# Patient Record
Sex: FEMALE | Race: WHITE
[De-identification: names, ages, dates, MRNs, and addresses within clinical notes are randomized per-mention and may not be internally consistent; named-entity substitution may affect disease eponyms.]

---

## 2018-06-13 ENCOUNTER — HOSPITAL ENCOUNTER (EMERGENCY)
Dept: HOSPITAL 8 - ED | Age: 23
Discharge: LEFT BEFORE DISCHARGE | End: 2018-06-13
Payer: MEDICAID

## 2018-06-13 VITALS — WEIGHT: 238.1 LBS | HEIGHT: 63 IN | BODY MASS INDEX: 42.19 KG/M2

## 2018-06-13 VITALS — DIASTOLIC BLOOD PRESSURE: 79 MMHG | SYSTOLIC BLOOD PRESSURE: 120 MMHG

## 2018-06-13 DIAGNOSIS — Z3A.01: ICD-10-CM

## 2018-06-13 DIAGNOSIS — R10.30: ICD-10-CM

## 2018-06-13 DIAGNOSIS — R42: ICD-10-CM

## 2018-06-13 DIAGNOSIS — O26.891: Primary | ICD-10-CM

## 2018-06-13 LAB
ALBUMIN SERPL-MCNC: 3 G/DL (ref 3.4–5)
ALP SERPL-CCNC: 70 U/L (ref 45–117)
ALT SERPL-CCNC: 31 U/L (ref 12–78)
ANION GAP SERPL CALC-SCNC: 8 MMOL/L (ref 5–15)
BASOPHILS # BLD AUTO: 0.06 X10^3/UL (ref 0–0.1)
BASOPHILS NFR BLD AUTO: 1 % (ref 0–1)
BILIRUB SERPL-MCNC: 0.2 MG/DL (ref 0.2–1)
CALCIUM SERPL-MCNC: 8.7 MG/DL (ref 8.5–10.1)
CHLORIDE SERPL-SCNC: 105 MMOL/L (ref 98–107)
CREAT SERPL-MCNC: 0.79 MG/DL (ref 0.55–1.02)
EOSINOPHIL # BLD AUTO: 0.13 X10^3/UL (ref 0–0.4)
EOSINOPHIL NFR BLD AUTO: 1 % (ref 1–7)
ERYTHROCYTE [DISTWIDTH] IN BLOOD BY AUTOMATED COUNT: 14.5 % (ref 9.6–15.2)
LYMPHOCYTES # BLD AUTO: 2.05 X10^3/UL (ref 1–3.4)
LYMPHOCYTES NFR BLD AUTO: 22 % (ref 22–44)
MCH RBC QN AUTO: 28.3 PG (ref 27–34.8)
MCHC RBC AUTO-ENTMCNC: 33.8 G/DL (ref 32.4–35.8)
MCV RBC AUTO: 83.9 FL (ref 80–100)
MD: NO
MONOCYTES # BLD AUTO: 0.59 X10^3/UL (ref 0.2–0.8)
MONOCYTES NFR BLD AUTO: 6 % (ref 2–9)
NEUTROPHILS # BLD AUTO: 6.47 X10^3/UL (ref 1.8–6.8)
NEUTROPHILS NFR BLD AUTO: 70 % (ref 42–75)
PLATELET # BLD AUTO: 310 X10^3/UL (ref 130–400)
PMV BLD AUTO: 8.1 FL (ref 7.4–10.4)
PROT SERPL-MCNC: 7.6 G/DL (ref 6.4–8.2)
RBC # BLD AUTO: 4.61 X10^6/UL (ref 3.82–5.3)

## 2018-06-13 PROCEDURE — 99285 EMERGENCY DEPT VISIT HI MDM: CPT

## 2018-06-13 PROCEDURE — 80053 COMPREHEN METABOLIC PANEL: CPT

## 2018-06-13 PROCEDURE — 76801 OB US < 14 WKS SINGLE FETUS: CPT

## 2018-06-13 PROCEDURE — 84702 CHORIONIC GONADOTROPIN TEST: CPT

## 2018-06-13 PROCEDURE — 36415 COLL VENOUS BLD VENIPUNCTURE: CPT

## 2018-06-13 PROCEDURE — 85025 COMPLETE CBC W/AUTO DIFF WBC: CPT

## 2018-09-20 ENCOUNTER — GYNECOLOGY VISIT (OUTPATIENT)
Dept: OBGYN | Facility: MEDICAL CENTER | Age: 23
End: 2018-09-20
Payer: COMMERCIAL

## 2018-09-20 ENCOUNTER — HOSPITAL ENCOUNTER (OUTPATIENT)
Facility: MEDICAL CENTER | Age: 23
End: 2018-09-20
Attending: OBSTETRICS & GYNECOLOGY
Payer: COMMERCIAL

## 2018-09-20 VITALS
DIASTOLIC BLOOD PRESSURE: 80 MMHG | SYSTOLIC BLOOD PRESSURE: 121 MMHG | HEIGHT: 63 IN | BODY MASS INDEX: 44.83 KG/M2 | WEIGHT: 253 LBS

## 2018-09-20 DIAGNOSIS — Z11.51 SCREENING FOR HPV (HUMAN PAPILLOMAVIRUS): ICD-10-CM

## 2018-09-20 DIAGNOSIS — Z01.419 WELL WOMAN EXAM WITH ROUTINE GYNECOLOGICAL EXAM: ICD-10-CM

## 2018-09-20 DIAGNOSIS — Z12.4 SCREENING FOR CERVICAL CANCER: ICD-10-CM

## 2018-09-20 DIAGNOSIS — Z34.90 PREGNANCY, UNSPECIFIED GESTATIONAL AGE: ICD-10-CM

## 2018-09-20 DIAGNOSIS — Z11.3 SCREEN FOR SEXUALLY TRANSMITTED DISEASES: ICD-10-CM

## 2018-09-20 PROCEDURE — 59401 PR NEW OB VISIT: CPT | Performed by: OBSTETRICS & GYNECOLOGY

## 2018-09-20 PROCEDURE — 88175 CYTOPATH C/V AUTO FLUID REDO: CPT

## 2018-09-20 PROCEDURE — 87591 N.GONORRHOEAE DNA AMP PROB: CPT

## 2018-09-20 PROCEDURE — 87491 CHLMYD TRACH DNA AMP PROBE: CPT

## 2018-09-20 RX ORDER — FLUTICASONE PROPIONATE 50 MCG
1 SPRAY, SUSPENSION (ML) NASAL DAILY
COMMUNITY

## 2018-09-21 ENCOUNTER — HOSPITAL ENCOUNTER (OUTPATIENT)
Facility: MEDICAL CENTER | Age: 23
End: 2018-09-21
Attending: OBSTETRICS & GYNECOLOGY
Payer: COMMERCIAL

## 2018-09-21 DIAGNOSIS — Z01.419 WELL WOMAN EXAM WITH ROUTINE GYNECOLOGICAL EXAM: ICD-10-CM

## 2018-09-21 DIAGNOSIS — Z11.51 SCREENING FOR HPV (HUMAN PAPILLOMAVIRUS): ICD-10-CM

## 2018-09-21 DIAGNOSIS — Z12.4 SCREENING FOR CERVICAL CANCER: ICD-10-CM

## 2018-09-21 DIAGNOSIS — Z11.3 SCREEN FOR SEXUALLY TRANSMITTED DISEASES: ICD-10-CM

## 2018-09-21 NOTE — PROGRESS NOTES
"Cc: New OB visit    HPI:  The patient is a 23 y.o.  EDC 12/15/18 based upon unsure LMP (first week April)    She presents for her new obstetric visit.    She reports fetal movement, denies vaginal bleeding, denies leakage of fluid, denies contractions.     She denies nausea/vomiting, headaches, or urinary symptoms.      OB History    Para Term  AB Living   4       2     SAB TAB Ectopic Molar Multiple Live Births   1                # Outcome Date GA Lbr Dwayne/2nd Weight Sex Delivery Anes PTL Lv   4             3             2 AB            1 SAB                 Past Medical History:   Diagnosis Date   • Asthma      History reviewed. No pertinent surgical history.  Social History     Social History   • Marital status: Single     Spouse name: N/A   • Number of children: N/A   • Years of education: N/A     Occupational History   • Not on file.     Social History Main Topics   • Smoking status: Never Smoker   • Smokeless tobacco: Never Used   • Alcohol use No   • Drug use: No   • Sexual activity: Yes     Partners: Male     Birth control/ protection: Pill     Other Topics Concern   • Not on file     Social History Narrative   • No narrative on file     Family History   Problem Relation Age of Onset   • Obesity Mother    • Cancer Maternal Aunt    • Hypertension Maternal Aunt    • Anemia Maternal Aunt    • Cancer Maternal Grandmother    • Obesity Maternal Grandmother      Allergies:   Allergies as of 2018   • (No Known Allergies)       PE:    Blood pressure 121/80, height 1.6 m (5' 3\"), weight 114.8 kg (253 lb).    General: no apparent distress, is well developed and well nourished  Head: normocephalic, atraumatic  Neck: neck is supple, non-tender, no thyromegaly, full range of motion  CVS: regular rate and rhythm, no peripheral edema  Lungs: Normal respiratory effort. Clear to auscultation bilaterally  Abdomen: Bowel sounds positive, nondistended, soft, nontender x4, no rebound or " julia.  Female GYN: normal female external genitalia without lesionsnormal external genitalia, no erythema, no discharge  Skin: No rashes, or ulcers or lesions seen  Psychiatric: Patient shows appropriate affect, is alert and oriented x3, intact judgment and insight.        A/P:  23 y.o.  Unknown based upon  No LMP recorded (within months)..  She is here for her new obstetric visit.    1. Well woman exam with routine gynecological exam  THINPREP RFLX HPV ASCUS W/CTNG   2. Screening for cervical cancer  THINPREP RFLX HPV ASCUS W/CTNG   3. Screening for HPV (human papillomavirus)  THINPREP RFLX HPV ASCUS W/CTNG   4. Screen for sexually transmitted diseases  THINPREP RFLX HPV ASCUS W/CTNG   5. Pregnancy, unspecified gestational age  GLUCOSE 1HR GESTATIONAL    PRENATAL PANEL 3+HIV+UACXI    US-OB 2ND 3RD TRI COMPLETE       1. Ultrasound for dates and anatomy ordered.  2. Too late for trimester screening for Down Syndrome and neural tube defects  3.  ordered prenatal labs.  4.  NOB packet given with ACOG prenatal book.  5.  Increase water intake and encouraged healthy nutrition.  6.  Referral to MFM not needed at this time.  7.  Continue prenatal vitamins.  8.  Follow-up in 4 weeks.   9.  SAB precautions educated.

## 2018-09-25 LAB
C TRACH DNA GENITAL QL NAA+PROBE: NEGATIVE
CYTOLOGY REG CYTOL: NORMAL
N GONORRHOEA DNA GENITAL QL NAA+PROBE: NEGATIVE
SPECIMEN SOURCE: NORMAL

## 2018-10-08 ENCOUNTER — HOSPITAL ENCOUNTER (OUTPATIENT)
Dept: RADIOLOGY | Facility: MEDICAL CENTER | Age: 23
End: 2018-10-08
Attending: OBSTETRICS & GYNECOLOGY
Payer: COMMERCIAL

## 2018-10-08 DIAGNOSIS — Z34.90 PREGNANCY, UNSPECIFIED GESTATIONAL AGE: ICD-10-CM

## 2018-10-08 PROCEDURE — 76805 OB US >/= 14 WKS SNGL FETUS: CPT

## 2018-10-20 ENCOUNTER — HOSPITAL ENCOUNTER (OUTPATIENT)
Dept: LAB | Facility: MEDICAL CENTER | Age: 23
End: 2018-10-20
Attending: OBSTETRICS & GYNECOLOGY
Payer: COMMERCIAL

## 2018-10-20 DIAGNOSIS — Z34.90 PREGNANCY, UNSPECIFIED GESTATIONAL AGE: ICD-10-CM

## 2018-10-20 LAB
ABO GROUP BLD: NORMAL
APPEARANCE UR: ABNORMAL
BACTERIA #/AREA URNS HPF: ABNORMAL /HPF
BASOPHILS # BLD AUTO: 0.4 % (ref 0–1.8)
BASOPHILS # BLD: 0.03 K/UL (ref 0–0.12)
BILIRUB UR QL STRIP.AUTO: NEGATIVE
BLD GP AB SCN SERPL QL: NORMAL
COLOR UR: ABNORMAL
EOSINOPHIL # BLD AUTO: 0.08 K/UL (ref 0–0.51)
EOSINOPHIL NFR BLD: 1 % (ref 0–6.9)
EPI CELLS #/AREA URNS HPF: ABNORMAL /HPF
ERYTHROCYTE [DISTWIDTH] IN BLOOD BY AUTOMATED COUNT: 47 FL (ref 35.9–50)
GLUCOSE 1H P 50 G GLC PO SERPL-MCNC: 118 MG/DL (ref 70–139)
GLUCOSE UR STRIP.AUTO-MCNC: NEGATIVE MG/DL
HBV SURFACE AG SER QL: NEGATIVE
HCT VFR BLD AUTO: 37.5 % (ref 37–47)
HGB BLD-MCNC: 12.3 G/DL (ref 12–16)
HIV 1+2 AB+HIV1 P24 AG SERPL QL IA: NON REACTIVE
IMM GRANULOCYTES # BLD AUTO: 0.05 K/UL (ref 0–0.11)
IMM GRANULOCYTES NFR BLD AUTO: 0.6 % (ref 0–0.9)
KETONES UR STRIP.AUTO-MCNC: ABNORMAL MG/DL
LEUKOCYTE ESTERASE UR QL STRIP.AUTO: ABNORMAL
LYMPHOCYTES # BLD AUTO: 1.59 K/UL (ref 1–4.8)
LYMPHOCYTES NFR BLD: 20.4 % (ref 22–41)
MCH RBC QN AUTO: 28.7 PG (ref 27–33)
MCHC RBC AUTO-ENTMCNC: 32.8 G/DL (ref 33.6–35)
MCV RBC AUTO: 87.6 FL (ref 81.4–97.8)
MICRO URNS: ABNORMAL
MONOCYTES # BLD AUTO: 0.44 K/UL (ref 0–0.85)
MONOCYTES NFR BLD AUTO: 5.7 % (ref 0–13.4)
NEUTROPHILS # BLD AUTO: 5.59 K/UL (ref 2–7.15)
NEUTROPHILS NFR BLD: 71.9 % (ref 44–72)
NITRITE UR QL STRIP.AUTO: NEGATIVE
NRBC # BLD AUTO: 0 K/UL
NRBC BLD-RTO: 0 /100 WBC
PH UR STRIP.AUTO: 6 [PH]
PLATELET # BLD AUTO: 259 K/UL (ref 164–446)
PMV BLD AUTO: 10.7 FL (ref 9–12.9)
PROT UR QL STRIP: 100 MG/DL
RBC # BLD AUTO: 4.28 M/UL (ref 4.2–5.4)
RBC # URNS HPF: ABNORMAL /HPF
RBC UR QL AUTO: NEGATIVE
RH BLD: NORMAL
RUBV AB SER QL: 44 IU/ML
SP GR UR STRIP.AUTO: 1.03
TREPONEMA PALLIDUM IGG+IGM AB [PRESENCE] IN SERUM OR PLASMA BY IMMUNOASSAY: NON REACTIVE
UROBILINOGEN UR STRIP.AUTO-MCNC: 1 MG/DL
WBC # BLD AUTO: 7.8 K/UL (ref 4.8–10.8)
WBC #/AREA URNS HPF: ABNORMAL /HPF

## 2018-10-20 PROCEDURE — 87389 HIV-1 AG W/HIV-1&-2 AB AG IA: CPT

## 2018-10-20 PROCEDURE — 86850 RBC ANTIBODY SCREEN: CPT

## 2018-10-20 PROCEDURE — 86762 RUBELLA ANTIBODY: CPT

## 2018-10-20 PROCEDURE — 82950 GLUCOSE TEST: CPT

## 2018-10-20 PROCEDURE — 87340 HEPATITIS B SURFACE AG IA: CPT

## 2018-10-20 PROCEDURE — 81001 URINALYSIS AUTO W/SCOPE: CPT

## 2018-10-20 PROCEDURE — 87086 URINE CULTURE/COLONY COUNT: CPT

## 2018-10-20 PROCEDURE — 86780 TREPONEMA PALLIDUM: CPT

## 2018-10-20 PROCEDURE — 86900 BLOOD TYPING SEROLOGIC ABO: CPT

## 2018-10-20 PROCEDURE — 85025 COMPLETE CBC W/AUTO DIFF WBC: CPT

## 2018-10-20 PROCEDURE — 36415 COLL VENOUS BLD VENIPUNCTURE: CPT

## 2018-10-20 PROCEDURE — 86901 BLOOD TYPING SEROLOGIC RH(D): CPT

## 2018-10-22 LAB
BACTERIA UR CULT: NORMAL
SIGNIFICANT IND 70042: NORMAL
SITE SITE: NORMAL
SOURCE SOURCE: NORMAL

## 2018-10-23 ENCOUNTER — INITIAL PRENATAL (OUTPATIENT)
Dept: OBGYN | Facility: MEDICAL CENTER | Age: 23
End: 2018-10-23
Payer: COMMERCIAL

## 2018-10-23 VITALS — SYSTOLIC BLOOD PRESSURE: 124 MMHG | WEIGHT: 271 LBS | DIASTOLIC BLOOD PRESSURE: 80 MMHG | BODY MASS INDEX: 48.01 KG/M2

## 2018-10-23 DIAGNOSIS — O09.30 LATE PRENATAL CARE: ICD-10-CM

## 2018-10-23 DIAGNOSIS — Z3A.32 32 WEEKS GESTATION OF PREGNANCY: ICD-10-CM

## 2018-10-23 PROCEDURE — 90040 PR PRENATAL FOLLOW UP: CPT | Performed by: OBSTETRICS & GYNECOLOGY

## 2018-10-23 NOTE — PROGRESS NOTES
C/c: new OB appointment    HPI:  The patient is a 23 y.o.  32w5d based upon LMP and third TM US.   She presents for her new obstetric visit.    She reports fetal movement, denies vaginal bleeding, denies leakage of fluid, denies contractions.     She denies nausea/vomiting, headaches, or urinary symptoms.      OB History    Para Term  AB Living   5       2     SAB TAB Ectopic Molar Multiple Live Births   1                # Outcome Date GA Lbr Dwayne/2nd Weight Sex Delivery Anes PTL Lv   5 Current            4             3             2 AB            1 SAB                 Past Medical History:   Diagnosis Date   • Asthma      No past surgical history on file.  Social History     Social History   • Marital status: Single     Spouse name: N/A   • Number of children: N/A   • Years of education: N/A     Occupational History   • Not on file.     Social History Main Topics   • Smoking status: Never Smoker   • Smokeless tobacco: Never Used   • Alcohol use No   • Drug use: No   • Sexual activity: Yes     Partners: Male     Birth control/ protection: Pill     Other Topics Concern   • Not on file     Social History Narrative   • No narrative on file     Family History   Problem Relation Age of Onset   • Obesity Mother    • Cancer Maternal Aunt    • Hypertension Maternal Aunt    • Anemia Maternal Aunt    • Cancer Maternal Grandmother    • Obesity Maternal Grandmother      Allergies:   Allergies as of 10/23/2018   • (No Known Allergies)       PE:    Blood pressure 124/80, weight 122.9 kg (271 lb).  General: no apparent distress, is well developed and well nourished  Head: normocephalic, atraumatic  Neck: neck is supple, non-tender, no thyromegaly, full range of motion  CVS: regular rate and rhythm, no peripheral edema  Lungs: Normal respiratory effort. Clear to auscultation bilaterally  Abdomen: Bowel sounds positive, nondistended, soft, nontender x4, no rebound or guarding.  Skin: No rashes, or  ulcers or lesions seen  Psychiatric: Patient shows appropriate affect, is alert and oriented x3, intact judgment and insight.      US  10/8/2018 3:29 PM    HISTORY/REASON FOR EXAM:  Routine screening for abnormality  TECHNIQUE/EXAM DESCRIPTION: OB complete ultrasound.  COMPARISON:  None  FINDINGS:  Fetal Lie:  Vertex  LMP:  3/10/2018  Clinical NINOSKA by LMP:  12/15/2018    Placenta (Location):  Anterior/maternal right  Placenta Previa: No  Placental Grade: II    Amniotic Fluid Volume:  JEFF = 12.3 cm    Fetal Heart Rate:  139 bpm    Cervical Length:  3.88 cm  transabdominal    No maternal adnexal mass is identified.    Umbilical Artery S/D Ratio(s):  Not applicable    Fetal Anatomy  (Seen or Not Seen)  Lateral Ventricles     Seen  Cisterna Magna        Limited  Cerebellum              Limited  CSP             Seen  Orbits             Seen  Face/Lips                Limited  Cord Insertion         Seen  Placental CI         Seen  4 Chamber Heart     NOT Seen  LVOT               Seen  RVOT              Seen  Stomach       Seen  Kidneys                   Seen  Urinary Bladder      Seen  Spine                       Limited  3 Vessel Cord          NOT Seen  Both Upper Extremities    Seen  Both Lower Extremities    Seen  Diaphragm             Seen  Movement       Seen  Gender:  Likely female    Fetal Biometry  BPD    7.68 cm, 30w 6d  HC    28.18 cm, 30w 6d  AC    25.46 cm, 29w 5d  Femur Length    6.00 cm, 31w 2d  Humerus Length    5.21 cm, 30w 3d  Cerebellum Diameter   2.86 cm    EGA by this US:  30w 4d  NINOSKA by this US: 2018  NINOSKA by 1st US:  Not applicable    Estimated Fetal Weight:  1560 grams   Impression       Single intrauterine pregnancy of an estimated gestational age of 30w 4d with an estimated date of delivery of 2018.    Limited visualization of the spine and heart due to fetal position. Otherwise, fetal survey is within normal limits.     A/P:  23 y.o.  32w5d based upon LMP and third TM US. She is  here for her new obstetric visit.    - will get tdap and flu shot next visit  - s/p anatomy scan which was normal and confirmed delivery date of 12/15/2018  - patient oriented to practice and call schedule routine  - L&D triage location discussed  - Cont PNV  - PTL precautions and FKC discussed

## 2018-10-23 NOTE — PROGRESS NOTES
Pt here today for OB follow up @ 32w5d  Pt denies leaking fluids, vaginal blood or contractions  Reports +FM  Pharmacy Confirmed.

## 2018-10-31 ENCOUNTER — ROUTINE PRENATAL (OUTPATIENT)
Dept: OBGYN | Facility: MEDICAL CENTER | Age: 23
End: 2018-10-31
Payer: COMMERCIAL

## 2018-10-31 VITALS — DIASTOLIC BLOOD PRESSURE: 74 MMHG | SYSTOLIC BLOOD PRESSURE: 124 MMHG | WEIGHT: 273 LBS | BODY MASS INDEX: 48.36 KG/M2

## 2018-10-31 DIAGNOSIS — Z3A.33 PREGNANCY WITH 33 COMPLETED WEEKS GESTATION: ICD-10-CM

## 2018-10-31 PROCEDURE — 90471 IMMUNIZATION ADMIN: CPT | Performed by: OBSTETRICS & GYNECOLOGY

## 2018-10-31 PROCEDURE — 90472 IMMUNIZATION ADMIN EACH ADD: CPT | Performed by: OBSTETRICS & GYNECOLOGY

## 2018-10-31 PROCEDURE — 90715 TDAP VACCINE 7 YRS/> IM: CPT | Performed by: OBSTETRICS & GYNECOLOGY

## 2018-10-31 PROCEDURE — 90040 PR PRENATAL FOLLOW UP: CPT | Performed by: OBSTETRICS & GYNECOLOGY

## 2018-10-31 PROCEDURE — 90686 IIV4 VACC NO PRSV 0.5 ML IM: CPT | Performed by: OBSTETRICS & GYNECOLOGY

## 2018-10-31 NOTE — PROGRESS NOTES
Pt here today for OB follow up @ 33w6d  Pt Denies Any leaking fluids, vaginal blood or contractions  Reports +FM  Good # [phone]  Pharmacy Confirmed.  T- dap and Flu vaccine given today

## 2018-10-31 NOTE — PROGRESS NOTES
S: Pt presents for routine OB follow up.  No contractions, vaginal bleeding, or leaking fluids. Good fetal movement.    Questions answered.    O: /74   Wt 123.8 kg (273 lb)   BMI 48.36 kg/m²   Patients' weight gain, fluid intake and exercise level discussed.  Vitals, fundal height , fetal position, and FHR reviewed on flowsheet    Lab:  Recent Results (from the past 336 hour(s))   GLUCOSE 1HR GESTATIONAL    Collection Time: 10/20/18  9:19 AM   Result Value Ref Range    Glucose, Post Dose 118 70 - 139 mg/dL   PRENATAL PANEL 3+HIV+UACXI    Collection Time: 10/20/18  9:19 AM   Result Value Ref Range    WBC 7.8 4.8 - 10.8 K/uL    RBC 4.28 4.20 - 5.40 M/uL    Hemoglobin 12.3 12.0 - 16.0 g/dL    Hematocrit 37.5 37.0 - 47.0 %    MCV 87.6 81.4 - 97.8 fL    MCH 28.7 27.0 - 33.0 pg    MCHC 32.8 (L) 33.6 - 35.0 g/dL    RDW 47.0 35.9 - 50.0 fL    Platelet Count 259 164 - 446 K/uL    MPV 10.7 9.0 - 12.9 fL    Neutrophils-Polys 71.90 44.00 - 72.00 %    Lymphocytes 20.40 (L) 22.00 - 41.00 %    Monocytes 5.70 0.00 - 13.40 %    Eosinophils 1.00 0.00 - 6.90 %    Basophils 0.40 0.00 - 1.80 %    Immature Granulocytes 0.60 0.00 - 0.90 %    Nucleated RBC 0.00 /100 WBC    Neutrophils (Absolute) 5.59 2.00 - 7.15 K/uL    Lymphs (Absolute) 1.59 1.00 - 4.80 K/uL    Monos (Absolute) 0.44 0.00 - 0.85 K/uL    Eos (Absolute) 0.08 0.00 - 0.51 K/uL    Baso (Absolute) 0.03 0.00 - 0.12 K/uL    Immature Granulocytes (abs) 0.05 0.00 - 0.11 K/uL    NRBC (Absolute) 0.00 K/uL    Color DK Yellow     Character Cloudy (A)     Specific Gravity 1.028 <1.035    Ph 6.0 5.0 - 8.0    Glucose Negative Negative mg/dL    Ketones Trace (A) Negative mg/dL    Protein 100 (A) Negative mg/dL    Bilirubin Negative Negative    Urobilinogen, Urine 1.0 Negative    Nitrite Negative Negative    Leukocyte Esterase Small (A) Negative    Occult Blood Negative Negative    Micro Urine Req Microscopic     Rubella IgG Antibody 44.00 IU/mL    Hepatitis B Surface Antigen  Negative Negative    Syphilis, Treponemal Qual Non Reactive Non Reactive   HIV AG/AB COMBO ASSAY SCREENING    Collection Time: 10/20/18  9:19 AM   Result Value Ref Range    HIV Ag/Ab Combo Assay Non Reactive Non Reactive   OP PRENATAL PANEL-BLOOD BANK    Collection Time: 10/20/18  9:19 AM   Result Value Ref Range    ABO Grouping Only A     Rh Grouping Only POS     Antibody Screen Scrn NEG    URINE MICROSCOPIC (W/UA)    Collection Time: 10/20/18  9:19 AM   Result Value Ref Range    WBC  (A) /hpf    RBC 0-2 /hpf    Bacteria Many (A) None /hpf    Epithelial Cells Few /hpf   URINE CULTURE(NEW)    Collection Time: 10/20/18  9:19 AM   Result Value Ref Range    Significant Indicator NEG     Source UR     Site      Urine Culture Mixed skin silvia ,000 cfu/mL        Prenatal labs:  T&S: A+  HIV: neg  GBS: next time  1 hour: 118   3 hour: na    Level II: NINOSKA of 2018 with EFW 1560grams (S=D)    A/P:  23 y.o.  at 33w6d based on late US. Patient presented in third trimester for care. Pt unsure of LMP (mid April) presents for routine obstetric follow-up.  Size equals dates and/or scan    1.  Continue prenatal vitamins.  2.  Begin kick counts at 28 weeks.  3.  Exercise at least 30 minutes daily.  4.  Increase water intake.  5.  Follow-up in 2 weeks for GBS  Tdap and Flu vaccine today  Rh positive

## 2018-11-07 ENCOUNTER — HOSPITAL ENCOUNTER (INPATIENT)
Facility: MEDICAL CENTER | Age: 23
LOS: 4 days | End: 2018-11-11
Attending: OBSTETRICS & GYNECOLOGY | Admitting: OBSTETRICS & GYNECOLOGY
Payer: COMMERCIAL

## 2018-11-07 ENCOUNTER — DOCUMENTATION (OUTPATIENT)
Dept: OBGYN | Facility: MEDICAL CENTER | Age: 23
End: 2018-11-07

## 2018-11-07 ENCOUNTER — HOSPITAL ENCOUNTER (EMERGENCY)
Facility: MEDICAL CENTER | Age: 23
End: 2018-11-07
Attending: EMERGENCY MEDICINE
Payer: COMMERCIAL

## 2018-11-07 VITALS
BODY MASS INDEX: 49.65 KG/M2 | WEIGHT: 280.2 LBS | HEIGHT: 63 IN | RESPIRATION RATE: 29 BRPM | OXYGEN SATURATION: 100 % | SYSTOLIC BLOOD PRESSURE: 209 MMHG | HEART RATE: 81 BPM | TEMPERATURE: 98.1 F | DIASTOLIC BLOOD PRESSURE: 144 MMHG

## 2018-11-07 DIAGNOSIS — O14.13 PREECLAMPSIA, SEVERE, THIRD TRIMESTER: ICD-10-CM

## 2018-11-07 DIAGNOSIS — O14.93 PREECLAMPSIA, THIRD TRIMESTER: ICD-10-CM

## 2018-11-07 DIAGNOSIS — H53.9 VISION CHANGES: ICD-10-CM

## 2018-11-07 LAB
ALBUMIN SERPL BCP-MCNC: 2.5 G/DL (ref 3.2–4.9)
ALBUMIN/GLOB SERPL: 0.8 G/DL
ALP SERPL-CCNC: 114 U/L (ref 30–99)
ALT SERPL-CCNC: 10 U/L (ref 2–50)
AMPHET UR QL SCN: NEGATIVE
ANION GAP SERPL CALC-SCNC: 7 MMOL/L (ref 0–11.9)
APPEARANCE UR: ABNORMAL
APTT PPP: 30.5 SEC (ref 24.7–36)
AST SERPL-CCNC: 21 U/L (ref 12–45)
BACTERIA #/AREA URNS HPF: ABNORMAL /HPF
BARBITURATES UR QL SCN: NEGATIVE
BASOPHILS # BLD AUTO: 0.3 % (ref 0–1.8)
BASOPHILS # BLD: 0.03 K/UL (ref 0–0.12)
BENZODIAZ UR QL SCN: NEGATIVE
BILIRUB SERPL-MCNC: 0.3 MG/DL (ref 0.1–1.5)
BILIRUB UR QL STRIP.AUTO: NEGATIVE
BUN SERPL-MCNC: 14 MG/DL (ref 8–22)
BZE UR QL SCN: NEGATIVE
CALCIUM SERPL-MCNC: 9.1 MG/DL (ref 8.5–10.5)
CANNABINOIDS UR QL SCN: NEGATIVE
CHLORIDE SERPL-SCNC: 105 MMOL/L (ref 96–112)
CO2 SERPL-SCNC: 22 MMOL/L (ref 20–33)
COLOR UR: YELLOW
CREAT SERPL-MCNC: 0.91 MG/DL (ref 0.5–1.4)
CREAT UR-MCNC: 228.7 MG/DL
EOSINOPHIL # BLD AUTO: 0.09 K/UL (ref 0–0.51)
EOSINOPHIL NFR BLD: 0.9 % (ref 0–6.9)
EPI CELLS #/AREA URNS HPF: ABNORMAL /HPF
ERYTHROCYTE [DISTWIDTH] IN BLOOD BY AUTOMATED COUNT: 45.1 FL (ref 35.9–50)
GLOBULIN SER CALC-MCNC: 3.2 G/DL (ref 1.9–3.5)
GLUCOSE SERPL-MCNC: 84 MG/DL (ref 65–99)
GLUCOSE UR STRIP.AUTO-MCNC: NEGATIVE MG/DL
GRAM STN SPEC: NORMAL
HCT VFR BLD AUTO: 43.3 % (ref 37–47)
HGB BLD-MCNC: 14.5 G/DL (ref 12–16)
HYALINE CASTS #/AREA URNS LPF: ABNORMAL /LPF
IMM GRANULOCYTES # BLD AUTO: 0.03 K/UL (ref 0–0.11)
IMM GRANULOCYTES NFR BLD AUTO: 0.3 % (ref 0–0.9)
INR PPP: 0.96 (ref 0.87–1.13)
KETONES UR STRIP.AUTO-MCNC: NEGATIVE MG/DL
LEUKOCYTE ESTERASE UR QL STRIP.AUTO: ABNORMAL
LYMPHOCYTES # BLD AUTO: 2.71 K/UL (ref 1–4.8)
LYMPHOCYTES NFR BLD: 27.7 % (ref 22–41)
MCH RBC QN AUTO: 28 PG (ref 27–33)
MCHC RBC AUTO-ENTMCNC: 33.5 G/DL (ref 33.6–35)
MCV RBC AUTO: 83.8 FL (ref 81.4–97.8)
METHADONE UR QL SCN: NEGATIVE
MICRO URNS: ABNORMAL
MONOCYTES # BLD AUTO: 0.47 K/UL (ref 0–0.85)
MONOCYTES NFR BLD AUTO: 4.8 % (ref 0–13.4)
NEUTROPHILS # BLD AUTO: 6.46 K/UL (ref 2–7.15)
NEUTROPHILS NFR BLD: 66 % (ref 44–72)
NITRITE UR QL STRIP.AUTO: NEGATIVE
NRBC # BLD AUTO: 0 K/UL
NRBC BLD-RTO: 0 /100 WBC
OPIATES UR QL SCN: NEGATIVE
OXYCODONE UR QL SCN: NEGATIVE
PCP UR QL SCN: NEGATIVE
PH UR STRIP.AUTO: 6 [PH]
PLATELET # BLD AUTO: 245 K/UL (ref 164–446)
PMV BLD AUTO: 10.4 FL (ref 9–12.9)
POTASSIUM SERPL-SCNC: 3.9 MMOL/L (ref 3.6–5.5)
PROPOXYPH UR QL SCN: NEGATIVE
PROT SERPL-MCNC: 5.7 G/DL (ref 6–8.2)
PROT UR QL STRIP: >=1000 MG/DL
PROT UR-MCNC: 2952.3 MG/DL (ref 0–15)
PROT/CREAT UR: ABNORMAL MG/G (ref 10–107)
PROTHROMBIN TIME: 12.9 SEC (ref 12–14.6)
RBC # BLD AUTO: 5.17 M/UL (ref 4.2–5.4)
RBC # URNS HPF: ABNORMAL /HPF
RBC UR QL AUTO: ABNORMAL
SIGNIFICANT IND 70042: NORMAL
SITE SITE: NORMAL
SODIUM SERPL-SCNC: 134 MMOL/L (ref 135–145)
SOURCE SOURCE: NORMAL
SP GR UR STRIP.AUTO: 1.03
URATE SERPL-MCNC: 7.1 MG/DL (ref 1.9–8.2)
UROBILINOGEN UR STRIP.AUTO-MCNC: 0.2 MG/DL
WBC # BLD AUTO: 9.8 K/UL (ref 4.8–10.8)
WBC #/AREA URNS HPF: ABNORMAL /HPF

## 2018-11-07 PROCEDURE — 87075 CULTR BACTERIA EXCEPT BLOOD: CPT

## 2018-11-07 PROCEDURE — 88307 TISSUE EXAM BY PATHOLOGIST: CPT

## 2018-11-07 PROCEDURE — 700102 HCHG RX REV CODE 250 W/ 637 OVERRIDE(OP): Performed by: ANESTHESIOLOGY

## 2018-11-07 PROCEDURE — 85025 COMPLETE CBC W/AUTO DIFF WBC: CPT

## 2018-11-07 PROCEDURE — 85610 PROTHROMBIN TIME: CPT

## 2018-11-07 PROCEDURE — 700101 HCHG RX REV CODE 250: Performed by: OBSTETRICS & GYNECOLOGY

## 2018-11-07 PROCEDURE — 305385 HCHG SURGICAL SERVICES 1/4 HOUR: Performed by: OBSTETRICS & GYNECOLOGY

## 2018-11-07 PROCEDURE — 84550 ASSAY OF BLOOD/URIC ACID: CPT

## 2018-11-07 PROCEDURE — 82570 ASSAY OF URINE CREATININE: CPT

## 2018-11-07 PROCEDURE — 84156 ASSAY OF PROTEIN URINE: CPT

## 2018-11-07 PROCEDURE — 700101 HCHG RX REV CODE 250

## 2018-11-07 PROCEDURE — 770002 HCHG ROOM/CARE - OB PRIVATE (112)

## 2018-11-07 PROCEDURE — 700111 HCHG RX REV CODE 636 W/ 250 OVERRIDE (IP)

## 2018-11-07 PROCEDURE — 87205 SMEAR GRAM STAIN: CPT

## 2018-11-07 PROCEDURE — 81001 URINALYSIS AUTO W/SCOPE: CPT

## 2018-11-07 PROCEDURE — 85730 THROMBOPLASTIN TIME PARTIAL: CPT

## 2018-11-07 PROCEDURE — 96375 TX/PRO/DX INJ NEW DRUG ADDON: CPT

## 2018-11-07 PROCEDURE — 700101 HCHG RX REV CODE 250: Performed by: EMERGENCY MEDICINE

## 2018-11-07 PROCEDURE — A9270 NON-COVERED ITEM OR SERVICE: HCPCS | Performed by: OBSTETRICS & GYNECOLOGY

## 2018-11-07 PROCEDURE — 700111 HCHG RX REV CODE 636 W/ 250 OVERRIDE (IP): Performed by: OBSTETRICS & GYNECOLOGY

## 2018-11-07 PROCEDURE — 700111 HCHG RX REV CODE 636 W/ 250 OVERRIDE (IP): Performed by: ANESTHESIOLOGY

## 2018-11-07 PROCEDURE — 700111 HCHG RX REV CODE 636 W/ 250 OVERRIDE (IP): Performed by: EMERGENCY MEDICINE

## 2018-11-07 PROCEDURE — 87070 CULTURE OTHR SPECIMN AEROBIC: CPT

## 2018-11-07 PROCEDURE — 80307 DRUG TEST PRSMV CHEM ANLYZR: CPT

## 2018-11-07 PROCEDURE — 87086 URINE CULTURE/COLONY COUNT: CPT

## 2018-11-07 PROCEDURE — 700105 HCHG RX REV CODE 258

## 2018-11-07 PROCEDURE — 96365 THER/PROPH/DIAG IV INF INIT: CPT

## 2018-11-07 PROCEDURE — 304966 HCHG RECOVERY SVSC TIME ADDL 1/2 HR: Performed by: OBSTETRICS & GYNECOLOGY

## 2018-11-07 PROCEDURE — 304964 HCHG RECOVERY ROOM TIME 1HR: Performed by: OBSTETRICS & GYNECOLOGY

## 2018-11-07 PROCEDURE — 80053 COMPREHEN METABOLIC PANEL: CPT

## 2018-11-07 PROCEDURE — 306828 HCHG ANES-TIME GENERAL: Performed by: OBSTETRICS & GYNECOLOGY

## 2018-11-07 PROCEDURE — 700102 HCHG RX REV CODE 250 W/ 637 OVERRIDE(OP): Performed by: OBSTETRICS & GYNECOLOGY

## 2018-11-07 PROCEDURE — 99285 EMERGENCY DEPT VISIT HI MDM: CPT

## 2018-11-07 RX ORDER — HYDRALAZINE HYDROCHLORIDE 20 MG/ML
10 INJECTION INTRAMUSCULAR; INTRAVENOUS ONCE
Status: COMPLETED | OUTPATIENT
Start: 2018-11-07 | End: 2018-11-07

## 2018-11-07 RX ORDER — MAGNESIUM SULFATE HEPTAHYDRATE 40 MG/ML
1 INJECTION, SOLUTION INTRAVENOUS CONTINUOUS
Status: DISCONTINUED | OUTPATIENT
Start: 2018-11-07 | End: 2018-11-08

## 2018-11-07 RX ORDER — DIPHENHYDRAMINE HCL 25 MG
25 TABLET ORAL EVERY 6 HOURS PRN
Status: DISCONTINUED | OUTPATIENT
Start: 2018-11-07 | End: 2018-11-11 | Stop reason: HOSPADM

## 2018-11-07 RX ORDER — LABETALOL HYDROCHLORIDE 5 MG/ML
20 INJECTION, SOLUTION INTRAVENOUS ONCE
Status: COMPLETED | OUTPATIENT
Start: 2018-11-07 | End: 2018-11-07

## 2018-11-07 RX ORDER — VITAMIN A ACETATE, BETA CAROTENE, ASCORBIC ACID, CHOLECALCIFEROL, .ALPHA.-TOCOPHEROL ACETATE, DL-, THIAMINE MONONITRATE, RIBOFLAVIN, NIACINAMIDE, PYRIDOXINE HYDROCHLORIDE, FOLIC ACID, CYANOCOBALAMIN, CALCIUM CARBONATE, FERROUS FUMARATE, ZINC OXIDE, CUPRIC OXIDE 3080; 12; 120; 400; 1; 1.84; 3; 20; 22; 920; 25; 200; 27; 10; 2 [IU]/1; UG/1; MG/1; [IU]/1; MG/1; MG/1; MG/1; MG/1; MG/1; [IU]/1; MG/1; MG/1; MG/1; MG/1; MG/1
1 TABLET, FILM COATED ORAL EVERY MORNING
Status: DISCONTINUED | OUTPATIENT
Start: 2018-11-08 | End: 2018-11-11 | Stop reason: HOSPADM

## 2018-11-07 RX ORDER — OXYCODONE HYDROCHLORIDE AND ACETAMINOPHEN 5; 325 MG/1; MG/1
1 TABLET ORAL EVERY 4 HOURS PRN
Status: DISCONTINUED | OUTPATIENT
Start: 2018-11-07 | End: 2018-11-11 | Stop reason: HOSPADM

## 2018-11-07 RX ORDER — HYDROMORPHONE HYDROCHLORIDE 1 MG/ML
0.1 INJECTION, SOLUTION INTRAMUSCULAR; INTRAVENOUS; SUBCUTANEOUS
Status: DISCONTINUED | OUTPATIENT
Start: 2018-11-07 | End: 2018-11-07 | Stop reason: HOSPADM

## 2018-11-07 RX ORDER — LABETALOL HYDROCHLORIDE 5 MG/ML
10 INJECTION, SOLUTION INTRAVENOUS ONCE
Status: COMPLETED | OUTPATIENT
Start: 2018-11-07 | End: 2018-11-07

## 2018-11-07 RX ORDER — ONDANSETRON 2 MG/ML
4 INJECTION INTRAMUSCULAR; INTRAVENOUS
Status: DISCONTINUED | OUTPATIENT
Start: 2018-11-07 | End: 2018-11-07 | Stop reason: HOSPADM

## 2018-11-07 RX ORDER — MAGNESIUM SULFATE HEPTAHYDRATE 40 MG/ML
4 INJECTION, SOLUTION INTRAVENOUS CONTINUOUS
Status: DISCONTINUED | OUTPATIENT
Start: 2018-11-07 | End: 2018-11-07 | Stop reason: HOSPADM

## 2018-11-07 RX ORDER — MAGNESIUM SULFATE HEPTAHYDRATE 40 MG/ML
4 INJECTION, SOLUTION INTRAVENOUS ONCE
Status: DISCONTINUED | OUTPATIENT
Start: 2018-11-07 | End: 2018-11-07 | Stop reason: HOSPADM

## 2018-11-07 RX ORDER — HYDRALAZINE HYDROCHLORIDE 20 MG/ML
5 INJECTION INTRAMUSCULAR; INTRAVENOUS
Status: DISCONTINUED | OUTPATIENT
Start: 2018-11-07 | End: 2018-11-07 | Stop reason: HOSPADM

## 2018-11-07 RX ORDER — MAGNESIUM SULFATE HEPTAHYDRATE 40 MG/ML
2 INJECTION, SOLUTION INTRAVENOUS CONTINUOUS
Status: DISCONTINUED | OUTPATIENT
Start: 2018-11-07 | End: 2018-11-08

## 2018-11-07 RX ORDER — KETOROLAC TROMETHAMINE 30 MG/ML
30 INJECTION, SOLUTION INTRAMUSCULAR; INTRAVENOUS EVERY 6 HOURS
Status: DISCONTINUED | OUTPATIENT
Start: 2018-11-07 | End: 2018-11-07

## 2018-11-07 RX ORDER — ONDANSETRON 4 MG/1
4 TABLET, ORALLY DISINTEGRATING ORAL EVERY 6 HOURS PRN
Status: DISCONTINUED | OUTPATIENT
Start: 2018-11-07 | End: 2018-11-11 | Stop reason: HOSPADM

## 2018-11-07 RX ORDER — HYDROMORPHONE HYDROCHLORIDE 1 MG/ML
0.4 INJECTION, SOLUTION INTRAMUSCULAR; INTRAVENOUS; SUBCUTANEOUS
Status: DISCONTINUED | OUTPATIENT
Start: 2018-11-07 | End: 2018-11-07 | Stop reason: HOSPADM

## 2018-11-07 RX ORDER — DIPHENHYDRAMINE HYDROCHLORIDE 50 MG/ML
12.5 INJECTION INTRAMUSCULAR; INTRAVENOUS
Status: DISCONTINUED | OUTPATIENT
Start: 2018-11-07 | End: 2018-11-07 | Stop reason: HOSPADM

## 2018-11-07 RX ORDER — OXYCODONE AND ACETAMINOPHEN 10; 325 MG/1; MG/1
1 TABLET ORAL EVERY 4 HOURS PRN
Status: DISCONTINUED | OUTPATIENT
Start: 2018-11-07 | End: 2018-11-11 | Stop reason: HOSPADM

## 2018-11-07 RX ORDER — ONDANSETRON 2 MG/ML
4 INJECTION INTRAMUSCULAR; INTRAVENOUS EVERY 6 HOURS PRN
Status: DISCONTINUED | OUTPATIENT
Start: 2018-11-07 | End: 2018-11-11 | Stop reason: HOSPADM

## 2018-11-07 RX ORDER — SODIUM CHLORIDE, SODIUM LACTATE, POTASSIUM CHLORIDE, CALCIUM CHLORIDE 600; 310; 30; 20 MG/100ML; MG/100ML; MG/100ML; MG/100ML
INJECTION, SOLUTION INTRAVENOUS CONTINUOUS
Status: DISCONTINUED | OUTPATIENT
Start: 2018-11-07 | End: 2018-11-11 | Stop reason: HOSPADM

## 2018-11-07 RX ORDER — SODIUM CHLORIDE, SODIUM LACTATE, POTASSIUM CHLORIDE, CALCIUM CHLORIDE 600; 310; 30; 20 MG/100ML; MG/100ML; MG/100ML; MG/100ML
1000 INJECTION, SOLUTION INTRAVENOUS CONTINUOUS
Status: DISCONTINUED | OUTPATIENT
Start: 2018-11-07 | End: 2018-11-11 | Stop reason: HOSPADM

## 2018-11-07 RX ORDER — MAGNESIUM SULFATE HEPTAHYDRATE 40 MG/ML
INJECTION, SOLUTION INTRAVENOUS
Status: COMPLETED
Start: 2018-11-07 | End: 2018-11-07

## 2018-11-07 RX ORDER — DOCUSATE SODIUM 100 MG/1
100 CAPSULE, LIQUID FILLED ORAL 2 TIMES DAILY PRN
Status: DISCONTINUED | OUTPATIENT
Start: 2018-11-07 | End: 2018-11-11 | Stop reason: HOSPADM

## 2018-11-07 RX ORDER — HYDROMORPHONE HYDROCHLORIDE 1 MG/ML
0.2 INJECTION, SOLUTION INTRAMUSCULAR; INTRAVENOUS; SUBCUTANEOUS
Status: DISCONTINUED | OUTPATIENT
Start: 2018-11-07 | End: 2018-11-07 | Stop reason: HOSPADM

## 2018-11-07 RX ORDER — SODIUM CHLORIDE, SODIUM LACTATE, POTASSIUM CHLORIDE, CALCIUM CHLORIDE 600; 310; 30; 20 MG/100ML; MG/100ML; MG/100ML; MG/100ML
INJECTION, SOLUTION INTRAVENOUS PRN
Status: DISCONTINUED | OUTPATIENT
Start: 2018-11-07 | End: 2018-11-11 | Stop reason: HOSPADM

## 2018-11-07 RX ORDER — LABETALOL HYDROCHLORIDE 5 MG/ML
5 INJECTION, SOLUTION INTRAVENOUS
Status: DISCONTINUED | OUTPATIENT
Start: 2018-11-07 | End: 2018-11-07 | Stop reason: HOSPADM

## 2018-11-07 RX ORDER — ACETAMINOPHEN 325 MG/1
325 TABLET ORAL EVERY 4 HOURS PRN
Status: DISCONTINUED | OUTPATIENT
Start: 2018-11-07 | End: 2018-11-11 | Stop reason: HOSPADM

## 2018-11-07 RX ORDER — HALOPERIDOL 5 MG/ML
1 INJECTION INTRAMUSCULAR
Status: DISCONTINUED | OUTPATIENT
Start: 2018-11-07 | End: 2018-11-07 | Stop reason: HOSPADM

## 2018-11-07 RX ORDER — LABETALOL 300 MG/1
300 TABLET, FILM COATED ORAL TWICE DAILY
Status: DISCONTINUED | OUTPATIENT
Start: 2018-11-08 | End: 2018-11-08

## 2018-11-07 RX ORDER — SIMETHICONE 80 MG
80 TABLET,CHEWABLE ORAL 4 TIMES DAILY PRN
Status: DISCONTINUED | OUTPATIENT
Start: 2018-11-07 | End: 2018-11-11 | Stop reason: HOSPADM

## 2018-11-07 RX ORDER — MISOPROSTOL 200 UG/1
800 TABLET ORAL
Status: DISCONTINUED | OUTPATIENT
Start: 2018-11-07 | End: 2018-11-11 | Stop reason: HOSPADM

## 2018-11-07 RX ORDER — IBUPROFEN 800 MG/1
800 TABLET ORAL EVERY 8 HOURS PRN
Status: DISCONTINUED | OUTPATIENT
Start: 2018-11-07 | End: 2018-11-11 | Stop reason: HOSPADM

## 2018-11-07 RX ORDER — MORPHINE SULFATE 4 MG/ML
2 INJECTION, SOLUTION INTRAMUSCULAR; INTRAVENOUS
Status: DISCONTINUED | OUTPATIENT
Start: 2018-11-07 | End: 2018-11-11 | Stop reason: HOSPADM

## 2018-11-07 RX ORDER — LABETALOL HYDROCHLORIDE 5 MG/ML
INJECTION, SOLUTION INTRAVENOUS
Status: COMPLETED
Start: 2018-11-07 | End: 2018-11-07

## 2018-11-07 RX ORDER — HYDRALAZINE HYDROCHLORIDE 20 MG/ML
INJECTION INTRAMUSCULAR; INTRAVENOUS
Status: COMPLETED
Start: 2018-11-07 | End: 2018-11-07

## 2018-11-07 RX ORDER — CALCIUM GLUCONATE 94 MG/ML
1 INJECTION, SOLUTION INTRAVENOUS
Status: DISCONTINUED | OUTPATIENT
Start: 2018-11-07 | End: 2018-11-11 | Stop reason: HOSPADM

## 2018-11-07 RX ORDER — CITRIC ACID/SODIUM CITRATE 334-500MG
30 SOLUTION, ORAL ORAL ONCE
Status: COMPLETED | OUTPATIENT
Start: 2018-11-07 | End: 2018-11-07

## 2018-11-07 RX ORDER — DIPHENHYDRAMINE HYDROCHLORIDE 50 MG/ML
25 INJECTION INTRAMUSCULAR; INTRAVENOUS EVERY 6 HOURS PRN
Status: DISCONTINUED | OUTPATIENT
Start: 2018-11-07 | End: 2018-11-11 | Stop reason: HOSPADM

## 2018-11-07 RX ORDER — HYDROMORPHONE HYDROCHLORIDE 1 MG/ML
1 INJECTION, SOLUTION INTRAMUSCULAR; INTRAVENOUS; SUBCUTANEOUS ONCE
Status: COMPLETED | OUTPATIENT
Start: 2018-11-07 | End: 2018-11-07

## 2018-11-07 RX ORDER — SODIUM CHLORIDE, SODIUM GLUCONATE, SODIUM ACETATE, POTASSIUM CHLORIDE AND MAGNESIUM CHLORIDE 526; 502; 368; 37; 30 MG/100ML; MG/100ML; MG/100ML; MG/100ML; MG/100ML
1500 INJECTION, SOLUTION INTRAVENOUS ONCE
Status: COMPLETED | OUTPATIENT
Start: 2018-11-07 | End: 2018-11-07

## 2018-11-07 RX ORDER — METOCLOPRAMIDE HYDROCHLORIDE 5 MG/ML
10 INJECTION INTRAMUSCULAR; INTRAVENOUS ONCE
Status: COMPLETED | OUTPATIENT
Start: 2018-11-07 | End: 2018-11-07

## 2018-11-07 RX ORDER — SODIUM CHLORIDE, SODIUM GLUCONATE, SODIUM ACETATE, POTASSIUM CHLORIDE AND MAGNESIUM CHLORIDE 526; 502; 368; 37; 30 MG/100ML; MG/100ML; MG/100ML; MG/100ML; MG/100ML
INJECTION, SOLUTION INTRAVENOUS
Status: COMPLETED
Start: 2018-11-07 | End: 2018-11-07

## 2018-11-07 RX ADMIN — MAGNESIUM SULFATE HEPTAHYDRATE 2 G: 40 INJECTION, SOLUTION INTRAVENOUS at 15:27

## 2018-11-07 RX ADMIN — OXYCODONE HYDROCHLORIDE AND ACETAMINOPHEN 1 TABLET: 10; 325 TABLET ORAL at 21:21

## 2018-11-07 RX ADMIN — MAGNESIUM SULFATE IN WATER 2 G/HR: 40 INJECTION, SOLUTION INTRAVENOUS at 15:38

## 2018-11-07 RX ADMIN — MAGNESIUM SULFATE IN WATER 4 G: 40 INJECTION, SOLUTION INTRAVENOUS at 14:59

## 2018-11-07 RX ADMIN — FAMOTIDINE 20 MG: 10 INJECTION INTRAVENOUS at 15:50

## 2018-11-07 RX ADMIN — SODIUM CHLORIDE, SODIUM GLUCONATE, SODIUM ACETATE, POTASSIUM CHLORIDE AND MAGNESIUM CHLORIDE: 526; 502; 368; 37; 30 INJECTION, SOLUTION INTRAVENOUS at 15:45

## 2018-11-07 RX ADMIN — LABETALOL HYDROCHLORIDE 20 MG: 5 INJECTION, SOLUTION INTRAVENOUS at 15:26

## 2018-11-07 RX ADMIN — LABETALOL HYDROCHLORIDE 20 MG: 5 INJECTION, SOLUTION INTRAVENOUS at 15:43

## 2018-11-07 RX ADMIN — HYDROMORPHONE HYDROCHLORIDE 1 MG: 1 INJECTION, SOLUTION INTRAMUSCULAR; INTRAVENOUS; SUBCUTANEOUS at 14:52

## 2018-11-07 RX ADMIN — HYDRALAZINE HYDROCHLORIDE 10 MG: 20 INJECTION INTRAMUSCULAR; INTRAVENOUS at 18:45

## 2018-11-07 RX ADMIN — SODIUM CITRATE AND CITRIC ACID MONOHYDRATE 30 ML: 500; 334 SOLUTION ORAL at 15:50

## 2018-11-07 RX ADMIN — METOCLOPRAMIDE 10 MG: 5 INJECTION, SOLUTION INTRAMUSCULAR; INTRAVENOUS at 15:50

## 2018-11-07 RX ADMIN — LABETALOL HYDROCHLORIDE 10 MG: 5 INJECTION, SOLUTION INTRAVENOUS at 14:52

## 2018-11-07 RX ADMIN — HYDRALAZINE HYDROCHLORIDE: 20 INJECTION INTRAMUSCULAR; INTRAVENOUS at 19:30

## 2018-11-07 RX ADMIN — MAGNESIUM SULFATE IN WATER 2 G: 40 INJECTION, SOLUTION INTRAVENOUS at 15:27

## 2018-11-07 ASSESSMENT — PAIN SCALES - GENERAL
PAINLEVEL_OUTOF10: 0
PAINLEVEL_OUTOF10: 0
PAINLEVEL: 0 - NO PAIN
PAINLEVEL_OUTOF10: 7
PAINLEVEL_OUTOF10: 2
PAINLEVEL_OUTOF10: 0
PAINLEVEL_OUTOF10: 2
PAINLEVEL_OUTOF10: 0
PAINLEVEL_OUTOF10: 6
PAINLEVEL_OUTOF10: 8
PAINLEVEL_OUTOF10: 0

## 2018-11-07 ASSESSMENT — COPD QUESTIONNAIRES
DURING THE PAST 4 WEEKS HOW MUCH DID YOU FEEL SHORT OF BREATH: NONE/LITTLE OF THE TIME
IN THE PAST 12 MONTHS DO YOU DO LESS THAN YOU USED TO BECAUSE OF YOUR BREATHING PROBLEMS: DISAGREE/UNSURE
COPD SCREENING SCORE: 0
HAVE YOU SMOKED AT LEAST 100 CIGARETTES IN YOUR ENTIRE LIFE: NO/DON'T KNOW
DO YOU EVER COUGH UP ANY MUCUS OR PHLEGM?: NO/ONLY WITH OCCASIONAL COLDS OR INFECTIONS

## 2018-11-07 ASSESSMENT — LIFESTYLE VARIABLES: ALCOHOL_USE: NO

## 2018-11-07 ASSESSMENT — PATIENT HEALTH QUESTIONNAIRE - PHQ9
2. FEELING DOWN, DEPRESSED, IRRITABLE, OR HOPELESS: NOT AT ALL
1. LITTLE INTEREST OR PLEASURE IN DOING THINGS: NOT AT ALL
1. LITTLE INTEREST OR PLEASURE IN DOING THINGS: NOT AT ALL
2. FEELING DOWN, DEPRESSED, IRRITABLE, OR HOPELESS: NOT AT ALL
SUM OF ALL RESPONSES TO PHQ9 QUESTIONS 1 AND 2: 0
SUM OF ALL RESPONSES TO PHQ9 QUESTIONS 1 AND 2: 0

## 2018-11-07 NOTE — H&P
History and Physical    Flori Car is a 23 y.o. female  at 34w6d who presents for evaluation of pain.  Seen in ER, elevated BP noted 220/140, +confusion and RUQ pain    Subjective:   CTXs: negative   Pain: positive  LOF: negative  Vaginal bleeding: negative   Fetal movement: positive    ROS: Pertinent positives documented in HPI and all other systems reviewed & are negative    OB History    Para Term  AB Living   5       2     SAB TAB Ectopic Molar Multiple Live Births   1                # Outcome Date GA Lbr Dwayne/2nd Weight Sex Delivery Anes PTL Lv   5 Current            4             3             2 AB            1 SAB                   PGYNHx: none    Past Medical History:   Diagnosis Date   • Asthma        History reviewed. No pertinent surgical history.      Current Facility-Administered Medications:   •  electrolyte-A (PLASMALYTE-A) infusion 1,500 mL, 1,500 mL, Intravenous, Once, Suzanne Dorsey M.D.  •  PLASMA-LYTE A IV SOLN, , , ,   •  magnesium sulfate 20 g/500mL infusion, 2 g/hr, Intravenous, Continuous, Carlos Fong M.D., Last Rate: 50 mL/hr at 18 1538, 2 g/hr at 18 1538    Allergies: Patient has no known allergies.    Social History     Social History   • Marital status: Single     Spouse name: N/A   • Number of children: N/A   • Years of education: N/A     Occupational History   • Not on file.     Social History Main Topics   • Smoking status: Never Smoker   • Smokeless tobacco: Never Used   • Alcohol use No   • Drug use: No   • Sexual activity: Yes     Partners: Male     Birth control/ protection: Pill     Other Topics Concern   • Not on file     Social History Narrative   • No narrative on file         FamHx: not relevant    Prenatal care with [facility] with following problems:  Patient Active Problem List    Diagnosis Date Noted   • Pregnancy with 33 completed weeks gestation 10/31/2018   • 32 weeks gestation of pregnancy 10/23/2018   •  Late prenatal care 10/23/2018         Objective:      Blood pressure (!) 189/127.    General:   Slightly confused but alert   Skin:   normal   HEENT:  EOMI   Lungs:   CTA bilateral   Heart:   S1, S2 normal, chest is clear without rales or wheezing, no pedal edema, S1, S2 normal, no murmur, regular rate and rhythm   Abdomen:   soft, gravid, NT   EFW:  2200g   Pelvis:  adequate with gynecoid pelvis   FHT:  130 BPM  Accels y  Decels n  Variability mod  Category 1   Uterine Size: S=D   Presentations: Cephalic   Cervix:     Dilation:     Effacement:     Station:      Consistency:     Position:      Lab Review  Lab:   Blood type: A     Recent Results (from the past 5880 hour(s))   THINPREP RFLX HPV ASCUS W/CTNG    Collection Time: 09/20/18  5:01 PM   Result Value Ref Range    Cytology Reg See Path Report     Source Cervical     C. trachomatis by PCR Negative Negative    N. gonorrhoeae by PCR Negative Negative   GLUCOSE 1HR GESTATIONAL    Collection Time: 10/20/18  9:19 AM   Result Value Ref Range    Glucose, Post Dose 118 70 - 139 mg/dL   PRENATAL PANEL 3+HIV+UACXI    Collection Time: 10/20/18  9:19 AM   Result Value Ref Range    WBC 7.8 4.8 - 10.8 K/uL    RBC 4.28 4.20 - 5.40 M/uL    Hemoglobin 12.3 12.0 - 16.0 g/dL    Hematocrit 37.5 37.0 - 47.0 %    MCV 87.6 81.4 - 97.8 fL    MCH 28.7 27.0 - 33.0 pg    MCHC 32.8 (L) 33.6 - 35.0 g/dL    RDW 47.0 35.9 - 50.0 fL    Platelet Count 259 164 - 446 K/uL    MPV 10.7 9.0 - 12.9 fL    Neutrophils-Polys 71.90 44.00 - 72.00 %    Lymphocytes 20.40 (L) 22.00 - 41.00 %    Monocytes 5.70 0.00 - 13.40 %    Eosinophils 1.00 0.00 - 6.90 %    Basophils 0.40 0.00 - 1.80 %    Immature Granulocytes 0.60 0.00 - 0.90 %    Nucleated RBC 0.00 /100 WBC    Neutrophils (Absolute) 5.59 2.00 - 7.15 K/uL    Lymphs (Absolute) 1.59 1.00 - 4.80 K/uL    Monos (Absolute) 0.44 0.00 - 0.85 K/uL    Eos (Absolute) 0.08 0.00 - 0.51 K/uL    Baso (Absolute) 0.03 0.00 - 0.12 K/uL    Immature Granulocytes (abs) 0.05  0.00 - 0.11 K/uL    NRBC (Absolute) 0.00 K/uL    Color DK Yellow     Character Cloudy (A)     Specific Gravity 1.028 <1.035    Ph 6.0 5.0 - 8.0    Glucose Negative Negative mg/dL    Ketones Trace (A) Negative mg/dL    Protein 100 (A) Negative mg/dL    Bilirubin Negative Negative    Urobilinogen, Urine 1.0 Negative    Nitrite Negative Negative    Leukocyte Esterase Small (A) Negative    Occult Blood Negative Negative    Micro Urine Req Microscopic     Rubella IgG Antibody 44.00 IU/mL    Hepatitis B Surface Antigen Negative Negative    Syphilis, Treponemal Qual Non Reactive Non Reactive   HIV AG/AB COMBO ASSAY SCREENING    Collection Time: 10/20/18  9:19 AM   Result Value Ref Range    HIV Ag/Ab Combo Assay Non Reactive Non Reactive   OP PRENATAL PANEL-BLOOD BANK    Collection Time: 10/20/18  9:19 AM   Result Value Ref Range    ABO Grouping Only A     Rh Grouping Only POS     Antibody Screen Scrn NEG    URINE MICROSCOPIC (W/UA)    Collection Time: 10/20/18  9:19 AM   Result Value Ref Range    WBC  (A) /hpf    RBC 0-2 /hpf    Bacteria Many (A) None /hpf    Epithelial Cells Few /hpf   URINE CULTURE(NEW)    Collection Time: 10/20/18  9:19 AM   Result Value Ref Range    Significant Indicator NEG     Source UR     Site      Urine Culture Mixed skin silvia ,000 cfu/mL    CBC WITH DIFFERENTIAL    Collection Time: 11/07/18  2:45 PM   Result Value Ref Range    WBC 9.8 4.8 - 10.8 K/uL    RBC 5.17 4.20 - 5.40 M/uL    Hemoglobin 14.5 12.0 - 16.0 g/dL    Hematocrit 43.3 37.0 - 47.0 %    MCV 83.8 81.4 - 97.8 fL    MCH 28.0 27.0 - 33.0 pg    MCHC 33.5 (L) 33.6 - 35.0 g/dL    RDW 45.1 35.9 - 50.0 fL    Platelet Count 245 164 - 446 K/uL    MPV 10.4 9.0 - 12.9 fL    Neutrophils-Polys 66.00 44.00 - 72.00 %    Lymphocytes 27.70 22.00 - 41.00 %    Monocytes 4.80 0.00 - 13.40 %    Eosinophils 0.90 0.00 - 6.90 %    Basophils 0.30 0.00 - 1.80 %    Immature Granulocytes 0.30 0.00 - 0.90 %    Nucleated RBC 0.00 /100 WBC     Neutrophils (Absolute) 6.46 2.00 - 7.15 K/uL    Lymphs (Absolute) 2.71 1.00 - 4.80 K/uL    Monos (Absolute) 0.47 0.00 - 0.85 K/uL    Eos (Absolute) 0.09 0.00 - 0.51 K/uL    Baso (Absolute) 0.03 0.00 - 0.12 K/uL    Immature Granulocytes (abs) 0.03 0.00 - 0.11 K/uL    NRBC (Absolute) 0.00 K/uL   COMP METABOLIC PANEL    Collection Time: 11/07/18  2:45 PM   Result Value Ref Range    Sodium 134 (L) 135 - 145 mmol/L    Potassium 3.9 3.6 - 5.5 mmol/L    Chloride 105 96 - 112 mmol/L    Co2 22 20 - 33 mmol/L    Anion Gap 7.0 0.0 - 11.9    Glucose 84 65 - 99 mg/dL    Bun 14 8 - 22 mg/dL    Creatinine 0.91 0.50 - 1.40 mg/dL    Calcium 9.1 8.5 - 10.5 mg/dL    AST(SGOT) 21 12 - 45 U/L    ALT(SGPT) 10 2 - 50 U/L    Alkaline Phosphatase 114 (H) 30 - 99 U/L    Total Bilirubin 0.3 0.1 - 1.5 mg/dL    Albumin 2.5 (L) 3.2 - 4.9 g/dL    Total Protein 5.7 (L) 6.0 - 8.2 g/dL    Globulin 3.2 1.9 - 3.5 g/dL    A-G Ratio 0.8 g/dL   PROTHROMBIN TIME    Collection Time: 11/07/18  2:45 PM   Result Value Ref Range    PT 12.9 12.0 - 14.6 sec    INR 0.96 0.87 - 1.13   APTT    Collection Time: 11/07/18  2:45 PM   Result Value Ref Range    APTT 30.5 24.7 - 36.0 sec   URINALYSIS,CULTURE IF INDICATED    Collection Time: 11/07/18  2:45 PM   Result Value Ref Range    Color Yellow     Character Cloudy (A)     Specific Gravity 1.035 <1.035    Ph 6.0 5.0 - 8.0    Glucose Negative Negative mg/dL    Ketones Negative Negative mg/dL    Protein >=1000 (A) Negative mg/dL    Bilirubin Negative Negative    Urobilinogen, Urine 0.2 Negative    Nitrite Negative Negative    Leukocyte Esterase Small (A) Negative    Occult Blood Small (A) Negative    Micro Urine Req Microscopic    ESTIMATED GFR    Collection Time: 11/07/18  2:45 PM   Result Value Ref Range    GFR If African American >60 >60 mL/min/1.73 m 2    GFR If Non African American >60 >60 mL/min/1.73 m 2        Assessment:   Flori Car at 34w6d  Labor status: Not in labor.  Severe  pre-eclampsia  Obstetrical history significant for   Patient Active Problem List    Diagnosis Date Noted   • Pregnancy with 33 completed weeks gestation 10/31/2018   • 32 weeks gestation of pregnancy 10/23/2018   • Late prenatal care 10/23/2018   .      Plan:     Admit to L&D  Severe pre-eclampsia  Hyperreflexia  Control BP  Mag sulfate 6g load, then 2 g/hr  GBS unknown  Fetal monitoring/toco    Given elevated BP, altered mental status. Plan for PLTCS due to severity of situation    Discussed with the patient the risks of  delivery. The risks include pain, infection, bleeding, scarring, damage to other organs in the area of operation. Specifically organs that can be damaged are bowel, bladder, ureters. I also discussed with the patient the risk of wound infection and wound breakdown. We discussed that these risks are greater in people that have diabetes or obesity. I also discussed the risk of emergency blood transfusion during procedure as well as emergency hysterectomy during procedure.    Patient had the opportunity to ask questions regarding procedures. All questions answered to the patient's satisfaction.

## 2018-11-07 NOTE — ED NOTES
Med rec updated and complete.  Allergies reviewed.  Pt denies antibiotic use in last 30 days at home.

## 2018-11-07 NOTE — ED PROVIDER NOTES
"ED Provider Note    Scribed for Alonso San M.D. by Sandra Campos. 2018  2:26 PM    Primary care provider: Sena Alonso M.D.  Means of arrival: Walk-in  History obtained from: Patient   History limited by: none    CHIEF COMPLAINT  Chief Complaint   Patient presents with   • Headache   • Blurred Vision   • Hypertension       HPI  Flori Car is a 23 y.o. female who is 34 weeks pregnant who presents to the Emergency Department for a headache that began at 11:30AM. The patient ate lunch and all of a sudden she began seeing a \"rainbow [place]\" in her vision located on the right side of her vision. Her vision then got wobblely and she began getting a headache. Patient currently has a headache and has the vision changes associated at this time.  She had some numbness in her hands when the headache first onset which has since resolved. She denies any extremity weakness or dysuria associated. She denies modifying factors of her symptoms.  [facility] She does not remember her last blood pressure reading and denies history of hypertension. Patient is A0.     REVIEW OF SYSTEMS  Pertinent positives include headache, vision changes, sensory changes. Pertinent negatives include no extremity weakness or dysuria.  All other systems reviewed and negative.  C.    PAST MEDICAL HISTORY   has a past medical history of Asthma.    SURGICAL HISTORY  patient denies any surgical history    SOCIAL HISTORY  Social History   Substance Use Topics   • Smoking status: Never Smoker   • Smokeless tobacco: Never Used   • Alcohol use No      History   Drug Use No       FAMILY HISTORY  Family History   Problem Relation Age of Onset   • Obesity Mother    • Cancer Maternal Aunt    • Hypertension Maternal Aunt    • Anemia Maternal Aunt    • Cancer Maternal Grandmother    • Obesity Maternal Grandmother        CURRENT MEDICATIONS  Home Medications     Reviewed by Cynthia Yanez PhT " "(Pharmacy Tech) on 11/07/18 at 1504  Med List Status: Complete   Medication Last Dose Status   fluticasone (FLONASE) 50 MCG/ACT nasal spray 11/7/2018 Active   Prenatal Vit-Fe Fumarate-FA (PRENATAL 1+1 PO) 11/7/2018 Active                ALLERGIES  No Known Allergies    PHYSICAL EXAM  VITAL SIGNS: BP (!) 209/144   Pulse 85   Temp 36.7 °C (98.1 °F)   Resp 20   Ht 1.6 m (5' 3\")   Wt (!) 127.1 kg (280 lb 3.3 oz)   SpO2 98%   BMI 49.64 kg/m²     Constitutional: Well developed, Well nourished, mild to moderate distress, Non-toxic appearance.   HENT: Normocephalic, Atraumatic, Bilateral external ears normal, Oropharynx moist, No oral exudates.   Eyes: PERRLA, EOMI, Conjunctiva normal, No discharge.   Neck: No tenderness, Supple, No stridor.   Lymphatic: No lymphadenopathy noted.   Cardiovascular: Normal heart rate, Normal rhythm.   Thorax & Lungs: Clear to auscultation bilaterally, No respiratory distress, No wheezing, No crackles.   Abdomen: Soft, No tenderness, No masses, No pulsatile masses.   Skin: Warm, Dry, No erythema, No rash.   Extremities:1-2+ pitting edema,   No cyanosis.   Musculoskeletal: No tenderness to palpation or major deformities noted.  Intact distal pulses  Neurologic: Awake, alert.  right lateral quadrant visual field deficit, hyperreflexia. Cranial nerves 2-11 intact, muscle strength 5/5 in bilateral upper and lower extremities  Psychiatric: Affect normal, Judgment normal, Mood normal.       LABS  Labs Reviewed   CBC WITH DIFFERENTIAL - Abnormal; Notable for the following:        Result Value    MCHC 33.5 (*)     All other components within normal limits    Narrative:     Indicate which anticoagulants the patient is on:->NONE   COMP METABOLIC PANEL - Abnormal; Notable for the following:     Sodium 134 (*)     Alkaline Phosphatase 114 (*)     Albumin 2.5 (*)     Total Protein 5.7 (*)     All other components within normal limits    Narrative:     Indicate which anticoagulants the patient is " on:->NONE   URINALYSIS,CULTURE IF INDICATED - Abnormal; Notable for the following:     Character Cloudy (*)     Protein >=1000 (*)     Leukocyte Esterase Small (*)     Occult Blood Small (*)     All other components within normal limits   URINE MICROSCOPIC (W/UA) - Abnormal; Notable for the following:     WBC  (*)     RBC 2-5 (*)     Bacteria Many (*)     Hyaline Cast 6-10 (*)     All other components within normal limits   PROTEIN/CREAT RATIO URINE - Abnormal; Notable for the following:     Total Protein, Urine 2952.3 (*)     Protein Creatinine Ratio 32266 (*)     All other components within normal limits   PROTHROMBIN TIME    Narrative:     Indicate which anticoagulants the patient is on:->NONE   APTT    Narrative:     Indicate which anticoagulants the patient is on:->NONE   URINE DRUG SCREEN   ESTIMATED GFR    Narrative:     Indicate which anticoagulants the patient is on:->NONE   URINE CULTURE(NEW)   URIC ACID    Narrative:     Indicate which anticoagulants the patient is on:->NONE     All labs reviewed by me.      COURSE & MEDICAL DECISION MAKING  Pertinent Labs & Imaging studies reviewed. (See chart for details)    I reviewed the patient's medical records which showed previous IUP    2:26 PM - Patient seen and examined at bedside. I discussed that the patient's blood pressure is very high in the ED and I am very concerned for preeclampsia. Patient will be treated with 20g/500ml, 4g magnesium sulfate. Ordered Urinalysis, CBC with differential, CMP, Prothrombin Time, APTT to evaluate her symptoms. The differential diagnoses include but are not limited to: preeclampsia, vs severe eclampsia vs acute stroke.     2:46 PM - I discussed the patient's case and the above findings with Dr. Gonzalez (OB/GYN) advises I send patient to L&D immediately for delivery.    2:49 PM - I discussed the patient's case and the above findings with pharmacy who advises I treat with 1mg dilaudid, 10mg labetalol, 20g/500ml magnesium  sulfate infusion, and 4g magnesium sulfate IVPB.      2:49 PM Paged L&D.     CRITICAL CARE  The very real possibility of a deterioration of this patient's condition required the highest level of my preparedness for sudden, emergent intervention.  I provided critical care services, which included medication orders, frequent reevaluations of the patient's condition and response to treatment, ordering and reviewing test results, and discussing the case with various consultants.  The critical care time associated with the care of the patient was 35 minutes. Review chart for interventions. This time is exclusive of any other billable procedures.       Decision Making:  Patient is critically ill with acute onset of preeclampsia, headache, vision changes, elevated blood pressure, increased DTRs and pretibial edema.  We gave the patient a bolus dose of magnesium, labetalol, pain medicines, discussed the case with OB/GYN, the patient will be transferred up to labor and delivery and likely eminent delivery.    DISPOSITION:  Patient will be discharged and transported to L&D in critical condition.     FOLLOW UP:  [facility], Emergency Dept  [address] [place] [id number] [phone]    If symptoms worsen      OUTPATIENT MEDICATIONS:  Discharge Medication List as of 11/7/2018  3:16 PM            FINAL IMPRESSION  1. Preeclampsia, third trimester    2. Vision changes          ISandra (Scribe), am scribing for, and in the presence of, Alonso San M.D..    Electronically signed by: Sandra Campos (Scribe), 11/7/2018    IAlonso M.D. personally performed the services described in this documentation, as scribed by Sandra Campos in my presence, and it is both accurate and complete.    The note accurately reflects work and decisions made by me.  Alonso San  11/7/2018  6:04 PM

## 2018-11-07 NOTE — PROGRESS NOTES
Pt came to  in the office reporting severe migraine, advised to go to ED for evaluation of HA.    Shelley Mitchell MD  [facility], Women's Health

## 2018-11-07 NOTE — ED TRIAGE NOTES
Chief Complaint   Patient presents with   • Headache   • Blurred Vision   • Hypertension     Patient ambulatory to triage. Patient states that she was at work and starting expeirencing right sided arm numbness and blurred vision on the right side around 11:30. Patient negative for facial droop, equal bilateral , no slurred speech. Patient is 34 weeks pregant and denies any abdominal pain.  Charge updated on patient. Will be roomed shortly.     Placed out in lobby and educated on triage.

## 2018-11-08 LAB
ALBUMIN SERPL BCP-MCNC: 2.6 G/DL (ref 3.2–4.9)
ALBUMIN/GLOB SERPL: 0.8 G/DL
ALP SERPL-CCNC: 115 U/L (ref 30–99)
ALT SERPL-CCNC: 11 U/L (ref 2–50)
ANION GAP SERPL CALC-SCNC: 10 MMOL/L (ref 0–11.9)
AST SERPL-CCNC: 22 U/L (ref 12–45)
BILIRUB SERPL-MCNC: 0.3 MG/DL (ref 0.1–1.5)
BUN SERPL-MCNC: 14 MG/DL (ref 8–22)
CALCIUM SERPL-MCNC: 8.9 MG/DL (ref 8.5–10.5)
CHLORIDE SERPL-SCNC: 101 MMOL/L (ref 96–112)
CO2 SERPL-SCNC: 20 MMOL/L (ref 20–33)
CREAT SERPL-MCNC: 0.9 MG/DL (ref 0.5–1.4)
ERYTHROCYTE [DISTWIDTH] IN BLOOD BY AUTOMATED COUNT: 47.7 FL (ref 35.9–50)
GLOBULIN SER CALC-MCNC: 3.3 G/DL (ref 1.9–3.5)
GLUCOSE SERPL-MCNC: 133 MG/DL (ref 65–99)
HCT VFR BLD AUTO: 40 % (ref 37–47)
HGB BLD-MCNC: 13.1 G/DL (ref 12–16)
MAGNESIUM SERPL-MCNC: 5.7 MG/DL (ref 1.5–2.5)
MAGNESIUM SERPL-MCNC: 5.9 MG/DL (ref 1.5–2.5)
MAGNESIUM SERPL-MCNC: 6.2 MG/DL (ref 1.5–2.5)
MCH RBC QN AUTO: 28.1 PG (ref 27–33)
MCHC RBC AUTO-ENTMCNC: 32.8 G/DL (ref 33.6–35)
MCV RBC AUTO: 85.7 FL (ref 81.4–97.8)
PATHOLOGY CONSULT NOTE: NORMAL
PLATELET # BLD AUTO: 249 K/UL (ref 164–446)
PMV BLD AUTO: 10.6 FL (ref 9–12.9)
POTASSIUM SERPL-SCNC: 4.2 MMOL/L (ref 3.6–5.5)
PROT SERPL-MCNC: 5.9 G/DL (ref 6–8.2)
RBC # BLD AUTO: 4.67 M/UL (ref 4.2–5.4)
SODIUM SERPL-SCNC: 131 MMOL/L (ref 135–145)
WBC # BLD AUTO: 13.7 K/UL (ref 4.8–10.8)

## 2018-11-08 PROCEDURE — A9270 NON-COVERED ITEM OR SERVICE: HCPCS | Performed by: OBSTETRICS & GYNECOLOGY

## 2018-11-08 PROCEDURE — 80053 COMPREHEN METABOLIC PANEL: CPT

## 2018-11-08 PROCEDURE — 700111 HCHG RX REV CODE 636 W/ 250 OVERRIDE (IP): Performed by: OBSTETRICS & GYNECOLOGY

## 2018-11-08 PROCEDURE — 700102 HCHG RX REV CODE 250 W/ 637 OVERRIDE(OP): Performed by: OBSTETRICS & GYNECOLOGY

## 2018-11-08 PROCEDURE — 36415 COLL VENOUS BLD VENIPUNCTURE: CPT

## 2018-11-08 PROCEDURE — 85027 COMPLETE CBC AUTOMATED: CPT

## 2018-11-08 PROCEDURE — 700101 HCHG RX REV CODE 250

## 2018-11-08 PROCEDURE — 700105 HCHG RX REV CODE 258: Performed by: OBSTETRICS & GYNECOLOGY

## 2018-11-08 PROCEDURE — 83735 ASSAY OF MAGNESIUM: CPT | Mod: 91

## 2018-11-08 PROCEDURE — 770002 HCHG ROOM/CARE - OB PRIVATE (112)

## 2018-11-08 RX ORDER — HYDRALAZINE HYDROCHLORIDE 20 MG/ML
5-10 INJECTION INTRAMUSCULAR; INTRAVENOUS PRN
Status: DISCONTINUED | OUTPATIENT
Start: 2018-11-08 | End: 2018-11-11 | Stop reason: HOSPADM

## 2018-11-08 RX ORDER — NIFEDIPINE 10 MG/1
20 CAPSULE ORAL ONCE
Status: COMPLETED | OUTPATIENT
Start: 2018-11-08 | End: 2018-11-08

## 2018-11-08 RX ORDER — LABETALOL 100 MG/1
400 TABLET, FILM COATED ORAL EVERY 8 HOURS
Status: DISCONTINUED | OUTPATIENT
Start: 2018-11-09 | End: 2018-11-11 | Stop reason: HOSPADM

## 2018-11-08 RX ORDER — LABETALOL HYDROCHLORIDE 5 MG/ML
20-80 INJECTION, SOLUTION INTRAVENOUS PRN
Status: DISCONTINUED | OUTPATIENT
Start: 2018-11-08 | End: 2018-11-08

## 2018-11-08 RX ORDER — LABETALOL HYDROCHLORIDE 5 MG/ML
INJECTION, SOLUTION INTRAVENOUS
Status: COMPLETED
Start: 2018-11-08 | End: 2018-11-08

## 2018-11-08 RX ADMIN — SODIUM CHLORIDE, POTASSIUM CHLORIDE, SODIUM LACTATE AND CALCIUM CHLORIDE: 600; 310; 30; 20 INJECTION, SOLUTION INTRAVENOUS at 09:36

## 2018-11-08 RX ADMIN — NIFEDIPINE 20 MG: 10 CAPSULE, LIQUID FILLED ORAL at 19:09

## 2018-11-08 RX ADMIN — OXYCODONE HYDROCHLORIDE AND ACETAMINOPHEN 1 TABLET: 10; 325 TABLET ORAL at 13:05

## 2018-11-08 RX ADMIN — Medication 1 TABLET: at 17:58

## 2018-11-08 RX ADMIN — SODIUM CHLORIDE, POTASSIUM CHLORIDE, SODIUM LACTATE AND CALCIUM CHLORIDE: 600; 310; 30; 20 INJECTION, SOLUTION INTRAVENOUS at 00:17

## 2018-11-08 RX ADMIN — OXYCODONE HYDROCHLORIDE AND ACETAMINOPHEN 1 TABLET: 10; 325 TABLET ORAL at 07:07

## 2018-11-08 RX ADMIN — LABETALOL HYDROCHLORIDE 20 MG: 5 INJECTION, SOLUTION INTRAVENOUS at 18:30

## 2018-11-08 RX ADMIN — LABETALOL HYDROCHLORIDE 20 MG: 5 INJECTION, SOLUTION INTRAVENOUS at 04:22

## 2018-11-08 RX ADMIN — OXYCODONE HYDROCHLORIDE AND ACETAMINOPHEN 1 TABLET: 10; 325 TABLET ORAL at 17:57

## 2018-11-08 RX ADMIN — LABETALOL HYDROCHLORIDE 20 MG: 5 INJECTION, SOLUTION INTRAVENOUS at 18:48

## 2018-11-08 RX ADMIN — IBUPROFEN 800 MG: 800 TABLET, FILM COATED ORAL at 09:44

## 2018-11-08 RX ADMIN — LABETALOL HCL 300 MG: 300 TABLET, FILM COATED ORAL at 05:52

## 2018-11-08 RX ADMIN — MAGNESIUM SULFATE IN WATER 1 G/HR: 40 INJECTION, SOLUTION INTRAVENOUS at 03:55

## 2018-11-08 RX ADMIN — LABETALOL HCL 300 MG: 300 TABLET, FILM COATED ORAL at 17:57

## 2018-11-08 RX ADMIN — IBUPROFEN 800 MG: 800 TABLET, FILM COATED ORAL at 17:57

## 2018-11-08 RX ADMIN — OXYCODONE HYDROCHLORIDE AND ACETAMINOPHEN 1 TABLET: 10; 325 TABLET ORAL at 01:21

## 2018-11-08 ASSESSMENT — PATIENT HEALTH QUESTIONNAIRE - PHQ9
SUM OF ALL RESPONSES TO PHQ9 QUESTIONS 1 AND 2: 0
SUM OF ALL RESPONSES TO PHQ9 QUESTIONS 1 AND 2: 0
2. FEELING DOWN, DEPRESSED, IRRITABLE, OR HOPELESS: NOT AT ALL
2. FEELING DOWN, DEPRESSED, IRRITABLE, OR HOPELESS: NOT AT ALL
1. LITTLE INTEREST OR PLEASURE IN DOING THINGS: NOT AT ALL
1. LITTLE INTEREST OR PLEASURE IN DOING THINGS: NOT AT ALL

## 2018-11-08 ASSESSMENT — PAIN SCALES - GENERAL
PAINLEVEL_OUTOF10: 2
PAINLEVEL_OUTOF10: 7
PAINLEVEL_OUTOF10: 2
PAINLEVEL_OUTOF10: 3
PAINLEVEL_OUTOF10: 0

## 2018-11-08 NOTE — PROGRESS NOTES
1515: ER RN brings patient to S215 at this time via [email or web address]. Patient oriented to room and placed on monitors. Patient states blurry vision and headache at this time. Patient states she knows she is at [facility] but does not know what month it is or the date today. Patient states she has felt confused for the last hour or two.  1520: MD at bedside. MD performs US at this time. Infant is vertex at this time. MD educates patient on C/S due to high BP's. Patient prepared for C/S at this time. All questions answered.  1550: Dr. Dorsey at bedside to place epidural at this time.   1604: Patient in OR2 at this time.   1719: Patient transferred to PACU bed 3 at this time.  1800: Lab on phone at this time. Lab updated that placenta was sent to them via [employer]. Lab states understanding at this time.   1840: MD updated on patient BP at this time. New orders placed per MD.  1855: MD at bedside to talk with patient. MD states he would like to keep patient until AM to monitor patient closely. RN states understanding.  1910: Patient transferred to S234 at this time.   1920: FOB and family at bedside.   2115: Dr. Dorsey at bedside to talk with patient. No new orders placed.   2150: Report given to malik RN. Plan of care discussed. Patient resting in bed with family at bedside.

## 2018-11-08 NOTE — PROGRESS NOTES
2200: Report received from Miladys Mcdaniel RN. POC discussed care assumed. Pt states she is comfortable at this time.   0030: pt pumped at this time ( no milk/colostrum collected)   0050: lab called, critical mag of 6.2  0112: Dr. Armendariz called regarding Mag level. Orders to D/C mag x's 1 hour and restart mag at 1 g/hr.   0114: stopped mag  0215: Mag restarted at 1 g/hr ( see MAR)  0328: Pt BP elevated ( see flowsheet)  0330: pt pumped at this time ( no milk/ colostrum collected)  0355: Dr. Armendariz called, status update regarding BP's and urine output. Orders to start hypertensive protocol if needed.  0422: /102, hypertensive protocol started. IV labetalol given (see MAR)  0435: BP came down to 142/86 ( see flowsheet)  0633: Otilia Herbert midwife at nursing station asking for update,update given to Otilia Herbert regarding pt status.   0640: pt pumping at this time. (No milk/colostrum collected)  0700: report given to Leni SUAREZ

## 2018-11-08 NOTE — PROGRESS NOTES
Post Partum Progress Note    Name:   Flori Car   Date/Time:  2018 - 6:37 AM  Chief Admitting Dx:  Pregnancy  Labor and delivery, indication for care, severe preeclampsia  Delivery Type:   for severe preeclampsia  Post-Op/Post Partum Days #:  1    Subjective:  Abdominal pain: no  Ambulating:   no  Tolerating liquids:  yes  Tolerating food:  yes clear then as tolerated  Flatus:   no  BM:    no  Bleeding:   without any bleeding  Voiding:   Mcg[place] to gravity  Dizziness:   no  Feeding:   bottle - pumping     Vitals:    18 0531 18 0536 18 0538 18 0541   BP:   153/94    Pulse: 83 79 85 82   Resp:       Temp:       TempSrc:       SpO2: 96% 96%  98%   Weight:       Height:           Exam:  Breast: Tenderness no  Abdomen: Abdomen soft, non-tender. BS normal. No masses,  No organomegaly  Fundal Tenderness:  yes  Fundus Firm: yes  Incision: small amount of dried blood along lower edge of dressing, no evidence of active bleeding  Below umbilicus: no  Perineum: perineum intact  Lochia: mild  Extremities: Normal extremities, peripheral pulses and reflexes normal, 2+ pedal edema, sequentials on    Meds:  Current Facility-Administered Medications   Medication Dose   • labetalol (NORMODYNE,TRANDATE) injection 20-80 mg  20-80 mg    Or   • hydrALAZINE (APRESOLINE) injection 5-10 mg  5-10 mg   • magnesium sulfate 20 g/500mL infusion  1 g/hr   • lactated ringers (LR) infusion     • LR infusion     • oxytocin (PITOCIN) infusion (for postpartum)  2,000 mL/hr    Followed by   • oxytocin (PITOCIN) infusion (for postpartum)   mL/hr   • miSOPROStol (CYTOTEC) tablet 800 mcg  800 mcg   • lactated ringers infusion  1,000 mL   • LR infusion     • PRN oxytocin (PITOCIN) (20 Units/1000 mL) PRN for excessive uterine bleeding - See Admin Instr  125-999 mL/hr   • miSOPROStol (CYTOTEC) tablet 800 mcg  800 mcg   • docusate sodium (COLACE) capsule 100 mg  100 mg   • simethicone (MYLICON) chewable tab  80 mg  80 mg   • prenatal plus vitamin (STUARTNATAL 1+1) 27-1 MG tablet 1 Tab  1 Tab   • ibuprofen (MOTRIN) tablet 800 mg  800 mg   • acetaminophen (TYLENOL) tablet 325 mg  325 mg   • oxyCODONE-acetaminophen (PERCOCET) 5-325 MG per tablet 1 Tab  1 Tab   • oxyCODONE-acetaminophen (PERCOCET-10)  MG per tablet 1 Tab  1 Tab   • morphine (pf) 4 mg/ml injection 2 mg  2 mg   • ondansetron (ZOFRAN) syringe/vial injection 4 mg  4 mg    Or   • ondansetron (ZOFRAN ODT) dispertab 4 mg  4 mg   • diphenhydrAMINE (BENADRYL) tablet/capsule 25 mg  25 mg    Or   • diphenhydrAMINE (BENADRYL) injection 25 mg  25 mg   • lactated ringers infusion     • calcium GLUConate injection 1 g  1 g   • ondansetron (ZOFRAN) syringe/vial injection 4 mg  4 mg   • magnesium hydroxide (MILK OF MAGNESIA) suspension 30 mL  30 mL   • labetalol (NORMODYNE) tablet 300 mg  300 mg       Labs:   Recent Labs      18   1445  18   0611   WBC  9.8  13.7*   RBC  5.17  4.67   HEMOGLOBIN  14.5  13.1   HEMATOCRIT  43.3  40.0   MCV  83.8  85.7   MCH  28.0  28.1   MCHC  33.5*  32.8*   RDW  45.1  47.7   PLATELETCT  245  249   MPV  10.4  10.6       Assessment:  Chief Admitting Dx:  Pregnancy  Labor and delivery, indication for care  Delivery Type:   for severe preeclampsia  Tubal Ligation:  no    Plan:  Continue routine post partum care.  Continue on Magnesium sulfate for 24 post delivery  Hypertensive protocol in place  Labetalol 300mg PO BID      GOKUL Dockery.

## 2018-11-08 NOTE — OP REPORT
DATE OF SERVICE:  18     PREOPERATIVE DIAGNOSES:  1.  Intrauterine pregnancy at 34w6d  2.  Altered mental status  3.  Severe hypertension     POSTOPERATIVE DIAGNOSES:  1.  Intrauterine pregnancy at 34w6d  2.  same    PROCEDURE PERFORMED:  Primary low transverse  section.     SURGEON:  Carlos Fong MD     ASSISTANT: Dr. Nick     ANESTHESIA:  Spinal.     ANESTHESIOLOGIST:  MD Willian     SPECIMEN:  cord gasses, placenta to pathology     ESTIMATED BLOOD LOSS:  700 mL     FINDINGS:  A viable Female infant, weight 1988g, Apgars of 8 and 9 in vertex    presentation with clear amniotic fluid.  There was a normal uterus,   tubes, and ovaries bilaterally.     COMPLICATIONS:  None.     PROCEDURE:  After appropriate consents were obtained, the patient was taken to the operating room where spinal  anesthesia was applied without complications.  The patient was then prepped and draped in the usual sterile manner.  Clamp test on the skin verified adequate anesthesia.  A Pfannenstiel incision was made with a scalpel 3cm superior to the pubic symphysis and extended down to the rectus fascia.  The rectus fascia was incised with the scalpel and tented up. The underlying rectus muscle was  from the fascia first inferiorly and then superiorly using the mcmanus scissors.  The rectus muscle was  bluntly in the midline.  The peritoneum was entered bluntly in the midline. The peritoneum incision was extended superiorly and inferiorly with the Metzenbaum scissors with great care to avoid injury to underlying bowel or bladder.  Baldomero retractor was placed. The vesicouterine peritoneum was tented up and entered with Metzenbaum scissors, and a bladder flap was created.  An incision was made into the lower uterine segment transversely and the incision was extended bluntly.  Amniotomy was performed and there was noted to be clear amniotic fluid. The Infant's head was grasped and delivered atraumatically  followed by the remainder of the body without any complications.  The mouth and nares were suctioned. The cord was doubly clamped and cut and the infant was handed off to awaiting neonatology team.  The placenta was then allowed to spontaneously deliver. The uterus was cleared of clots and debris.  The hysterotomy incision was reapproximated with 1-0 Vicryl suture in a running locked fashion. A second layer of the same suture was placed to imbricate the incision creating a 2 layer closure.  Hemostasis was noted.  The tubes and ovaries were examined and noted to be normal.  The pelvis was irrigated with normal saline. The pericolic gutters were examined and any blood clots were removed.  The Baldomero retractor was removed. The peritoneum was reapproximated with 3-0 Vicryl suture running.  The rectus muscles were examined and hemostatic.  The fascia was reapproximated with 0 Vicryl suture running.  The subcutaneous fat was irrigated and any small bleeders were bovied for hemostasis.  The subcutaneous fat was then reapproximated with 3-0 Vicryl suture running.  The skin was reapproximated with 3-0 Vicryl suture running. Steri strips and a dressing were placed.  Sponge, needle, instrument, and lap counts were correct x2.  Patient tolerated the procedure well and went to recovery room in stable condition.        ____________________________________     Carlos Fong MD

## 2018-11-08 NOTE — CARE PLAN
Problem: Safety  Goal: Will remain free from falls  Outcome: PROGRESSING AS EXPECTED  Patient resting in bed with call light in reach. Bed in low position. Progressing as expected.     Problem: Infection  Goal: Will remain free from infection  Outcome: PROGRESSING AS EXPECTED  No S/S of infection present at this time. Will continue to monitor. Progressing as expected.

## 2018-11-08 NOTE — OR SURGEON
Immediate Post OP Note    PreOp Diagnosis: severe pre-eclampsia, altered mental status, severe hypertension    PostOp Diagnosis: same    Procedure(s):  PRIMARY C SECTION - Wound Class: Clean Contaminated    Surgeon(s):  Carlos Fong M.D.  Assist: Dr. Nick    Anesthesiologist/Type of Anesthesia:  Willian RONQUILLO/Spinal    Surgical Staff:  Circulator: Miladys Mcdaniel R.N.    Specimens removed if any:  * No specimens in log *    Estimated Blood Loss: 700 mL    Findings: Female infant, weight 1988g, APGARS 8,9.    Complications: none        11/7/2018 5:45 PM Carlos Fong M.D.

## 2018-11-09 LAB
BACTERIA UR CULT: NORMAL
SIGNIFICANT IND 70042: NORMAL
SITE SITE: NORMAL
SOURCE SOURCE: NORMAL

## 2018-11-09 PROCEDURE — 700111 HCHG RX REV CODE 636 W/ 250 OVERRIDE (IP): Performed by: OBSTETRICS & GYNECOLOGY

## 2018-11-09 PROCEDURE — 700112 HCHG RX REV CODE 229: Performed by: OBSTETRICS & GYNECOLOGY

## 2018-11-09 PROCEDURE — 700102 HCHG RX REV CODE 250 W/ 637 OVERRIDE(OP): Performed by: OBSTETRICS & GYNECOLOGY

## 2018-11-09 PROCEDURE — A9270 NON-COVERED ITEM OR SERVICE: HCPCS | Performed by: OBSTETRICS & GYNECOLOGY

## 2018-11-09 PROCEDURE — 700105 HCHG RX REV CODE 258: Performed by: OBSTETRICS & GYNECOLOGY

## 2018-11-09 PROCEDURE — 770002 HCHG ROOM/CARE - OB PRIVATE (112)

## 2018-11-09 PROCEDURE — 59515 CESAREAN DELIVERY: CPT | Performed by: OBSTETRICS & GYNECOLOGY

## 2018-11-09 RX ORDER — NIFEDIPINE 60 MG/1
60 TABLET, EXTENDED RELEASE ORAL
Status: DISCONTINUED | OUTPATIENT
Start: 2018-11-10 | End: 2018-11-11 | Stop reason: HOSPADM

## 2018-11-09 RX ORDER — HYDRALAZINE HYDROCHLORIDE 20 MG/ML
20 INJECTION INTRAMUSCULAR; INTRAVENOUS ONCE
Status: COMPLETED | OUTPATIENT
Start: 2018-11-09 | End: 2018-11-09

## 2018-11-09 RX ORDER — NIFEDIPINE 30 MG/1
30 TABLET, EXTENDED RELEASE ORAL
Status: DISCONTINUED | OUTPATIENT
Start: 2018-11-09 | End: 2018-11-09

## 2018-11-09 RX ADMIN — OXYCODONE HYDROCHLORIDE AND ACETAMINOPHEN 1 TABLET: 10; 325 TABLET ORAL at 12:25

## 2018-11-09 RX ADMIN — IBUPROFEN 800 MG: 800 TABLET, FILM COATED ORAL at 12:25

## 2018-11-09 RX ADMIN — NIFEDIPINE 30 MG: 30 TABLET, FILM COATED, EXTENDED RELEASE ORAL at 09:40

## 2018-11-09 RX ADMIN — LABETALOL HYDROCHLORIDE 400 MG: 100 TABLET, FILM COATED ORAL at 03:23

## 2018-11-09 RX ADMIN — DOCUSATE SODIUM 100 MG: 100 CAPSULE, LIQUID FILLED ORAL at 12:25

## 2018-11-09 RX ADMIN — LABETALOL HYDROCHLORIDE 400 MG: 100 TABLET, FILM COATED ORAL at 19:33

## 2018-11-09 RX ADMIN — OXYCODONE HYDROCHLORIDE AND ACETAMINOPHEN 1 TABLET: 10; 325 TABLET ORAL at 00:23

## 2018-11-09 RX ADMIN — HYDRALAZINE HYDROCHLORIDE 10 MG: 20 INJECTION INTRAMUSCULAR; INTRAVENOUS at 05:48

## 2018-11-09 RX ADMIN — LABETALOL HYDROCHLORIDE 400 MG: 100 TABLET, FILM COATED ORAL at 11:19

## 2018-11-09 RX ADMIN — DOCUSATE SODIUM 100 MG: 100 CAPSULE, LIQUID FILLED ORAL at 23:58

## 2018-11-09 RX ADMIN — Medication 1 TABLET: at 08:27

## 2018-11-09 RX ADMIN — IBUPROFEN 800 MG: 800 TABLET, FILM COATED ORAL at 23:58

## 2018-11-09 RX ADMIN — HYDRALAZINE HYDROCHLORIDE 5 MG: 20 INJECTION INTRAMUSCULAR; INTRAVENOUS at 05:23

## 2018-11-09 RX ADMIN — SODIUM CHLORIDE, POTASSIUM CHLORIDE, SODIUM LACTATE AND CALCIUM CHLORIDE: 600; 310; 30; 20 INJECTION, SOLUTION INTRAVENOUS at 00:23

## 2018-11-09 RX ADMIN — HYDRALAZINE HYDROCHLORIDE 20 MG: 20 INJECTION INTRAMUSCULAR; INTRAVENOUS at 12:19

## 2018-11-09 RX ADMIN — OXYCODONE HYDROCHLORIDE AND ACETAMINOPHEN 1 TABLET: 10; 325 TABLET ORAL at 17:48

## 2018-11-09 RX ADMIN — OXYCODONE HYDROCHLORIDE AND ACETAMINOPHEN 1 TABLET: 10; 325 TABLET ORAL at 06:40

## 2018-11-09 ASSESSMENT — PAIN SCALES - GENERAL
PAINLEVEL_OUTOF10: 7
PAINLEVEL_OUTOF10: 7
PAINLEVEL_OUTOF10: 2
PAINLEVEL_OUTOF10: 3
PAINLEVEL_OUTOF10: 2
PAINLEVEL_OUTOF10: 5
PAINLEVEL_OUTOF10: 7
PAINLEVEL_OUTOF10: 7

## 2018-11-09 ASSESSMENT — PATIENT HEALTH QUESTIONNAIRE - PHQ9
SUM OF ALL RESPONSES TO PHQ9 QUESTIONS 1 AND 2: 0
1. LITTLE INTEREST OR PLEASURE IN DOING THINGS: NOT AT ALL
2. FEELING DOWN, DEPRESSED, IRRITABLE, OR HOPELESS: NOT AT ALL

## 2018-11-09 NOTE — PROGRESS NOTES
EDC -18  EGA - 34.6 C/S D1    0700 - Report received from SHAVONNE Castillo RN. POC discussed, care assumed.   0800-  Full Assessment complete. VSS. Pt reports good pain relief from pain medication this am. Denies HA, visual changes and epigastric pain. POC discussed with pt and family members, all questions answered.   0945 - assisted pt with pumping. Unable to produce/express colostrom at this time. Pt medicated with Ibuprofen for pain 10 at surgical site.  1305 - pt medicated with Percocet PO for pain at surgical site 6/10. Pt pumping with lactation consultant. No needs at this time.  1415 - pt dangled and ambulated in bedroom for 5 min with assistance of 2. Tolerated well. Denies dizziness. Linens changed.   1500 - Dr. Fong updated on pt. Orders received to d/c mag and jose at 24hrs after delivery.  1630 - magnesium turned off. POC discuss with pt and family. Pt pumping. Will dc jose when pt done  175 - BP elevated, retaken. Dr. Fong notified. Orders received to retake in 20-30 min.  182 - BP still elevated. Orders received to give 20 mg IV labetalol-given (see MAR). Dr. Maurer at bedside. Update given. Dressing removed by MD. POC discussed.  184 - BP still elevated. MD notified. Order received for 20 mg IV-given (see MAR).   - report given to SHAVONNE Gonzalez RN. POC discussed.

## 2018-11-09 NOTE — PROGRESS NOTES
1900: report received from Leni SUAREZ. POC dicussed care assumed.   1901: /91, Leni Petersen RN updated MD, orders given to Leni Petersen to give Nifedpine at this time (see MAR).  1909: Nifedipine given ( see MAR)  1915: pt pumping  1955: Dr. Armendariz called regarding pt elevated 's/90's after giving Nifedipine. Orders to give hydralazine based off hypertensive protocol.   2010: /90, hydralazine never given, will continue to monitor at this time   2230: Dr. Aremndariz on floor, report given regarding pt status. Orders to keep pt on labor and delivery until morning, if pressures stable  pt okay to transfer to postpartum in AM. Orders to give hydralazine based off hypertensive protocol if BP's become elevated throughout night. Okay to take garcia out at this time. Pt pumping at this time, will remove garcia after pt done pumping.   2300: garcia removed   0130: pt pumping  0415: pt pumping  0505: /105  0523:/101, hydralazine pushed ( see MAR)  0548: /104, second dose of hydralazine pushed ( see MAR)  0554: Dr. Armendariz called and updated. No further orders at this time. Continue to monitor.   0611: BP down to 144/79  0700: Report given to Leni SUAREZ.

## 2018-11-09 NOTE — CARE PLAN
Problem: Infection  Goal: Will remain free from infection    Intervention: Implement standard precautions and perform hand washing before and after patient contact  Hand hygiene performed appropriately, education provided       Problem: Pain Management  Goal: Pain level will decrease to patient's comfort goal  Pt has pain medication available for pain, pt calls out appropriately when pain medication needed

## 2018-11-09 NOTE — PROGRESS NOTES
S/P C/S DD 2    0700 - Report received from SHAVONNE Castillo RN. POC discussed, care assumed.   0828 -  Full Assessment complete. VSS. Incision site clean dry, no bleeding. Fundus firm one fingerbreadth below umbilicus. Scant lochia at this time. Pt reports pain at 2/10. Denies pain medication at this time. POC discussed with pt and family members, all questions answered.   0940 - Assisted pt with applying breast pump.   1000 - pt achieved 3 ml colostrum-taken to NICU.   1119 - BP elevated. Pt medicated with scheduled oral labetalol. MD aware. Does not wish to treat at this time.   1211 - BP continues to be elevated. MD notified. Orders received for IV hydralazine-given (see MAR). Pt medicated for pain at surgical site 7/10-see MAR.  1244 - BP resolved at this timme  1328 - pt pumping. VS WNL  1400 - pt achieved 2 ml of colostrom-taking to NICU. Pt up to shower. Linens changed  1443 - BP WNL. Pt taking to NICU via wheelchair in stable condition by CNA and FOB  1600 - pt back to bed  1626 - Dr. Fong updated on BP's. No new orders at this time.  1745 - pt medicated for pain-see MAR. Pt taken to NICU by CNA via wheelchair  1900 - pt back to room in stable condition. Report given to JESSICA Juárez RN. POC discussed

## 2018-11-10 LAB
BACTERIA WND AEROBE CULT: NORMAL
GRAM STN SPEC: NORMAL
SIGNIFICANT IND 70042: NORMAL
SITE SITE: NORMAL
SOURCE SOURCE: NORMAL

## 2018-11-10 PROCEDURE — 700102 HCHG RX REV CODE 250 W/ 637 OVERRIDE(OP): Performed by: OBSTETRICS & GYNECOLOGY

## 2018-11-10 PROCEDURE — 770002 HCHG ROOM/CARE - OB PRIVATE (112)

## 2018-11-10 PROCEDURE — A9270 NON-COVERED ITEM OR SERVICE: HCPCS | Performed by: OBSTETRICS & GYNECOLOGY

## 2018-11-10 RX ADMIN — IBUPROFEN 800 MG: 800 TABLET, FILM COATED ORAL at 19:59

## 2018-11-10 RX ADMIN — IBUPROFEN 800 MG: 800 TABLET, FILM COATED ORAL at 08:59

## 2018-11-10 RX ADMIN — LABETALOL HYDROCHLORIDE 400 MG: 100 TABLET, FILM COATED ORAL at 19:59

## 2018-11-10 RX ADMIN — NIFEDIPINE 60 MG: 60 TABLET, FILM COATED, EXTENDED RELEASE ORAL at 06:31

## 2018-11-10 RX ADMIN — Medication 1 TABLET: at 06:31

## 2018-11-10 RX ADMIN — OXYCODONE AND ACETAMINOPHEN 1 TABLET: 5; 325 TABLET ORAL at 09:00

## 2018-11-10 RX ADMIN — OXYCODONE AND ACETAMINOPHEN 1 TABLET: 5; 325 TABLET ORAL at 20:00

## 2018-11-10 RX ADMIN — LABETALOL HYDROCHLORIDE 400 MG: 100 TABLET, FILM COATED ORAL at 03:18

## 2018-11-10 RX ADMIN — LABETALOL HYDROCHLORIDE 400 MG: 100 TABLET, FILM COATED ORAL at 13:40

## 2018-11-10 ASSESSMENT — PAIN SCALES - GENERAL
PAINLEVEL_OUTOF10: 4
PAINLEVEL_OUTOF10: 3
PAINLEVEL_OUTOF10: 1
PAINLEVEL_OUTOF10: 4
PAINLEVEL_OUTOF10: 3
PAINLEVEL_OUTOF10: 0

## 2018-11-10 NOTE — PROGRESS NOTES
Assessed incision. Clean, dry and intact. Educated on showering, heavy lifting and when to call md.

## 2018-11-10 NOTE — PROGRESS NOTES
Patient received from L&D via  to room S322. Report received from DANIELA Dumont and assumed care of pt. Patient oriented to room and surroundings, call system, skylight education, and visiting policy. 0-10 pain scale and pain management discussed. Patient assessment completed. Discussed pain management plan and patient will request pain medication as needed. Patient denies dizziness and headaches; states she is voiding w/o difficulty and passing flatus. Reviewed plan of care, all questions answered, and rounding in place.

## 2018-11-10 NOTE — PROGRESS NOTES
[facility] POSTPARTUM PROGRESS NOTE    PATIENT ID:  NAME:  Flori Car  MRN:               [id number]  YOB: 1995     23 y.o. female admitted  now at 34w6d POD#3 s/p  secondary to preeclampsia with severe features.       Subjective:   Abdominal pain: mild  Ambulating: yes  tolerating liquids: yes  tolerating regular diet: yes  Flatus: yes  BM: no  Bleeding: mild  Voiding: yes  Dizziness: no    Objective:    Vitals:    18 2218 18 2335 11/10/18 0314 11/10/18 0400   BP: 156/84 152/88 (!) 161/92 142/88   Pulse: 90 94 97 95   Resp:     Temp:  37.1 °C (98.8 °F) 36.7 °C (98.1 °F) 36.8 °C (98.3 °F)   TempSrc:       SpO2:  95% 96% 95%   Weight:       Height:         General: No acute distress, resting comfortably in bed.  HEENT: EOMI  Cardiovascular: Heart RRR with no murmurs, rubs or gallops. Distal Pulses 2+  Respiratory: symmetric chest expansion, lungs CTA bilaterally with no wheezes rales or rhonci  Abdomen: soft, mildly tender around incision which is clean, dry and intact, fundus firm, +BS  Genitourinary: lochia light, denies excessive vaginal bleeding  Neuro: no focal deficits.       Recent Labs      18   1445  18   0611   WBC  9.8  13.7*   RBC  5.17  4.67   HEMOGLOBIN  14.5  13.1   HEMATOCRIT  43.3  40.0   MCV  83.8  85.7   MCH  28.0  28.1   RDW  45.1  47.7   PLATELETCT  245  249   MPV  10.4  10.6   NEUTSPOLYS  66.00   --    LYMPHOCYTES  27.70   --    MONOCYTES  4.80   --    EOSINOPHILS  0.90   --    BASOPHILS  0.30   --      Recent Labs      18   1445  18   0001   SODIUM  134*  131*   POTASSIUM  3.9  4.2   CHLORIDE  105  101   CO2  22  20   GLUCOSE  84  133*   BUN  14  14       Current Meds:   Current Facility-Administered Medications   Medication Dose Frequency Provider Last Rate Last Dose   • NIFEdipine SR (PROCARDIA-XL) tablet 60 mg  60 mg Q DAY Carlos Fong M.D.   60 mg at 11/10/18 0631   • hydrALAZINE (APRESOLINE) injection 5-10 mg   5-10 mg PRN Ruiz Armendariz M.D.   10 mg at 11/09/18 0548   • labetalol (NORMODYNE) tablet 400 mg  400 mg Q8HR Carlos Fong M.D.   400 mg at 11/10/18 0318   • lactated ringers (LR) infusion   Continuous Carlos Fong M.D.       • LR infusion   Continuous Carlos Fong M.D. 100 mL/hr at 11/08/18 0215     • oxytocin (PITOCIN) infusion (for postpartum)   mL/hr Continuous Carlos Fong M.D.       • miSOPROStol (CYTOTEC) tablet 800 mcg  800 mcg Once PRN Carlos Fong M.D.       • lactated ringers infusion  1,000 mL Continuous Suzanne Dorsey M.D.       • LR infusion   PRN Carlos Fong M.D.       • PRN oxytocin (PITOCIN) (20 Units/1000 mL) PRN for excessive uterine bleeding - See Admin Instr  125-999 mL/hr Once PRN Carlos Fong M.D.       • miSOPROStol (CYTOTEC) tablet 800 mcg  800 mcg Once PRN Carlos Fong M.D.       • docusate sodium (COLACE) capsule 100 mg  100 mg BID PRN Carlos Fong M.D.   100 mg at 11/09/18 2358   • simethicone (MYLICON) chewable tab 80 mg  80 mg 4X/DAY PRN Carlos Fong M.D.       • prenatal plus vitamin (STUARTNATAL 1+1) 27-1 MG tablet 1 Tab  1 Tab QAM Carlos Fong M.D.   1 Tab at 11/10/18 0631   • ibuprofen (MOTRIN) tablet 800 mg  800 mg Q8HRS PRN Carlos Fong M.D.   800 mg at 11/09/18 2358   • acetaminophen (TYLENOL) tablet 325 mg  325 mg Q4HRS PRN Carlos Fong M.D.       • oxyCODONE-acetaminophen (PERCOCET) 5-325 MG per tablet 1 Tab  1 Tab Q4HRS PRN Carlos Fong M.D.       • oxyCODONE-acetaminophen (PERCOCET-10)  MG per tablet 1 Tab  1 Tab Q4HRS PRN Carlos Fong M.D.   1 Tab at 11/09/18 1748   • morphine (pf) 4 mg/ml injection 2 mg  2 mg Q3HRS PRN Carlos Fong M.D.       • ondansetron (ZOFRAN) syringe/vial injection 4 mg  4 mg Q6HRS PRN Carlos Fong M.D.        Or   • ondansetron (ZOFRAN ODT) dispertab 4 mg  4 mg Q6HRS PRN Carlos Fong M.D.       •  diphenhydrAMINE (BENADRYL) tablet/capsule 25 mg  25 mg Q6HRS PRN Carlos Fong M.D.        Or   • diphenhydrAMINE (BENADRYL) injection 25 mg  25 mg Q6HRS PRN Carlos Fong M.D.       • lactated ringers infusion   Continuous Carlos Fong M.D. 125 mL/hr at 18 0023     • calcium GLUConate injection 1 g  1 g Once PRN Carlos Fong M.D.       • ondansetron (ZOFRAN) syringe/vial injection 4 mg  4 mg Q6HRS PRN Carlos Fong M.D.       • magnesium hydroxide (MILK OF MAGNESIA) suspension 30 mL  30 mL Q6HRS PRN Carlos Fong M.D.         Last reviewed on 2018  5:05 PM by Miladys Mcdaniel R.N.        Assessment:  23 y.o. Female POD#3 s/p  secondary to preeclampsia with severe features.      Plan:   1. Continue normal postpartum care. Patient on scheduled labetalol and nifedipine for BP control.     Anticipate D/C home on POD#4    Tyrone Solorzano M.D.

## 2018-11-10 NOTE — PROGRESS NOTES
1900: Recieved report from PHOENIX Petersen RN.    2130: BP's reported to BETTINA Chapin RN. RN to recheck pt BP at 2200.    2200: Pt away at NICU.    2230: BP reported to BETTINA Chapin RN. Pt may go to PPU.    2330: Pt transferred to PPU via wheelchair in stable condition. Report given to DANIELA Ruiz.

## 2018-11-10 NOTE — CARE PLAN
Problem: Alteration in comfort related to surgical incision and/or after birth pains  Goal: Patient verbalizes acceptable pain level    Intervention: Assess effectiveness of pain meds within 1 hour of administration  Patient educated to continue with prn pain medication as needed. Patient states pain 4/10 upon assessment. Encouraged to keep ibuprofen in system every 8 hours for cramping and percocet as needed. Discussed blood pressure and relation to pain control. Provided wheelchair and encouraged increase ambulation to NICU once pain under control. Fob in room for assistance and education teaching.

## 2018-11-10 NOTE — CONSULTS
Spoke with mom regarding pumping. Mom states she has no questions or concerns. Mom denies discomfort with pumping and her goal is 8 sessions per 24 hours for 15 minutes per session. HHP paperwork given and dad will  the pump from [facility] tomorrow prior to mom's discharge.

## 2018-11-11 VITALS
HEIGHT: 63 IN | TEMPERATURE: 97.2 F | SYSTOLIC BLOOD PRESSURE: 148 MMHG | RESPIRATION RATE: 18 BRPM | BODY MASS INDEX: 48.73 KG/M2 | OXYGEN SATURATION: 96 % | WEIGHT: 275 LBS | DIASTOLIC BLOOD PRESSURE: 112 MMHG | HEART RATE: 77 BPM

## 2018-11-11 PROBLEM — O14.13 PREECLAMPSIA, SEVERE, THIRD TRIMESTER: Status: ACTIVE | Noted: 2018-11-11

## 2018-11-11 PROCEDURE — 3E0234Z INTRODUCTION OF SERUM, TOXOID AND VACCINE INTO MUSCLE, PERCUTANEOUS APPROACH: ICD-10-PCS | Performed by: OBSTETRICS & GYNECOLOGY

## 2018-11-11 PROCEDURE — A9270 NON-COVERED ITEM OR SERVICE: HCPCS | Performed by: OBSTETRICS & GYNECOLOGY

## 2018-11-11 PROCEDURE — 700111 HCHG RX REV CODE 636 W/ 250 OVERRIDE (IP): Performed by: OBSTETRICS & GYNECOLOGY

## 2018-11-11 PROCEDURE — 700102 HCHG RX REV CODE 250 W/ 637 OVERRIDE(OP): Performed by: OBSTETRICS & GYNECOLOGY

## 2018-11-11 PROCEDURE — 90732 PPSV23 VACC 2 YRS+ SUBQ/IM: CPT | Performed by: OBSTETRICS & GYNECOLOGY

## 2018-11-11 PROCEDURE — 90471 IMMUNIZATION ADMIN: CPT

## 2018-11-11 RX ORDER — OXYCODONE HYDROCHLORIDE AND ACETAMINOPHEN 5; 325 MG/1; MG/1
1-2 TABLET ORAL EVERY 4 HOURS PRN
Qty: 20 TAB | Refills: 0 | Status: SHIPPED | OUTPATIENT
Start: 2018-11-11 | End: 2018-11-16

## 2018-11-11 RX ORDER — PSEUDOEPHEDRINE HCL 30 MG
100 TABLET ORAL 2 TIMES DAILY PRN
Qty: 60 CAP | Refills: 1 | Status: SHIPPED | OUTPATIENT
Start: 2018-11-11 | End: 2019-09-30

## 2018-11-11 RX ORDER — LABETALOL 200 MG/1
400 TABLET, FILM COATED ORAL EVERY 8 HOURS
Qty: 90 TAB | Refills: 3 | Status: ON HOLD | OUTPATIENT
Start: 2018-11-11 | End: 2019-02-21

## 2018-11-11 RX ADMIN — PNEUMOCOCCAL VACCINE POLYVALENT 25 MCG
25; 25; 25; 25; 25; 25; 25; 25; 25; 25; 25; 25; 25; 25; 25; 25; 25; 25; 25; 25; 25; 25; 25 INJECTION, SOLUTION INTRAMUSCULAR; SUBCUTANEOUS at 08:41

## 2018-11-11 RX ADMIN — LABETALOL HYDROCHLORIDE 400 MG: 100 TABLET, FILM COATED ORAL at 03:40

## 2018-11-11 RX ADMIN — OXYCODONE AND ACETAMINOPHEN 1 TABLET: 5; 325 TABLET ORAL at 00:14

## 2018-11-11 RX ADMIN — NIFEDIPINE 60 MG: 60 TABLET, FILM COATED, EXTENDED RELEASE ORAL at 05:57

## 2018-11-11 RX ADMIN — LABETALOL HYDROCHLORIDE 400 MG: 100 TABLET, FILM COATED ORAL at 11:50

## 2018-11-11 RX ADMIN — Medication 1 TABLET: at 08:40

## 2018-11-11 ASSESSMENT — EDINBURGH POSTNATAL DEPRESSION SCALE (EPDS)
THE THOUGHT OF HARMING MYSELF HAS OCCURRED TO ME: NEVER
I HAVE BLAMED MYSELF UNNECESSARILY WHEN THINGS WENT WRONG: NOT VERY OFTEN
I HAVE BEEN SO UNHAPPY THAT I HAVE BEEN CRYING: NO, NEVER
I HAVE BEEN SO UNHAPPY THAT I HAVE HAD DIFFICULTY SLEEPING: NOT AT ALL
I HAVE BEEN ANXIOUS OR WORRIED FOR NO GOOD REASON: HARDLY EVER
I HAVE FELT SCARED OR PANICKY FOR NO GOOD REASON: NO, NOT MUCH
I HAVE BEEN ABLE TO LAUGH AND SEE THE FUNNY SIDE OF THINGS: AS MUCH AS I ALWAYS COULD
THINGS HAVE BEEN GETTING ON TOP OF ME: NO, MOST OF THE TIME I HAVE COPED QUITE WELL
I HAVE LOOKED FORWARD WITH ENJOYMENT TO THINGS: AS MUCH AS I EVER DID
I HAVE FELT SAD OR MISERABLE: NOT VERY OFTEN

## 2018-11-11 ASSESSMENT — PAIN SCALES - GENERAL
PAINLEVEL_OUTOF10: 2

## 2018-11-11 NOTE — PROGRESS NOTES
Received orders from Dr. Smith patient is cleared for discharge despite increased blood pressure and educated to follow up in 2 days with doctor.

## 2018-11-11 NOTE — DISCHARGE SUMMARY
Discharge Summary:      Flori Car      Admit Date:   2018  Discharge Date:  2018     Admitting diagnosis:  Pregnancy  Labor and delivery, indication for care  Discharge Diagnosis: Status post  for pre-eclampsia with severe features.  Pregnancy Complications: preeclampsia  Tubal Ligation:  no        History:  Past Medical History:   Diagnosis Date   • Asthma    • Preeclampsia, severe, third trimester 2018     OB History    Para Term  AB Living   5 1   1 2 1   SAB TAB Ectopic Molar Multiple Live Births   1       0 1      # Outcome Date GA Lbr Dwayne/2nd Weight Sex Delivery Anes PTL Lv   5  18 34w6d  1.988 kg (4 lb 6.1 oz) F CS-LTranv EPI Y HARRY      Complications: Preeclampsia complicating hypertension   4             3             2 AB            1 SAB                    Patient has no known allergies.  Patient Active Problem List    Diagnosis Date Noted   • Preeclampsia, severe, third trimester 2018   • Pregnancy with 33 completed weeks gestation 10/31/2018   • 32 weeks gestation of pregnancy 10/23/2018   • Late prenatal care 10/23/2018        Hospital Course:   23 y.o. , was admitted with the above mentioned diagnosis, underwent  delivery, for pre-eclampsia with severe features. Patient postpartum course was complicated by BP control issues, otherwise unremarkable, with progressive advancement in diet , ambulation and toleration of oral analgesia. Patient without complaints today and desires discharge. She is fairly well controlled on nifedipine 60mg XL daily and labetalol 400 mg PO TID. She denies HA, no vision changes.     Vitals:    11/10/18 2000 18 0000 18 0345 18 0445   BP: 131/84 155/92 (!) 176/108 155/97   Pulse: 100 90 88    Resp:     Temp: 36.7 °C (98 °F) 36.7 °C (98.1 °F) 36.6 °C (97.9 °F)    TempSrc:       SpO2: 94% 95% 97%    Weight:       Height:           Current  Facility-Administered Medications   Medication Dose   • NIFEdipine SR (PROCARDIA-XL) tablet 60 mg  60 mg   • hydrALAZINE (APRESOLINE) injection 5-10 mg  5-10 mg   • labetalol (NORMODYNE) tablet 400 mg  400 mg   • lactated ringers (LR) infusion     • LR infusion     • oxytocin (PITOCIN) infusion (for postpartum)   mL/hr   • miSOPROStol (CYTOTEC) tablet 800 mcg  800 mcg   • lactated ringers infusion  1,000 mL   • LR infusion     • PRN oxytocin (PITOCIN) (20 Units/1000 mL) PRN for excessive uterine bleeding - See Admin Instr  125-999 mL/hr   • miSOPROStol (CYTOTEC) tablet 800 mcg  800 mcg   • docusate sodium (COLACE) capsule 100 mg  100 mg   • simethicone (MYLICON) chewable tab 80 mg  80 mg   • prenatal plus vitamin (STUARTNATAL 1+1) 27-1 MG tablet 1 Tab  1 Tab   • ibuprofen (MOTRIN) tablet 800 mg  800 mg   • acetaminophen (TYLENOL) tablet 325 mg  325 mg   • oxyCODONE-acetaminophen (PERCOCET) 5-325 MG per tablet 1 Tab  1 Tab   • oxyCODONE-acetaminophen (PERCOCET-10)  MG per tablet 1 Tab  1 Tab   • morphine (pf) 4 mg/ml injection 2 mg  2 mg   • ondansetron (ZOFRAN) syringe/vial injection 4 mg  4 mg    Or   • ondansetron (ZOFRAN ODT) dispertab 4 mg  4 mg   • diphenhydrAMINE (BENADRYL) tablet/capsule 25 mg  25 mg    Or   • diphenhydrAMINE (BENADRYL) injection 25 mg  25 mg   • lactated ringers infusion     • calcium GLUConate injection 1 g  1 g   • ondansetron (ZOFRAN) syringe/vial injection 4 mg  4 mg   • magnesium hydroxide (MILK OF MAGNESIA) suspension 30 mL  30 mL       Exam:  Breast Exam: negative  Abdomen: obese, soft, non-tender. BS normal. No masses,  No organomegaly  Fundus Non Tender: yes  Incision: dry and intact  Perineum: n/a  Extremity: pedal edema 1+ to shin     Labs:        Activity:   Discharge to home  No heavy lifting  Pelvic Rest x 6 weeks    Assessment:  Course complicated by blood pressure control issues otherwise meeting all milestones     Follow [facility]: [facility] for BP check in  24-48 hours  Incision check 1 week      Discharge Meds:   Current Outpatient Prescriptions   Medication Sig Dispense Refill   • oxyCODONE-acetaminophen (PERCOCET) 5-325 MG Tab Take 1-2 Tabs by mouth every four hours as needed for up to 5 days. 20 Tab 0   • labetalol (NORMODYNE) 200 MG Tab Take 2 Tabs by mouth every 8 hours. 90 Tab 3   • [START ON 11/12/2018] NIFEdipine SR (ADALAT CC) 60 MG CR tablet Take 1 Tab by mouth every day. 60 Tab 3   • docusate sodium 100 MG Cap Take 100 mg by mouth 2 times a day as needed for Constipation. 60 Cap 1       Kevin Smith M.D.

## 2018-11-11 NOTE — PROGRESS NOTES
Pt assessment complete, wnl.  Pt ambulating and voiding without difficulty, states positive flatus.  Bonding well with infant, visits in NICU.  Using breast pump efficiently.  Plan of care discussed with patient for the night.  Questions answered.  FOB at bedside and supportive.  Encouraged to call with needs, call light in reach.

## 2018-11-11 NOTE — CARE PLAN
Problem: Potential knowledge deficit related to lack of understanding of self and  care  Goal: Patient will demonstrate ability to care for self and infant    Intervention: Educate patient verbally, by demonstration, and with written and video material  Patient continues to pump and ambulate to nicu. Observed breastpump obtained for discharge Patient continues to deny prn pain medication upon assessment. Patient plans to discharge this shift. Patient educated on blood pressure increase signs and symptoms and when to call md. Discussed heavy bleeding, pain control and pumping frequency for discharge. Fob in room for teaching.

## 2018-11-11 NOTE — PROGRESS NOTES
Called Dr. Smith to inform blood pressure 148/112 and do they want to discharge? Md will call back with orders.

## 2018-11-11 NOTE — PROGRESS NOTES
Patient educated when to call md for post delivery hemmorage. Patient education to feed every 2-3 hours on demand. Patient educated to call for follow up appointment in 2 days for blood pressure check. Patient received discharge education and denies self care questions.

## 2018-11-11 NOTE — CARE PLAN
Problem: Altered physiologic condition related to postoperative  delivery  Goal: Patient physiologically stable as evidenced by normal lochia, palpable uterine involution and vital signs within normal limits  Outcome: PROGRESSING AS EXPECTED  Fundus firm, lochia light, VSS.    Problem: Alteration in comfort related to surgical incision and/or after birth pains  Goal: Patient is able to ambulate, care for self and infant with acceptable pain level  Outcome: PROGRESSING AS EXPECTED  Pt ambulating and caring for self, visiting infant in NICU, states acceptable pain level, taking po pain medication as needed.

## 2018-11-11 NOTE — DISCHARGE INSTRUCTIONS
POSTPARTUM DISCHARGE INSTRUCTIONS FOR MOM    YOB: 1995   Age: 23 y.o.               Admit Date: 2018     Discharge Date: 2018  Attending Doctor:  Carlos Fong M.D.                  Allergies:  Patient has no known allergies.    Discharged to home by car. Discharged via walking, hospital escort: Refused.  Special equipment needed: Not Applicable  Belongings with: Personal  Be sure to schedule a follow-up appointment with your primary care doctor or any specialists as instructed.     Discharge Plan:   Diet Plan: Discussed  Activity Level: Discussed  Confirmed Follow up Appointment: Patient to Call and Schedule Appointment  Confirmed Symptoms Management: Discussed  Medication Reconciliation Updated: Yes    REASONS TO CALL YOUR OBSTETRICIAN:  1.   Persistent fever or shaking chills (Temperature higher than 100.4)  2.   Heavy bleeding (soaking more than 1 pad per hour); Passing clots  3.   Foul odor from vagina  4.   Mastitis (Breast infection; breast pain, chills, fever, redness)  5.   Urinary pain, burning or frequency  6.   Episiotomy infection  7.   Abdominal incision infection  8.   Severe depression longer than 24 hours    HAND WASHING  · Prior to handling the baby.  · Before breastfeeding or bottle feeding baby.  · After using the bathroom or changing the baby's diaper.    WOUND CARE  Ask your physician for additional care instructions.  In general:    ·  Incision:      · Keep clean and dry.    · Do NOT lift anything heavier than your baby for up to 6 weeks.    · There should not be any opening or pus.      VAGINAL CARE  · Nothing inside vagina for 6 weeks: no sexual intercourse, tampons or douching.  · Bleeding may continue for 2-4 weeks.  Amount may vary.    · Call your physician for heavy bleeding which means soaking more than 1 pad per hour    BIRTH CONTROL  · It is possible to become pregnant at any time after delivery and while breastfeeding.  · Plan to discuss a method  "of birth control with your physician at your follow up visit. visit.    DIET AND ELIMINATION  · Eating more fiber (bran cereal, fruits, and vegetables) and drinking plenty of fluids will help to avoid constipation.  · Urinary frequency after childbirth is normal.    POSTPARTUM BLUES  During the first few days after birth, you may experience a sense of the \"blues\" which may include impatience, irritability or even crying.  These feeling come and go quickly.  However, as many as 1 in 10 women experience emotional symptoms known as postpartum depression.    Postpartum depression:  May start as early as the second or third day after delivery or take several weeks or months to develop.  Symptoms of \"blues\" are present, but are more intense:  Crying spells; loss of appetite; feelings of hopelessness or loss of control; fear of touching the baby; over concern or no concern at all about the baby; little or no concern about your own appearance/caring for yourself; and/or inability to sleep or excessive sleeping.  Contact your physician if you are experiencing any of these symptoms.    Crisis Hotline:  · [place] Crisis Hotline:  2-185-GLYKBED  Or 1-973.323.8796  · [place] Crisis Hotline:  1-432.961.2422  Or 001-523-7806    PREVENTING SHAKEN BABY:  If you are angry or stressed, PUT THE BABY IN THE CRIB, step away, take some deep breaths, and wait until you are calm to care for the baby.  DO NOT SHAKE THE BABY.  You are not alone, call a supporter for help.    · Crisis Call Center 24/7 crisis line 652-172-2376 or 1-500.524.7087  · You can also text them, text \"ANSWER\" to 494255    QUIT SMOKING/TOBACCO USE:  I understand the use of any tobacco products increases my chance of suffering from future heart disease and could cause other illnesses which may shorten my life. Quitting the use of tobacco products is the single most important thing I can do to improve my health. For further information on smoking / tobacco cessation call " a Toll Free Quit Line at 1-541.811.7675 (*[employer]) or 1-643.705.2775 (American Lung Association) or you can access the web based program at www.lungusa.org.    · [place] Tobacco Users Help Line:  (167) 671-9732       Toll Free: 1-118.995.5737  · Quit Tobacco Program [facility] (683)958-6204    DEPRESSION / SUICIDE RISK:  As you are discharged from this [facility] [[email or web address]], it is important to learn how to keep safe from harming yourself.    Recognize the warning signs:  · Abrupt changes in personality, positive or negative- including increase in energy   · Giving away possessions  · Change in eating patterns- significant weight changes-  positive or negative  · Change in sleeping patterns- unable to sleep or sleeping all the time   · Unwillingness or inability to communicate  · Depression  · Unusual sadness, discouragement and loneliness  · Talk of wanting to die  · Neglect of personal appearance   · Rebelliousness- reckless behavior  · Withdrawal from people/activities they love  · Confusion- inability to concentrate     If you or a loved one observes any of these behaviors or has concerns about self-harm, here's what you can do:  · Talk about it- your feelings and reasons for harming yourself  · Remove any means that you might use to hurt yourself (examples: pills, rope, extension cords, firearm)  · Get professional help from the community (Mental Health, Substance Abuse, psychological counseling)  · Do not be alone:Call your Safe Contact- someone whom you trust who will be there for you.  · Call your local CRISIS HOTLINE 231-7509 or 752-464-7556  · Call your local Children's Mobile Crisis Response Team [place] (616) 534-0570 or www.[employer]  · Call the toll free National Suicide Prevention Hotlines   · National Suicide Prevention Lifeline 726-743-EVZA (8106)  · National Hope Line Network 800-SUICIDE (083-2699)    DISCHARGE SURVEY:  Thank you for choosing  [facility].  We hope we provided you with very good care.  You may be receiving a survey in the mail.  Please fill it out.  Your opinion is valuable to us.    ADDITIONAL EDUCATIONAL MATERIALS GIVEN TO PATIENT:        My signature on this form indicates that:  1.  I have reviewed and understand the above information  2.  My questions regarding this information have been answered to my satisfaction.  3.  I have formulated a plan with my discharge nurse to obtain my prescribed medication for home.

## 2018-11-12 ENCOUNTER — ROUTINE PRENATAL (OUTPATIENT)
Dept: OBGYN | Facility: MEDICAL CENTER | Age: 23
End: 2018-11-12
Payer: COMMERCIAL

## 2018-11-12 VITALS — BODY MASS INDEX: 45.71 KG/M2 | WEIGHT: 258 LBS | DIASTOLIC BLOOD PRESSURE: 88 MMHG | SYSTOLIC BLOOD PRESSURE: 145 MMHG

## 2018-11-12 DIAGNOSIS — Z98.891 S/P EMERGENCY CESAREAN SECTION: ICD-10-CM

## 2018-11-12 DIAGNOSIS — Z09 POSTOP CHECK: ICD-10-CM

## 2018-11-12 PROBLEM — Z3A.34 34 WEEKS GESTATION OF PREGNANCY: Status: ACTIVE | Noted: 2018-11-12

## 2018-11-12 LAB
BACTERIA SPEC ANAEROBE CULT: NORMAL
SIGNIFICANT IND 70042: NORMAL
SITE SITE: NORMAL
SOURCE SOURCE: NORMAL

## 2018-11-12 PROCEDURE — 90040 PR PRENATAL FOLLOW UP: CPT | Performed by: OBSTETRICS & GYNECOLOGY

## 2018-11-12 NOTE — PROGRESS NOTES
Post Op Incision check    Pt is 24 yo  now s/p PLTCS for severe preeclampsia at 34 weeks. States BP at home in the 130/80 range. Baby still in NICU and she is doing well.   Lactation is going well with breast feeding and pumping.     Vitals:    18 1530   BP: 145/88     Past Medical History:   Diagnosis Date   • Asthma    • Preeclampsia, severe, third trimester 2018     Past Surgical History:   Procedure Laterality Date   • PRIMARY C SECTION Bilateral 2018    Procedure: PRIMARY C SECTION;  Surgeon: Carlos Fong M.D.;  Location: LABOR AND DELIVERY;  Service: Obstetrics       PE  Gen: AAO in NAD  Chest: CTA BL, no wheezes  Heart: S1/2 without murmur.   Adomen: soft, obese, nontender to palpation. Prineo in place.   Extremities: trace edema bilaterally. nontender.      S/p PLTCS at 34wks for PEC with SF. Doing well. Incision well healed.\  Cont labetalol 400mg Q8hrs and nifedipine 60XL daily.   Postpartum PEC precautions discussed. May need long term BP control for chronic hypertension if still hypertensive after 6 week period.   Discussed birth control methods. Pt would like something long term Will order IUD for postpartum visit.   Pelvic rest   RTC in 4-5 weeks for full postpartum check.   Recommend home BP checks

## 2018-11-12 NOTE — PROGRESS NOTES
Pt presents for 1 week Post-op LTCS for severe PIH.Current rx labetolol 200mg 2 pills q 8. And Nifed 60mg CR.

## 2018-11-27 ENCOUNTER — ROUTINE PRENATAL (OUTPATIENT)
Dept: OBGYN | Facility: MEDICAL CENTER | Age: 23
End: 2018-11-27
Payer: COMMERCIAL

## 2018-11-27 VITALS
WEIGHT: 240.85 LBS | BODY MASS INDEX: 42.68 KG/M2 | DIASTOLIC BLOOD PRESSURE: 74 MMHG | SYSTOLIC BLOOD PRESSURE: 128 MMHG

## 2018-11-27 DIAGNOSIS — Z98.891 S/P CESAREAN SECTION: ICD-10-CM

## 2018-11-27 DIAGNOSIS — O14.93 PRE-ECLAMPSIA IN THIRD TRIMESTER: ICD-10-CM

## 2018-11-27 ASSESSMENT — EDINBURGH POSTNATAL DEPRESSION SCALE (EPDS)
THE THOUGHT OF HARMING MYSELF HAS OCCURRED TO ME: SOMETIMES
I HAVE BEEN ANXIOUS OR WORRIED FOR NO GOOD REASON: YES, SOMETIMES
I HAVE FELT SAD OR MISERABLE: NOT VERY OFTEN
I HAVE BEEN SO UNHAPPY THAT I HAVE BEEN CRYING: ONLY OCCASIONALLY
I HAVE LOOKED FORWARD WITH ENJOYMENT TO THINGS: AS MUCH AS I EVER DID
I HAVE BEEN SO UNHAPPY THAT I HAVE HAD DIFFICULTY SLEEPING: NOT AT ALL
THINGS HAVE BEEN GETTING ON TOP OF ME: YES, SOMETIMES I HAVEN'T BEEN COPING AS WELL AS USUAL
TOTAL SCORE: 11
I HAVE FELT SCARED OR PANICKY FOR NO GOOD REASON: NO, NOT MUCH
I HAVE BEEN ABLE TO LAUGH AND SEE THE FUNNY SIDE OF THINGS: AS MUCH AS I ALWAYS COULD
I HAVE BLAMED MYSELF UNNECESSARILY WHEN THINGS WENT WRONG: YES, SOME OF THE TIME

## 2018-11-27 NOTE — Clinical Note
Not sure how to bill this?  Pt had c/s 3wks ago, here for additional f/u for BP concerns related to pre-eclampsia and also with new onset postpartum depression.  Help!

## 2018-11-27 NOTE — PROGRESS NOTES
Pt here today for postpartum exam.  Operation Date: 11/07/18  Currently: breast and bottle feeding  Good ph: [phone]  Pt  interested in discussing BC  Pharmacy verified

## 2018-11-27 NOTE — PROGRESS NOTES
Post-Partum Visit    CC: post-partum    HPI: 23 y.o.  s/p  for  pre-eclampsia with severe features w/ altered mental status remote from delivery on 18 @ 34w6d.  No abnormal labs.  Pt discharged on labetalol 400mg TID and procardia XL 60mg daily.    Seen last week for incision/BP check and doing well.      Here today for additional f/u.      Today reports she is doing well.  Bps at home 130-140s/  Does reports sometimes when standing for a few minutes or in the shower feels lightheaded and needs to sit down which makes the feeling go away.  No syncope, no CP/SOB/palpitations.    Denies HA/RUQ pain/vision changes.  Minimal pain around incision.   Baby doing well, pumping and formula feeding.  Denies VB.  Plans mirena for contraception which has been ordered      EDPDS: 11 but does report some thoughts of self harm in the last week.  Denies having a plan to kill herself but has thought once or twice about taking all of her BP medications or not taking it at all.  Denies thoughts of hurting herself at this time.      Reports she feels seh has a hx of depression/anxiety but has never seen anyone for it.   No thoughts of harming baby  Does not hear or see things other people don't   Does not feel she is at risk of imminently harming herself.        ROS:  gen: denies general concerns, fevers  Neuro: denies HA/vision changes  abd: denies abd pain, GI concerns  : denies vaginal bleeding, discharge, pain    Past Medical History:   Diagnosis Date   • Asthma    • Preeclampsia, severe, third trimester 2018       Physical Exam:  /74   Wt 109.2 kg (240 lb 13.6 oz)   BMI 42.68 kg/m²   gen: AAO, NAD  CV: RRR  Resp: ctab  abd: soft, NT, ND, no masses   incision: well healed  Ext: NT, no edema    A/P: 23 y.o.  s/p c/s at 34wks for preE with severe features  Now with postpartum depression  Lightheadedness with standing or in the shower: although reports normal/mild range Bps with  current BP medication, concern that this could be related to her labetalol.  Discussed often need to taper down medications as get more remote from delivery.  Will decrease to labetalol 300mg TID and cont procardia 60XL daily for now.  Instructed to cont taking BP and to go back up if BP regularly >150 or diastolic 110.  Advised can always call if has questions about titrating her medication prior to f/u in 1 week.  Discussed ED precautions if with syncope, CP/SOB.      PP depression with thoughts of self harm.  No active suicidal ideation at this time.  Discussed importance of treatment for postpartum depression.  Pt amenable to both behavioral health referral and medication.  Will start zoloft 50mg daily x1 week then instructed to increase to 100mg daily.  Discussed may need higher dose than than and also discussed that medical effect would not be expected to take effect for a month or so.  Discussed some increased risk of SI in primarily younger pts and strictly instructed if with thoughts of harming self or baby can always call 911 in emergent situation.  Pt expressed understanding, does not feel she is at risk of hurting herself right now.  I spoke with behavioral health who will see pt tomorrow at 1400.  Pt given appt time and location and strictly instructed to go to appt which she says she will.    We also discussed the importance of adequate sleep (reprots mom is helping her at home with baby).      F/u 1 week for repeat BP check and mood check      Shelley Mitchell MD  [facility], Women's Health

## 2018-11-28 ENCOUNTER — OFFICE VISIT (OUTPATIENT)
Dept: BEHAVIORAL HEALTH | Facility: CLINIC | Age: 23
End: 2018-11-28
Payer: COMMERCIAL

## 2018-11-28 PROCEDURE — 90834 PSYTX W PT 45 MINUTES: CPT | Performed by: SOCIAL WORKER

## 2018-11-28 ASSESSMENT — PATIENT HEALTH QUESTIONNAIRE - PHQ9
CLINICAL INTERPRETATION OF PHQ2 SCORE: 4
SUM OF ALL RESPONSES TO PHQ QUESTIONS 1-9: 16
5. POOR APPETITE OR OVEREATING: 3 - NEARLY EVERY DAY
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
## 2018-12-04 ENCOUNTER — OFFICE VISIT (OUTPATIENT)
Dept: BEHAVIORAL HEALTH | Facility: CLINIC | Age: 23
End: 2018-12-04
Payer: COMMERCIAL

## 2018-12-04 PROCEDURE — 90834 PSYTX W PT 45 MINUTES: CPT | Performed by: SOCIAL WORKER

## 2018-12-04 NOTE — BH THERAPY
[facility]  Therapy Progress Note    Patient Name: Flori Car  Patient MRN: [id number]  Today's Date: 12/4/2018     Type of session:Individual psychotherapy  Length of session: 45 minutes  Persons in attendance:Patient    Subjective/New Info: Client reported she was feeling better. She had a good week, and noticed an improvement in communication with her boyfriend. She is still worried about her connection with her daughter. She reported she feels okay about herself, but also at times feels she is not doing a good job. Pscyhoeducation on matrescence. Ct reported she does better with writing. Sent her home with an article to read and take notes on for next session.     Objective/Observations:   Participation: Limited verbal participation and Guarded   Grooming: Good and Casual   Cognition: Alert and Fully Oriented   Eye contact: Good   Mood: Euthymic   Affect: Congruent with content   Thought process: Logical and Goal-directed   Speech: Rate within normal limits and Volume within normal limits   Other:     Diagnoses:   1. Postpartum depression associated with first pregnancy         Current risk:   SUICIDE: Low   Homicide: Low   Self-harm: Not applicable   Relapse: Not applicable   Other:    Safety Plan reviewed? Not Indicated   If evidence of imminent risk is present, intervention/plan:     Therapeutic Intervention(s): Establish rapport    Treatment Goal(s)/Objective(s) addressed: Develop in next sessoion     Progress toward Treatment Goals: Mild improvement    Plan:  - Continue Individual therapy  - Next appointment scheduled:  12/12/2018  - notes on article     Terri Shepherd L.C.S.W.  12/4/2018

## 2018-12-05 ENCOUNTER — POST PARTUM (OUTPATIENT)
Dept: OBGYN | Facility: MEDICAL CENTER | Age: 23
End: 2018-12-05
Payer: COMMERCIAL

## 2018-12-05 VITALS
DIASTOLIC BLOOD PRESSURE: 78 MMHG | WEIGHT: 242.51 LBS | BODY MASS INDEX: 42.97 KG/M2 | SYSTOLIC BLOOD PRESSURE: 138 MMHG

## 2018-12-05 DIAGNOSIS — Z01.30 BLOOD PRESSURE CHECK: ICD-10-CM

## 2018-12-05 PROCEDURE — 90040 PR PRENATAL FOLLOW UP: CPT | Performed by: OBSTETRICS & GYNECOLOGY

## 2018-12-05 NOTE — PROGRESS NOTES
Flori Car is a 23 y.o. female who presents for her Postpartum Exam      HPI: Pt presents for postpartum exam s/p  for PEC with SF on 2018. Patient is without complaints. Saw  on the  and also yesterday. Has another appt with them next week. Overall mood is doing well. Denies HA, BV, dizziness, etc.. States home Bps are 125-145 / .   Labetalol dose at 300mg TID. Still on Procardia 60mg XL daily.   Baby is currently at home and pt is breast feeding with pumping.   Was sexually active once and denies dyspareunia.   Lochia resolved.  Mirena ordered for contraception but not in yet.    Review of Systems:   Pertinent positives documented in HPI and all other systems reviewed & are negative    Last pap: 2018 NILM    Physical exam:   Vital measurements:  Blood pressure 138/78, weight 110 kg (242 lb 8.1 oz), not currently breastfeeding.  Body mass index is 42.97 kg/m². (Goal BMI>18 <25)  Nursing note and vitals reviewed.  Gen: She is oriented to person, place, and time. She appears well-developed and well-nourished. No distress.   Heart: Heart regular rate and rhythm  Lungs: clear to auscultation bilaterally  Breasts: nontender, not engorged, no dominant masses, nipples intact  Abdomen: soft, nontender, nondistended, no rebound/guarding  Extremities: nontender, no clubbing, cyanosis or edema    A/P: Postpartum status post  for PEC with severe features. Currently doing well.     1. Lactation:  Has an appt to see lactation connection in the near future.    2. PPdepression:   Currently on zoloft 50mg daily. Informed pt if mood still depressed then can increase to 100 daily.   Seeing behavioral health (last visit yesterday).   Reiterated precautions to dial 911 in emergency situation or proceed to the nearest ED if having SI/HI.     3. Postop :  F/u for postpartum visit. Mirena to be inserted at that visit.   2 weeks

## 2018-12-12 ENCOUNTER — OFFICE VISIT (OUTPATIENT)
Dept: BEHAVIORAL HEALTH | Facility: CLINIC | Age: 23
End: 2018-12-12
Payer: COMMERCIAL

## 2018-12-12 PROCEDURE — 90834 PSYTX W PT 45 MINUTES: CPT | Performed by: SOCIAL WORKER
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
## 2018-12-26 ENCOUNTER — POST PARTUM (OUTPATIENT)
Dept: OBGYN | Facility: MEDICAL CENTER | Age: 23
End: 2018-12-26
Payer: COMMERCIAL

## 2018-12-26 DIAGNOSIS — Z98.891 S/P CESAREAN SECTION: ICD-10-CM

## 2018-12-26 PROCEDURE — 90040 PR PRENATAL FOLLOW UP: CPT | Performed by: OBSTETRICS & GYNECOLOGY

## 2018-12-26 NOTE — PROGRESS NOTES
Pt presents for 6 week PP exam.Pt doing well, bottlefeeding, denies bleeding or pain.Pt is 6 wk PP LTCS 11/7/2018 Baby Girl Connie. Pt interested in mirena IUD.

## 2018-12-26 NOTE — PROGRESS NOTES
Flori Car is a 23 y.o. female who presents for her Postpartum Exam      HPI: Pt presents for postpartum exam s/p  for severe preeclampsia on 18. Patient is without complaints. Has initiated sexual activity wihtout issues. Baby doing well.  Bottle feeding. Baby Connie is home now and doing well.  Mood is stable. Still on Zoloft 50mg daily.   Denies HA, BV, RUQ pain. Still taking 60mg nifedipine and labetalol TID.   Lochia resolved.  Desires Mirena for contraception.    Review of Systems:   Pertinent positives documented in HPI and all other systems reviewed & are negative    Last pap: 2018    Physical exam:   Vital measurements:  not currently breastfeeding.  There is no height or weight on file to calculate BMI. (Goal BMI>18 <25)  Nursing note and vitals reviewed.  Gen: She is oriented to person, place, and time. She appears well-developed and well-nourished. No distress.   Heart: Heart regular rate and rhythm  Lungs: clear to auscultation bilaterally  Breasts: nontender, not engorged, no dominant masses, nipples intact  Abdomen: soft, nontender, nondistended, no rebound/guarding  Extremities: nontender, no clubbing, cyanosis or edema  Pelvic: Normal external female genitalia, well-healed perineum from delivery, cervix is normal, uterus approximately 8 weeks in size non tender, adnexa bilaterally normal with no dominant masses    A/P: Postpartum status post  for severe preeclampsia at 34 weeks.   Doing well without complaints  Continue prenatal vitamins if breast feeding  May resume normal activities including exercise, tampons, and intercourse  Discussed various methods of contraception with the patient including OCs, ring, patch, DepoProvera, IUD, Nexplanon, and tubal ligation. Also discussed barrier method and rhythm methods.   Contraception Mirena - signed for device today  Follow up in 3 weeks for Mirena.   BP still elevated today 130s/80s while on labetalol and nifedipine  still. Advised patient to make appt with PCP to rule out chronic hypertension at this time.

## 2019-01-02 ENCOUNTER — OFFICE VISIT (OUTPATIENT)
Dept: BEHAVIORAL HEALTH | Facility: CLINIC | Age: 24
End: 2019-01-02
Payer: COMMERCIAL

## 2019-01-02 ENCOUNTER — GYNECOLOGY VISIT (OUTPATIENT)
Dept: OBGYN | Facility: MEDICAL CENTER | Age: 24
End: 2019-01-02
Payer: COMMERCIAL

## 2019-01-02 VITALS
SYSTOLIC BLOOD PRESSURE: 120 MMHG | BODY MASS INDEX: 43.83 KG/M2 | DIASTOLIC BLOOD PRESSURE: 84 MMHG | WEIGHT: 247.36 LBS | HEIGHT: 63 IN

## 2019-01-02 DIAGNOSIS — Z30.430 ENCOUNTER FOR IUD INSERTION: ICD-10-CM

## 2019-01-02 LAB
INT CON NEG: NEGATIVE
INT CON POS: POSITIVE
POC URINE PREGNANCY TEST: NEGATIVE

## 2019-01-02 PROCEDURE — 90834 PSYTX W PT 45 MINUTES: CPT | Performed by: SOCIAL WORKER

## 2019-01-02 PROCEDURE — 81025 URINE PREGNANCY TEST: CPT | Performed by: OBSTETRICS & GYNECOLOGY

## 2019-01-02 PROCEDURE — 58300 INSERT INTRAUTERINE DEVICE: CPT | Performed by: OBSTETRICS & GYNECOLOGY

## 2019-01-02 NOTE — PROGRESS NOTES
23 y.o.    Female presents here today for her IUD insertion:     No LMP recorded.      Today the patient is counseled on the risks of IUD insertion. I also discussed with the patient the risk of infection on insertion, and had asked the patient to remain on pelvic rest for one week following the insertion. We also discussed the risk of IUD expulsion, the risk of uterine perforation and IUD migration. If the IUD does migrate the patient may require a separate procedure such as a laparoscopy to retrieve the migrated IUD. I also discussed the 1% risk of pregnancy with IUD use. Also discussed with patient today increased risk of ectopic pregnancy with IUD use. Discussed specific side effects of ParaGard IUD which can be increased vaginal bleeding during menses or increased dysmenorrhea. Also discussed today the possibility that IUD may need to be removed secondary to bleeding profile or pain. Also discussed were the possibility that partner can feel the IUD during intercourse. I also discussed the side effects of Mirena which can be amenorrhea or dysfunctional uterine bleeding or spotting.  Patient had the opportunity to ask questions regarding insertion, risks and benefits, all questions are answered in their entirety.  Informed consent is signed.    Procedure note  Urine pregnancy test is negative, informed consent was previously signed  The bimanual exam is performed the uterus is noted to be 8 weeks in size and is mid position  A speculum was inserted into the vagina, the cervix was cleansed with Betadine swabs x3  Tenaculum was placed on the anterior lip of the cervix  The uterus was sounded to a depth of 9 centimeters  The IUD is placed under sterile conditions, no complications  The strings trimmed to approximately 3 cm  Tenaculum was removed from the cervix and hemostasis was achieved with silver nitrate  The patient tolerated the procedure well    Patient is asked to followup in 4 to 6 weeks for IUD  check. The patient is asked to remain on pelvic rest for one week. She is asked to return to office sooner as needed for heavy vaginal bleeding, uncontrolled pain, fever, or any other concerns.

## 2019-01-03 NOTE — BH THERAPY
[facility]  Therapy Progress Note    Patient Name: Flori Car  Patient MRN: [id number]  Today's Date: 1/2/2019     Type of session:Individual psychotherapy  Length of session: 45 minutes  Persons in attendance:Patient    Subjective/New Info: Client reported she is feeling more stable. Her mod has improved, she feels better in her relationship, and she reported feeling more bonded to her baby. Discussed client's personalization of things, and began psychoeducation on self-compassion. Client agreed to work on step one (identify feeling), and step two (regognize feeling as a human emotion). She honestly disclosed she would struggle to offer herself compassion, and wouldn't even know where to start. Agreed to work on not being mean to herself to start. Discussed client's limited verbal participation, and she acknowledged that she is a very quiet person and her participation in session is still more than usual.     Objective/Observations:   Participation: Active verbal participation (fror her)   Grooming: Good and Casual   Cognition: Alert and Fully Oriented   Eye contact: Good   Mood: Euthymic   Affect: Flexible   Thought process: Logical and Goal-directed   Speech: Rate within normal limits and Volume within normal limits   Other:     Diagnoses:   1. Postpartum depression associated with first pregnancy         Current risk:   SUICIDE: Low   Homicide: Low   Self-harm: Not applicable   Relapse: Not applicable   Other:    Safety Plan reviewed? Not Indicated   If evidence of imminent risk is present, intervention/plan:     Therapeutic Intervention(s): Psychoeducation RE: self-compassion and Supportive psychotherapy    Treatment Goal(s)/Objective(s) addressed:   · Develop and utilize skills to manage mood and emotional suffering more effectively  · Learn to successfully challenge & change distortions in thinking  · Increase behaviors of self-compassion and self-care  · Identify and address issues  related to postpartum depression, including self-image, parenting, and relationship issues    Progress toward Treatment Goals: Mild improvement    Plan:  - Continue Individual therapy  - Next appointment scheduled:  1/22/2019  - Patient is in agreement with the above plan:  YES    Terri Shepherd L.C.S.W.  1/2/2019

## 2019-01-22 ENCOUNTER — OFFICE VISIT (OUTPATIENT)
Dept: BEHAVIORAL HEALTH | Facility: CLINIC | Age: 24
End: 2019-01-22
Payer: COMMERCIAL

## 2019-01-22 PROCEDURE — 90834 PSYTX W PT 45 MINUTES: CPT | Performed by: SOCIAL WORKER

## 2019-01-22 NOTE — BH THERAPY
" [facility]  Therapy Progress Note    Patient Name: Flori Car  Patient MRN: [id number]  Today's Date: 1/22/2019     Type of session:Individual psychotherapy  Length of session: 45 minutes  Persons in attendance:Patient    Subjective/New Info: Client reported she has had a rough few weeks. She and her partner have decided to separate for a while, possibly permanently. She stated this was generally mutual, but also felt like if she could, she would keep the relationship together. She reported having some anxiety about her daughter being in  as well. \"What if she forgets I am her mom?\" Normalized some of her worry. Engaged client in a mindfulness exercise about letting go of thoughts. She reported she struggled a little with it. Discussed letting go of thoughts vs changing thoughts. Client can recognize she has a lot of negative thinking, and wants to work on this. Sent client home with some thought logs to track her thoughts and feelings.     Objective/Observations:   Participation: Active verbal participation and Engaged   Grooming: Good and Casual   Cognition: Alert and Fully Oriented   Eye contact: Good   Mood: Depressed   Affect: Congruent with content and Sad   Thought process: Logical and Goal-directed   Speech: Rate within normal limits and Volume within normal limits   Other:     Diagnoses:   1. Postpartum depression associated with first pregnancy         Current risk:   SUICIDE: Low   Homicide: Low   Self-harm: Not applicable   Relapse: Not applicable   Other:    Safety Plan reviewed? Not Indicated   If evidence of imminent risk is present, intervention/plan:     Therapeutic Intervention(s): Supportive psychotherapy and Mindfulness exercise     Treatment Goal(s)/Objective(s) addressed:   · Develop and utilize skills to manage mood and emotional suffering more effectively  · Learn to successfully challenge & change distortions in thinking  · Increase behaviors of self-compassion " and self-care  · Identify and address issues related to postpartum depression, including self-image, parenting, and relationship issues       Progress toward Treatment Goals: Mild decline    Plan:  - Continue Individual therapy  - Next appointment scheduled:  2/5/2019  - Patient is in agreement with the above plan:  YES    Terri Shepherd L.C.S.W.  1/22/2019

## 2019-02-05 ENCOUNTER — OFFICE VISIT (OUTPATIENT)
Dept: BEHAVIORAL HEALTH | Facility: CLINIC | Age: 24
End: 2019-02-05
Payer: COMMERCIAL

## 2019-02-05 PROCEDURE — 90834 PSYTX W PT 45 MINUTES: CPT | Performed by: SOCIAL WORKER

## 2019-02-06 NOTE — BH THERAPY
[facility]  Therapy Progress Note    Patient Name: Flori Car  Patient MRN: [id number]  Today's Date: 2/5/2019     Type of session:Individual psychotherapy  Length of session: 45 minutes  Persons in attendance:Patient    Subjective/New Info: Client came in with her thinking logs. She stated she found them helpful, and would like to continue using them. Discussed process and content of logs. Client admitted to having once suicidal thought in the past two weeks, but denied intent, plan, or means. Discussed matrescence, and helped client start thinking about her ambivalence and feelings of inadequacy differently. Provided article for reference. Client denied current SI.     Objective/Observations:   Participation: Active verbal participation and Open to feedback   Grooming: Good and Casual   Cognition: Alert and Fully Oriented   Eye contact: Good   Mood: Depressed and Anxious   Affect: Congruent with content   Thought process: Logical and Goal-directed   Speech: Rate within normal limits and Soft   Other:     Diagnoses:   1. Postpartum depression associated with first pregnancy         Current risk:   SUICIDE: Low   Homicide: Low   Self-harm: Not applicable   Relapse: Not applicable   Other:    Safety Plan reviewed? Not Indicated   If evidence of imminent risk is present, intervention/plan:     Therapeutic Intervention(s): Clarify:  Clarify feelings and Clarify thoughts, Cognitive modification, Psychoeducation RE: matrescence and Supportive psychotherapy    Treatment Goal(s)/Objective(s) addressed:   · Develop and utilize skills to manage mood and emotional suffering more effectively  · Learn to successfully challenge & change distortions in thinking  · Increase behaviors of self-compassion and self-care  · Identify and address issues related to postpartum depression, including self-image, parenting, and relationship issues    Progress toward Treatment Goals: Mild improvement    Plan:  - Continue  Individual therapy  - Next appointment scheduled:  2/19/2019  - Patient is in agreement with the above plan:  YES    ILEANA Stewart.[email or web address]  2/5/2019

## 2019-02-14 ENCOUNTER — HOSPITAL ENCOUNTER (INPATIENT)
Facility: MEDICAL CENTER | Age: 24
LOS: 6 days | DRG: 918 | End: 2019-02-21
Attending: EMERGENCY MEDICINE | Admitting: INTERNAL MEDICINE
Payer: COMMERCIAL

## 2019-02-14 LAB
ALBUMIN SERPL BCP-MCNC: 4.2 G/DL (ref 3.2–4.9)
ALBUMIN/GLOB SERPL: 1.2 G/DL
ALP SERPL-CCNC: 72 U/L (ref 30–99)
ALT SERPL-CCNC: 29 U/L (ref 2–50)
ANION GAP SERPL CALC-SCNC: 10 MMOL/L (ref 0–11.9)
APAP SERPL-MCNC: <10 UG/ML (ref 10–30)
AST SERPL-CCNC: 23 U/L (ref 12–45)
BASOPHILS # BLD AUTO: 0.6 % (ref 0–1.8)
BASOPHILS # BLD: 0.07 K/UL (ref 0–0.12)
BILIRUB SERPL-MCNC: 0.3 MG/DL (ref 0.1–1.5)
BUN SERPL-MCNC: 17 MG/DL (ref 8–22)
CALCIUM SERPL-MCNC: 9 MG/DL (ref 8.5–10.5)
CHLORIDE SERPL-SCNC: 107 MMOL/L (ref 96–112)
CO2 SERPL-SCNC: 21 MMOL/L (ref 20–33)
CREAT SERPL-MCNC: 0.84 MG/DL (ref 0.5–1.4)
EKG IMPRESSION: NORMAL
EOSINOPHIL # BLD AUTO: 0.39 K/UL (ref 0–0.51)
EOSINOPHIL NFR BLD: 3.5 % (ref 0–6.9)
ERYTHROCYTE [DISTWIDTH] IN BLOOD BY AUTOMATED COUNT: 41.7 FL (ref 35.9–50)
ETHANOL BLD-MCNC: 0.01 G/DL
GLOBULIN SER CALC-MCNC: 3.4 G/DL (ref 1.9–3.5)
GLUCOSE SERPL-MCNC: 112 MG/DL (ref 65–99)
HCT VFR BLD AUTO: 44.3 % (ref 37–47)
HGB BLD-MCNC: 13.9 G/DL (ref 12–16)
IMM GRANULOCYTES # BLD AUTO: 0.09 K/UL (ref 0–0.11)
IMM GRANULOCYTES NFR BLD AUTO: 0.8 % (ref 0–0.9)
LYMPHOCYTES # BLD AUTO: 3.9 K/UL (ref 1–4.8)
LYMPHOCYTES NFR BLD: 35.2 % (ref 22–41)
MCH RBC QN AUTO: 26.9 PG (ref 27–33)
MCHC RBC AUTO-ENTMCNC: 31.4 G/DL (ref 33.6–35)
MCV RBC AUTO: 85.9 FL (ref 81.4–97.8)
MONOCYTES # BLD AUTO: 0.71 K/UL (ref 0–0.85)
MONOCYTES NFR BLD AUTO: 6.4 % (ref 0–13.4)
NEUTROPHILS # BLD AUTO: 5.93 K/UL (ref 2–7.15)
NEUTROPHILS NFR BLD: 53.5 % (ref 44–72)
NRBC # BLD AUTO: 0 K/UL
NRBC BLD-RTO: 0 /100 WBC
PLATELET # BLD AUTO: 380 K/UL (ref 164–446)
PMV BLD AUTO: 9.1 FL (ref 9–12.9)
POTASSIUM SERPL-SCNC: 3.2 MMOL/L (ref 3.6–5.5)
PROT SERPL-MCNC: 7.6 G/DL (ref 6–8.2)
RBC # BLD AUTO: 5.16 M/UL (ref 4.2–5.4)
SALICYLATES SERPL-MCNC: 0 MG/DL (ref 15–25)
SODIUM SERPL-SCNC: 138 MMOL/L (ref 135–145)
WBC # BLD AUTO: 11.1 K/UL (ref 4.8–10.8)

## 2019-02-14 PROCEDURE — 36556 INSERT NON-TUNNEL CV CATH: CPT

## 2019-02-14 PROCEDURE — C1751 CATH, INF, PER/CENT/MIDLINE: HCPCS

## 2019-02-14 PROCEDURE — A9270 NON-COVERED ITEM OR SERVICE: HCPCS | Performed by: EMERGENCY MEDICINE

## 2019-02-14 PROCEDURE — 96365 THER/PROPH/DIAG IV INF INIT: CPT

## 2019-02-14 PROCEDURE — 80307 DRUG TEST PRSMV CHEM ANLYZR: CPT

## 2019-02-14 PROCEDURE — 93005 ELECTROCARDIOGRAM TRACING: CPT | Performed by: EMERGENCY MEDICINE

## 2019-02-14 PROCEDURE — 80053 COMPREHEN METABOLIC PANEL: CPT

## 2019-02-14 PROCEDURE — 85025 COMPLETE CBC W/AUTO DIFF WBC: CPT

## 2019-02-14 PROCEDURE — 99291 CRITICAL CARE FIRST HOUR: CPT

## 2019-02-14 PROCEDURE — 700111 HCHG RX REV CODE 636 W/ 250 OVERRIDE (IP): Performed by: EMERGENCY MEDICINE

## 2019-02-14 PROCEDURE — 700105 HCHG RX REV CODE 258: Performed by: EMERGENCY MEDICINE

## 2019-02-14 PROCEDURE — 36415 COLL VENOUS BLD VENIPUNCTURE: CPT

## 2019-02-14 PROCEDURE — 93005 ELECTROCARDIOGRAM TRACING: CPT

## 2019-02-14 PROCEDURE — 96375 TX/PRO/DX INJ NEW DRUG ADDON: CPT

## 2019-02-14 PROCEDURE — 700102 HCHG RX REV CODE 250 W/ 637 OVERRIDE(OP): Performed by: EMERGENCY MEDICINE

## 2019-02-14 RX ORDER — SODIUM CHLORIDE 9 MG/ML
2000 INJECTION, SOLUTION INTRAVENOUS ONCE
Status: COMPLETED | OUTPATIENT
Start: 2019-02-14 | End: 2019-02-14

## 2019-02-14 RX ORDER — LIDOCAINE HYDROCHLORIDE 10 MG/ML
20 INJECTION, SOLUTION INFILTRATION; PERINEURAL ONCE
Status: COMPLETED | OUTPATIENT
Start: 2019-02-15 | End: 2019-02-15

## 2019-02-14 RX ORDER — CALCIUM GLUCONATE 94 MG/ML
1 INJECTION, SOLUTION INTRAVENOUS ONCE
Status: DISCONTINUED | OUTPATIENT
Start: 2019-02-15 | End: 2019-02-14

## 2019-02-14 RX ADMIN — SODIUM CHLORIDE 2000 ML: 9 INJECTION, SOLUTION INTRAVENOUS at 23:10

## 2019-02-14 RX ADMIN — ACTIVATED CHARCOAL 50 G: 25 PELLET ORAL at 23:51

## 2019-02-14 RX ADMIN — CALCIUM GLUCONATE 3 G: 98 INJECTION, SOLUTION INTRAVENOUS at 23:58

## 2019-02-14 RX ADMIN — GLUCAGON HYDROCHLORIDE 1 MG: 1 INJECTION, POWDER, FOR SOLUTION INTRAMUSCULAR; INTRAVENOUS; SUBCUTANEOUS at 23:40

## 2019-02-15 ENCOUNTER — APPOINTMENT (OUTPATIENT)
Dept: OBGYN | Facility: MEDICAL CENTER | Age: 24
End: 2019-02-15

## 2019-02-15 ENCOUNTER — APPOINTMENT (OUTPATIENT)
Dept: RADIOLOGY | Facility: MEDICAL CENTER | Age: 24
DRG: 918 | End: 2019-02-15
Attending: EMERGENCY MEDICINE
Payer: COMMERCIAL

## 2019-02-15 PROBLEM — I10 HTN (HYPERTENSION): Status: ACTIVE | Noted: 2019-02-15

## 2019-02-15 PROBLEM — E87.6 HYPOKALEMIA: Status: ACTIVE | Noted: 2019-02-15

## 2019-02-15 PROBLEM — T14.91XA SUICIDE ATTEMPT (HCC): Status: ACTIVE | Noted: 2019-02-15

## 2019-02-15 PROBLEM — T46.1X1A CALCIUM CHANNEL BLOCKER OVERDOSE: Status: ACTIVE | Noted: 2019-02-15

## 2019-02-15 LAB
AMPHET UR QL SCN: NEGATIVE
ANION GAP SERPL CALC-SCNC: 8 MMOL/L (ref 0–11.9)
BARBITURATES UR QL SCN: NEGATIVE
BASOPHILS # BLD AUTO: 0.5 % (ref 0–1.8)
BASOPHILS # BLD: 0.07 K/UL (ref 0–0.12)
BENZODIAZ UR QL SCN: NEGATIVE
BUN SERPL-MCNC: 14 MG/DL (ref 8–22)
BZE UR QL SCN: NEGATIVE
CALCIUM SERPL-MCNC: 9.3 MG/DL (ref 8.5–10.5)
CANNABINOIDS UR QL SCN: NEGATIVE
CHLORIDE SERPL-SCNC: 107 MMOL/L (ref 96–112)
CO2 SERPL-SCNC: 20 MMOL/L (ref 20–33)
CREAT SERPL-MCNC: 0.66 MG/DL (ref 0.5–1.4)
EKG IMPRESSION: NORMAL
EKG IMPRESSION: NORMAL
EOSINOPHIL # BLD AUTO: 0.3 K/UL (ref 0–0.51)
EOSINOPHIL NFR BLD: 2.2 % (ref 0–6.9)
ERYTHROCYTE [DISTWIDTH] IN BLOOD BY AUTOMATED COUNT: 41.1 FL (ref 35.9–50)
GLUCOSE SERPL-MCNC: 104 MG/DL (ref 65–99)
HCT VFR BLD AUTO: 40.7 % (ref 37–47)
HGB BLD-MCNC: 13 G/DL (ref 12–16)
IMM GRANULOCYTES # BLD AUTO: 0.07 K/UL (ref 0–0.11)
IMM GRANULOCYTES NFR BLD AUTO: 0.5 % (ref 0–0.9)
LYMPHOCYTES # BLD AUTO: 3.1 K/UL (ref 1–4.8)
LYMPHOCYTES NFR BLD: 22.5 % (ref 22–41)
MAGNESIUM SERPL-MCNC: 1.5 MG/DL (ref 1.5–2.5)
MCH RBC QN AUTO: 27.2 PG (ref 27–33)
MCHC RBC AUTO-ENTMCNC: 31.9 G/DL (ref 33.6–35)
MCV RBC AUTO: 85.1 FL (ref 81.4–97.8)
METHADONE UR QL SCN: NEGATIVE
MONOCYTES # BLD AUTO: 0.59 K/UL (ref 0–0.85)
MONOCYTES NFR BLD AUTO: 4.3 % (ref 0–13.4)
NEUTROPHILS # BLD AUTO: 9.66 K/UL (ref 2–7.15)
NEUTROPHILS NFR BLD: 70 % (ref 44–72)
NRBC # BLD AUTO: 0 K/UL
NRBC BLD-RTO: 0 /100 WBC
OPIATES UR QL SCN: NEGATIVE
OXYCODONE UR QL SCN: NEGATIVE
PCP UR QL SCN: NEGATIVE
PHOSPHATE SERPL-MCNC: 4.3 MG/DL (ref 2.5–4.5)
PLATELET # BLD AUTO: 331 K/UL (ref 164–446)
PMV BLD AUTO: 9.1 FL (ref 9–12.9)
POTASSIUM SERPL-SCNC: 3.8 MMOL/L (ref 3.6–5.5)
PROPOXYPH UR QL SCN: NEGATIVE
RBC # BLD AUTO: 4.78 M/UL (ref 4.2–5.4)
SODIUM SERPL-SCNC: 135 MMOL/L (ref 135–145)
WBC # BLD AUTO: 13.8 K/UL (ref 4.8–10.8)

## 2019-02-15 PROCEDURE — 83735 ASSAY OF MAGNESIUM: CPT

## 2019-02-15 PROCEDURE — C1751 CATH, INF, PER/CENT/MIDLINE: HCPCS | Performed by: EMERGENCY MEDICINE

## 2019-02-15 PROCEDURE — 700102 HCHG RX REV CODE 250 W/ 637 OVERRIDE(OP): Performed by: INTERNAL MEDICINE

## 2019-02-15 PROCEDURE — 99253 IP/OBS CNSLTJ NEW/EST LOW 45: CPT | Performed by: PSYCHIATRY & NEUROLOGY

## 2019-02-15 PROCEDURE — 99291 CRITICAL CARE FIRST HOUR: CPT | Performed by: INTERNAL MEDICINE

## 2019-02-15 PROCEDURE — 80048 BASIC METABOLIC PNL TOTAL CA: CPT

## 2019-02-15 PROCEDURE — 71045 X-RAY EXAM CHEST 1 VIEW: CPT

## 2019-02-15 PROCEDURE — 02HV33Z INSERTION OF INFUSION DEVICE INTO SUPERIOR VENA CAVA, PERCUTANEOUS APPROACH: ICD-10-PCS | Performed by: EMERGENCY MEDICINE

## 2019-02-15 PROCEDURE — 93005 ELECTROCARDIOGRAM TRACING: CPT | Performed by: INTERNAL MEDICINE

## 2019-02-15 PROCEDURE — B548ZZA ULTRASONOGRAPHY OF SUPERIOR VENA CAVA, GUIDANCE: ICD-10-PCS | Performed by: EMERGENCY MEDICINE

## 2019-02-15 PROCEDURE — 700111 HCHG RX REV CODE 636 W/ 250 OVERRIDE (IP): Performed by: INTERNAL MEDICINE

## 2019-02-15 PROCEDURE — 93010 ELECTROCARDIOGRAM REPORT: CPT | Performed by: INTERNAL MEDICINE

## 2019-02-15 PROCEDURE — 770022 HCHG ROOM/CARE - ICU (200)

## 2019-02-15 PROCEDURE — A9270 NON-COVERED ITEM OR SERVICE: HCPCS | Performed by: INTERNAL MEDICINE

## 2019-02-15 PROCEDURE — 84100 ASSAY OF PHOSPHORUS: CPT

## 2019-02-15 PROCEDURE — 85025 COMPLETE CBC W/AUTO DIFF WBC: CPT

## 2019-02-15 PROCEDURE — 700101 HCHG RX REV CODE 250: Performed by: EMERGENCY MEDICINE

## 2019-02-15 PROCEDURE — 93010 ELECTROCARDIOGRAM REPORT: CPT | Mod: 77 | Performed by: INTERNAL MEDICINE

## 2019-02-15 RX ORDER — MAGNESIUM SULFATE HEPTAHYDRATE 40 MG/ML
2 INJECTION, SOLUTION INTRAVENOUS EVERY 6 HOURS
Status: COMPLETED | OUTPATIENT
Start: 2019-02-15 | End: 2019-02-15

## 2019-02-15 RX ORDER — AMOXICILLIN 250 MG
2 CAPSULE ORAL 2 TIMES DAILY
Status: DISCONTINUED | OUTPATIENT
Start: 2019-02-15 | End: 2019-02-15

## 2019-02-15 RX ORDER — VITAMIN A ACETATE, BETA CAROTENE, ASCORBIC ACID, CHOLECALCIFEROL, .ALPHA.-TOCOPHEROL ACETATE, DL-, THIAMINE MONONITRATE, RIBOFLAVIN, NIACINAMIDE, PYRIDOXINE HYDROCHLORIDE, FOLIC ACID, CYANOCOBALAMIN, CALCIUM CARBONATE, FERROUS FUMARATE, ZINC OXIDE, CUPRIC OXIDE 3080; 12; 120; 400; 1; 1.84; 3; 20; 22; 920; 25; 200; 27; 10; 2 [IU]/1; UG/1; MG/1; [IU]/1; MG/1; MG/1; MG/1; MG/1; MG/1; [IU]/1; MG/1; MG/1; MG/1; MG/1; MG/1
1 TABLET, FILM COATED ORAL EVERY MORNING
Status: DISCONTINUED | OUTPATIENT
Start: 2019-02-15 | End: 2019-02-21 | Stop reason: HOSPADM

## 2019-02-15 RX ORDER — POLYETHYLENE GLYCOL 3350 17 G/17G
1 POWDER, FOR SOLUTION ORAL
Status: DISCONTINUED | OUTPATIENT
Start: 2019-02-15 | End: 2019-02-15

## 2019-02-15 RX ORDER — BISACODYL 10 MG
10 SUPPOSITORY, RECTAL RECTAL
Status: DISCONTINUED | OUTPATIENT
Start: 2019-02-15 | End: 2019-02-15

## 2019-02-15 RX ORDER — POTASSIUM CHLORIDE 29.8 MG/ML
40 INJECTION INTRAVENOUS ONCE
Status: COMPLETED | OUTPATIENT
Start: 2019-02-15 | End: 2019-02-15

## 2019-02-15 RX ORDER — POTASSIUM CHLORIDE 7.45 MG/ML
10 INJECTION INTRAVENOUS
Status: DISCONTINUED | OUTPATIENT
Start: 2019-02-15 | End: 2019-02-15

## 2019-02-15 RX ORDER — ACETAMINOPHEN 325 MG/1
650 TABLET ORAL EVERY 6 HOURS PRN
Status: DISCONTINUED | OUTPATIENT
Start: 2019-02-15 | End: 2019-02-21 | Stop reason: HOSPADM

## 2019-02-15 RX ORDER — FLUTICASONE PROPIONATE 50 MCG
1 SPRAY, SUSPENSION (ML) NASAL DAILY
Status: DISCONTINUED | OUTPATIENT
Start: 2019-02-15 | End: 2019-02-21 | Stop reason: HOSPADM

## 2019-02-15 RX ADMIN — MAGNESIUM SULFATE 2 G: 2 INJECTION INTRAVENOUS at 05:59

## 2019-02-15 RX ADMIN — MAGNESIUM SULFATE 2 G: 2 INJECTION INTRAVENOUS at 12:27

## 2019-02-15 RX ADMIN — POTASSIUM CHLORIDE 40 MEQ: 29.8 INJECTION, SOLUTION INTRAVENOUS at 02:39

## 2019-02-15 RX ADMIN — SERTRALINE 100 MG: 50 TABLET, FILM COATED ORAL at 05:06

## 2019-02-15 RX ADMIN — LIDOCAINE HYDROCHLORIDE 20 ML: 10 INJECTION, SOLUTION INFILTRATION; PERINEURAL at 00:18

## 2019-02-15 ASSESSMENT — COGNITIVE AND FUNCTIONAL STATUS - GENERAL
MOBILITY SCORE: 24
SUGGESTED CMS G CODE MODIFIER DAILY ACTIVITY: CH
SUGGESTED CMS G CODE MODIFIER MOBILITY: CH
DAILY ACTIVITIY SCORE: 24

## 2019-02-15 ASSESSMENT — LIFESTYLE VARIABLES
HOW MANY TIMES IN THE PAST YEAR HAVE YOU HAD 5 OR MORE DRINKS IN A DAY: 1
AVERAGE NUMBER OF DAYS PER WEEK YOU HAVE A DRINK CONTAINING ALCOHOL: 3
HAVE PEOPLE ANNOYED YOU BY CRITICIZING YOUR DRINKING: NO
EVER_SMOKED: NEVER
SUBSTANCE_ABUSE: 0
HAVE YOU EVER FELT YOU SHOULD CUT DOWN ON YOUR DRINKING: NO
TOTAL SCORE: 0
CONSUMPTION TOTAL: POSITIVE
EVER FELT BAD OR GUILTY ABOUT YOUR DRINKING: NO
ALCOHOL_USE: YES
EVER HAD A DRINK FIRST THING IN THE MORNING TO STEADY YOUR NERVES TO GET RID OF A HANGOVER: NO
TOTAL SCORE: 0
ON A TYPICAL DAY WHEN YOU DRINK ALCOHOL HOW MANY DRINKS DO YOU HAVE: 4
TOTAL SCORE: 0

## 2019-02-15 ASSESSMENT — ENCOUNTER SYMPTOMS
WHEEZING: 0
PHOTOPHOBIA: 0
INSOMNIA: 0
COUGH: 0
BLURRED VISION: 0
BRUISES/BLEEDS EASILY: 0
FEVER: 0
BACK PAIN: 0
DOUBLE VISION: 0
PND: 0
TINGLING: 0
VOMITING: 0
SENSORY CHANGE: 0
SORE THROAT: 0
HALLUCINATIONS: 0
SHORTNESS OF BREATH: 0
WEAKNESS: 0
NAUSEA: 0
FOCAL WEAKNESS: 0
DIARRHEA: 0
CHILLS: 0
DEPRESSION: 1
NAUSEA: 1
CLAUDICATION: 0
CONSTIPATION: 0
NERVOUS/ANXIOUS: 0
STRIDOR: 0
SPUTUM PRODUCTION: 0
HEADACHES: 0
PALPITATIONS: 0
ABDOMINAL PAIN: 0
MYALGIAS: 0
MEMORY LOSS: 0
BLOOD IN STOOL: 0
ORTHOPNEA: 0
SEIZURES: 0
WEIGHT LOSS: 0
DIZZINESS: 0
NECK PAIN: 0
LOSS OF CONSCIOUSNESS: 0

## 2019-02-15 ASSESSMENT — COPD QUESTIONNAIRES
DURING THE PAST 4 WEEKS HOW MUCH DID YOU FEEL SHORT OF BREATH: NONE/LITTLE OF THE TIME
DO YOU EVER COUGH UP ANY MUCUS OR PHLEGM?: NO/ONLY WITH OCCASIONAL COLDS OR INFECTIONS
HAVE YOU SMOKED AT LEAST 100 CIGARETTES IN YOUR ENTIRE LIFE: NO/DON'T KNOW
IN THE PAST 12 MONTHS DO YOU DO LESS THAN YOU USED TO BECAUSE OF YOUR BREATHING PROBLEMS: DISAGREE/UNSURE

## 2019-02-15 ASSESSMENT — PATIENT HEALTH QUESTIONNAIRE - PHQ9
1. LITTLE INTEREST OR PLEASURE IN DOING THINGS: SEVERAL DAYS
4. FEELING TIRED OR HAVING LITTLE ENERGY: NOT AT ALL
SUM OF ALL RESPONSES TO PHQ9 QUESTIONS 1 AND 2: 3
5. POOR APPETITE OR OVEREATING: NOT AT ALL
3. TROUBLE FALLING OR STAYING ASLEEP OR SLEEPING TOO MUCH: NOT AT ALL
8. MOVING OR SPEAKING SO SLOWLY THAT OTHER PEOPLE COULD HAVE NOTICED. OR THE OPPOSITE, BEING SO FIGETY OR RESTLESS THAT YOU HAVE BEEN MOVING AROUND A LOT MORE THAN USUAL: NOT AT ALL
SUM OF ALL RESPONSES TO PHQ QUESTIONS 1-9: 5
2. FEELING DOWN, DEPRESSED, IRRITABLE, OR HOPELESS: MORE THAN HALF THE DAYS
6. FEELING BAD ABOUT YOURSELF - OR THAT YOU ARE A FAILURE OR HAVE LET YOURSELF OR YOUR FAMILY DOWN: SEVERAL DAYS
7. TROUBLE CONCENTRATING ON THINGS, SUCH AS READING THE NEWSPAPER OR WATCHING TELEVISION: NOT AT ALL
9. THOUGHTS THAT YOU WOULD BE BETTER OFF DEAD, OR OF HURTING YOURSELF: SEVERAL DAYS

## 2019-02-15 NOTE — CONSULTS
Critical Care Consultation    Date of consult: 2/15/2019    Referring Physician  Zeke Lee M.D.    Reason for Consultation  Calcium channel blocker overdose    History of Presenting Illness  23 y.o. female with a past medical history of postpartum depression and asthma who presented 2/14/2019 after a suicide attempt.  She reports that she took 20-30 extended release nifedipine 60 mg tablets at 9 PM in an attempt to kill herself.  The patient had a baby in November of last year and was subsequently diagnosed with postpartum depression, she takes Zoloft and sees a therapist for this.  She has recently been feeling overwhelmed by the stresses of mother, feelings of inadequacy and lack of support from her significant other.  In the ER the patient was initially tachycardic and normotensive however became hypotensive during her stay in the ER and quickly rebounded with fluid resuscitation.  She was given 3 g of calcium gluconate in the ER and a central line has been placed the patient is to be admitted to the ICU for further monitoring in the setting of a long-acting channel blocker overdose.  At this time the patient has no complaints.    Code Status  Prior    Review of Systems  Review of Systems   Constitutional: Negative for chills and fever.   Respiratory: Negative for cough, sputum production, shortness of breath and stridor.    Cardiovascular: Negative for chest pain.   Gastrointestinal: Negative for abdominal pain, nausea and vomiting.   Genitourinary: Negative for dysuria.   Musculoskeletal: Negative for myalgias.   Skin: Negative for rash.   Neurological: Negative for dizziness, sensory change and focal weakness.   Psychiatric/Behavioral: Positive for depression and suicidal ideas.       Past Medical History   has a past medical history of Asthma; Postpartum depression, postpartum condition (12/5/2018); and Preeclampsia, severe, third trimester (11/11/2018). She also has no past medical history of  Addisons disease (HCC); Adrenal disorder (HCC); Allergy; Anemia; Anxiety; Arrhythmia; Arthritis; Blood transfusion without reported diagnosis; Cancer (HCC); Cataract; CHF (congestive heart failure) (HCC); Clotting disorder (HCC); COPD (chronic obstructive pulmonary disease) (HCC); Cushings syndrome (HCC); Diabetes (HCC); Diabetic neuropathy (HCC); GERD (gastroesophageal reflux disease); Glaucoma; Goiter; Head ache; Heart attack (HCC); Heart murmur; HIV (human immunodeficiency virus infection) (HCC); Hyperlipidemia; IBD (inflammatory bowel disease); Kidney disease; Meningitis; Migraine; Muscle disorder; Osteoporosis; Parathyroid disorder (HCC); Pituitary disease (HCC); Pulmonary emphysema (HCC); Seizure (HCC); Sickle cell disease (HCC); Stroke (HCC); Substance abuse (HCC); Thyroid disease; Tuberculosis; or Urinary tract infection.    Surgical History   has a past surgical history that includes primary c section (Bilateral, 11/7/2018).    Family History  family history includes Anemia in her maternal aunt; Cancer in her maternal aunt, maternal grandmother, and mother; Depression in her father and mother; Drug abuse in her father and mother; Hypertension in her maternal aunt; Obesity in her father, maternal grandmother, and mother. She was adopted.    Social History   reports that she has never smoked. She has never used smokeless tobacco. She reports that she does not drink alcohol or use drugs.    Medications  Home Medications    **Home medications have not yet been reviewed for this encounter**       Current Facility-Administered Medications   Medication Dose Route Frequency Provider Last Rate Last Dose   • calcium GLUConate 3 g in D5W 150 mL IVPB  3 g Intravenous Once Zeke Lee M.D. 100 mL/hr at 02/14/19 2358 3 g at 02/14/19 2358     Current Outpatient Prescriptions   Medication Sig Dispense Refill   • sertraline (ZOLOFT) 50 MG Tab 1 tab by mouth daily for 1 week then increase to 2 tabs daily. 60 Tab 2   •  labetalol (NORMODYNE) 200 MG Tab Take 2 Tabs by mouth every 8 hours. 90 Tab 3   • docusate sodium 100 MG Cap Take 100 mg by mouth 2 times a day as needed for Constipation. 60 Cap 1   • NIFEdipine (ADALAT CC) 60 MG CR tablet Take 1 Tab by mouth every day. 60 Tab 3   • Prenatal Vit-Fe Fumarate-FA (PRENATAL 1+1 PO) Take  by mouth.     • fluticasone (FLONASE) 50 MCG/ACT nasal spray Spray 1 Spray in nose every day.         Allergies  No Known Allergies    Vital Signs last 24 hours  Temp:  [37 °C (98.6 °F)] 37 °C (98.6 °F)  Pulse:  [117-145] 117  Resp:  [22] 22  BP: (115)/(58) 115/58  SpO2:  [97 %-100 %] 98 %    Physical Exam  Physical Exam   Constitutional: She is oriented to person, place, and time. She appears well-developed and well-nourished.   HENT:   Head: Normocephalic and atraumatic.   Right Ear: External ear normal.   Left Ear: External ear normal.   Nose: Nose normal.   Eyes: Pupils are equal, round, and reactive to light. Conjunctivae are normal.   Neck: Neck supple. No JVD present. No tracheal deviation present.   Cardiovascular: Normal rate, regular rhythm and intact distal pulses.    Pulmonary/Chest: Breath sounds normal. No accessory muscle usage. No respiratory distress.   Abdominal: Soft. Bowel sounds are normal. She exhibits no distension. There is no tenderness.   Musculoskeletal: Normal range of motion. She exhibits no tenderness or deformity.   Neurological: She is alert and oriented to person, place, and time. She exhibits normal muscle tone. Coordination normal.   Skin: Skin is warm and dry. No rash noted.   Psychiatric: She is withdrawn. She exhibits a depressed mood. She expresses suicidal ideation. She expresses suicidal plans.   Nursing note and vitals reviewed.      Fluids    Intake/Output Summary (Last 24 hours) at 02/15/19 0106  Last data filed at 02/14/19 2351   Gross per 24 hour   Intake             2000 ml   Output                0 ml   Net             2000 ml       Laboratory  Recent  Results (from the past 48 hour(s))   EKG    Collection Time: 19 10:50 PM   Result Value Ref Range    Report       [facility] Emergency Dept.    Test Date:  2019  Pt Name:    JARETH LUO                Department: ER  MRN:        [id number]                      Room:  Gender:     Female                       Technician: [id number]  :        1995                   Requested By:ER TRIAGE PROTOCOL  Order #:    [id number]                    Reading MD: GRETA GONZALES MD    Measurements  Intervals                                Axis  Rate:       124                          P:          48  ND:         115                          QRS:        29  QRSD:       100                          T:          -18  QT:         300  QTc:        431    Interpretive Statements  SINUS TACHYCARDIA  INFERIOR Q WAVES, PROBABLY NORMAL VARIATION  BORDERLINE REPOLARIZATION ABNORMALITY  ARTIFACT IN LEAD(S) II,III,aVR,aVL,aVF,V2  No previous ECG available for comparison    Electronically Signed On 2019 23:09:29 PST by GRETA GONZALES MD     DIAGNOSTIC ALCOHOL    Collection Time: 19 11:00 PM   Result Value Ref Range    Diagnostic Alcohol 0.01 (H) 0.00 g/dL   CBC WITH DIFFERENTIAL    Collection Time: 19 11:00 PM   Result Value Ref Range    WBC 11.1 (H) 4.8 - 10.8 K/uL    RBC 5.16 4.20 - 5.40 M/uL    Hemoglobin 13.9 12.0 - 16.0 g/dL    Hematocrit 44.3 37.0 - 47.0 %    MCV 85.9 81.4 - 97.8 fL    MCH 26.9 (L) 27.0 - 33.0 pg    MCHC 31.4 (L) 33.6 - 35.0 g/dL    RDW 41.7 35.9 - 50.0 fL    Platelet Count 380 164 - 446 K/uL    MPV 9.1 9.0 - 12.9 fL    Neutrophils-Polys 53.50 44.00 - 72.00 %    Lymphocytes 35.20 22.00 - 41.00 %    Monocytes 6.40 0.00 - 13.40 %    Eosinophils 3.50 0.00 - 6.90 %    Basophils 0.60 0.00 - 1.80 %    Immature Granulocytes 0.80 0.00 - 0.90 %    Nucleated RBC 0.00 /100 WBC    Neutrophils (Absolute) 5.93 2.00 - 7.15 K/uL    Lymphs (Absolute) 3.90 1.00 - 4.80 K/uL    Monos  (Absolute) 0.71 0.00 - 0.85 K/uL    Eos (Absolute) 0.39 0.00 - 0.51 K/uL    Baso (Absolute) 0.07 0.00 - 0.12 K/uL    Immature Granulocytes (abs) 0.09 0.00 - 0.11 K/uL    NRBC (Absolute) 0.00 K/uL   COMP METABOLIC PANEL    Collection Time: 02/14/19 11:00 PM   Result Value Ref Range    Sodium 138 135 - 145 mmol/L    Potassium 3.2 (L) 3.6 - 5.5 mmol/L    Chloride 107 96 - 112 mmol/L    Co2 21 20 - 33 mmol/L    Anion Gap 10.0 0.0 - 11.9    Glucose 112 (H) 65 - 99 mg/dL    Bun 17 8 - 22 mg/dL    Creatinine 0.84 0.50 - 1.40 mg/dL    Calcium 9.0 8.5 - 10.5 mg/dL    AST(SGOT) 23 12 - 45 U/L    ALT(SGPT) 29 2 - 50 U/L    Alkaline Phosphatase 72 30 - 99 U/L    Total Bilirubin 0.3 0.1 - 1.5 mg/dL    Albumin 4.2 3.2 - 4.9 g/dL    Total Protein 7.6 6.0 - 8.2 g/dL    Globulin 3.4 1.9 - 3.5 g/dL    A-G Ratio 1.2 g/dL   ACETAMINOPHEN    Collection Time: 02/14/19 11:00 PM   Result Value Ref Range    Acetaminophen -Tylenol <10 10 - 30 ug/mL   Salicylate    Collection Time: 02/14/19 11:00 PM   Result Value Ref Range    Salicylates, Quant. 0 (L) 15 - 25 mg/dL   ESTIMATED GFR    Collection Time: 02/14/19 11:00 PM   Result Value Ref Range    GFR If African American >60 >60 mL/min/1.73 m 2    GFR If Non African American >60 >60 mL/min/1.73 m 2   Urine Drug Screen    Collection Time: 02/14/19 11:20 PM   Result Value Ref Range    Amphetamines Urine Negative Negative    Barbiturates Negative Negative    Benzodiazepines Negative Negative    Cocaine Metabolite Negative Negative    Methadone Negative Negative    Opiates Negative Negative    Oxycodone Negative Negative    Phencyclidine -Pcp Negative Negative    Propoxyphene Negative Negative    Cannabinoid Metab Negative Negative       Imaging  DX-CHEST-PORTABLE (1 VIEW)    (Results Pending)       Assessment/Plan  * Suicide attempt (HCC)- (present on admission)   Assessment & Plan    Nifedipine XL overdose  Psychiatry consult  Legal hold     Calcium channel blocker overdose- (present on  admission)   Assessment & Plan    Nifedipine XL  Monitor for hypotension or bradycardia  Consider Intralipid and high-dose insulin infusion if she becomes symptomatic  Optimize electrolytes     Hypokalemia   Assessment & Plan    Check mag  Replete     HTN (hypertension)- (present on admission)   Assessment & Plan    Recent antihypertensive overdose  Restart antihypertensive medications when indicated     Post partum depression- (present on admission)   Assessment & Plan    Continue Zoloft 100 mg p.o. daily         Discussed patient condition and risk of morbidity and/or mortality with RN, RT, Pharmacy, [employer] [facility] resident, Charge nurse / hot rounds and Patient.      The patient remains critically ill.  Critical care time = 40 minutes in directly providing and coordinating critical care and extensive data review.  No time overlap and excludes procedures.

## 2019-02-15 NOTE — CARE PLAN
Problem: Safety  Goal: Will remain free from injury  Outcome: PROGRESSING AS EXPECTED  Pt is alert and oriented x4 and calls for help appropriately. Pt's call light is within reach, bed alarm is on, pt's room is visible from nurse's station, and pt is erwin non-slip footwear.     Problem: Psychosocial Needs:  Goal: Level of anxiety will decrease  Outcome: PROGRESSING AS EXPECTED  Pt is resting comfortably at this time and does not appear anxious or in distress. Will continue to monitor.

## 2019-02-15 NOTE — ASSESSMENT & PLAN NOTE
Was started on nifedipine post-delivery for persistent hypertension.     Plan:  Continue to hold Nifedipine as normotensive

## 2019-02-15 NOTE — PROCEDURES
Procedure Note    Date: 2/15/2019  Time: 1.00am    Procedure: Central Venous Line placement  Site: Right IJ vein    Indication: Hypotension  Consent: Informed consent obtained from patient or designated decision maker after explaining the benefits/risks of the procedure including but not limited to bleeding, infection, nerve or other deep structure injury, pneumothorax/hemothorax, arrythmia, or death. Patient or surrogate expressed understanding and agreement and signed consent which can be found in the patient's chart.    Procedure: After obtaining consent, a time-out was performed. Appropriate site confirmed with ultrasound and patient positioned, prepped, and draped in sterile fashion. All those present wearing cap and mask and those physically participating remained adhering to sterile fashion with cap, mask, gloves, and gown. 2mL of local anesthetic injected (1% lidocaine without epinephrine) achieving appropriate comfort level for patient. Vein localized and accessed using continuous ultrasound guidance and a 7 Fr triple lumen catheter placed using Seldinger technique. Able to aspirate dark, non-pulsatile blood through each lumen and sterile saline flushed easily before capping. Line secured and dressed in sterile fashion. Patient tolerated procedure well without any difficulties and remains in care of bedside nurse. CXR will be performed to confirm appropriate placement for internal jugular or subclavian CVLs.    EBL: minimal  Complications:  None   CXR:  Confirms placement in the SVC, no complications.

## 2019-02-15 NOTE — ASSESSMENT & PLAN NOTE
3 months postpartum.  On Zoloft, follows with therapist Leandra Shepherd as an outpatient.    Plan:  Continue Zoloft 100 mg daily

## 2019-02-15 NOTE — ASSESSMENT & PLAN NOTE
Has been normotensive off nifedipine during this admission    Plan:  Continue to hold nifedipine while normotensive

## 2019-02-15 NOTE — ED TRIAGE NOTES
"nifedapine 60mg tabs 20-30 pills she believes at \"couple hours ago\".  etoh sometime today..   Admits to SI..  Denies hi, a/v hallucinations.   Has been diagnosed with post partum depression 3 months ago, partner is not very \"suportive, told me I just dont want to be happy\".  On zoloft is complaints, baby safe at home with partner.  Charge called roomed to red1  "

## 2019-02-15 NOTE — PROGRESS NOTES
Pt transferred from ED to ICU bed R 113. Pt transported via gurney with ACLS RN and CCT, transport monitor, chart, and belongings. Pt's VSS upon arrival and pt has no complaints of pain. Pt denies current thoughts of wanting to harm herself or others.

## 2019-02-15 NOTE — ED PROVIDER NOTES
"ED Provider Note    ED Provider Note      Primary care provider: Sena Alonso M.D.    CHIEF COMPLAINT  Chief Complaint   Patient presents with   • Suicidal Ideation   • Drug Ingestion       HPI  Flori Car is a 23 y.o. female who presents to the Emergency Department with chief complaint of suicidal ideation/intentional overdose.  Patient reports that she took  mg extended release nifedipine's approximately 2 hours prior to presentation.  She said that she did so in an attempt to end her own life as she she was recently diagnosed with postpartum depression and she feels as though she is not handling the stress of motherhood well and that her significant other is not being supportive.  She denies homicidal ideation she states that she drank some alcohol earlier in the evening she denies any other ingestion.    REVIEW OF SYSTEMS  10 systems reviewed and otherwise negative, pertinent positives and negatives listed in the history of present illness.    PAST MEDICAL HISTORY   has a past medical history of Asthma; Postpartum depression, postpartum condition (12/5/2018); and Preeclampsia, severe, third trimester (11/11/2018).    SURGICAL HISTORY   has a past surgical history that includes primary c section (Bilateral, 11/7/2018).    SOCIAL HISTORY  Social History   Substance Use Topics   • Smoking status: Never Smoker   • Smokeless tobacco: Never Used   • Alcohol use No      History   Drug Use No       FAMILY HISTORY  Non-Contributory    CURRENT MEDICATIONS  Home Medications    **Home medications have not yet been reviewed for this encounter**         ALLERGIES  No Known Allergies    PHYSICAL EXAM  VITAL SIGNS: /58   Pulse (!) 145   Temp 37 °C (98.6 °F) (Temporal)   Resp (!) 22   Ht 1.6 m (5' 3\")   Wt 116.4 kg (256 lb 9.9 oz)   SpO2 98%   BMI 45.46 kg/m²   Pulse ox interpretation: I interpret this pulse ox as normal.  Constitutional: Alert and oriented x 3, minimal distress  HEENT: " Atraumatic normocephalic, pupils are equal round reactive to light extraocular movements are intact. The nares is clear, external ears are normal, mouth shows dry mucous membranes  Neck: Supple, no JVD no tracheal deviation  Cardiovascular: Tachycardic no murmur rub or gallop 2+ pulses peripherally x4  Thorax & Lungs: No respiratory distress, no wheezes rales or rhonchi, No chest tenderness.   GI: Soft nontender nondistended positive bowel sounds, no peritoneal signs  Skin: Warm dry no acute rash or lesion  Musculoskeletal: Moving all extremities with full range and 5 of 5 strength, no acute  deformity  Neurologic: Cranial nerves III through XII are grossly intact, no sensory deficit, no cerebellar dysfunction   Psychiatric: Tearful avoids direct eye contact      DIAGNOSTIC STUDIES / PROCEDURES  LABS      Results for orders placed or performed during the hospital encounter of 02/14/19   Urine Drug Screen   Result Value Ref Range    Amphetamines Urine Negative Negative    Barbiturates Negative Negative    Benzodiazepines Negative Negative    Cocaine Metabolite Negative Negative    Methadone Negative Negative    Opiates Negative Negative    Oxycodone Negative Negative    Phencyclidine -Pcp Negative Negative    Propoxyphene Negative Negative    Cannabinoid Metab Negative Negative   DIAGNOSTIC ALCOHOL   Result Value Ref Range    Diagnostic Alcohol 0.01 (H) 0.00 g/dL   CBC WITH DIFFERENTIAL   Result Value Ref Range    WBC 11.1 (H) 4.8 - 10.8 K/uL    RBC 5.16 4.20 - 5.40 M/uL    Hemoglobin 13.9 12.0 - 16.0 g/dL    Hematocrit 44.3 37.0 - 47.0 %    MCV 85.9 81.4 - 97.8 fL    MCH 26.9 (L) 27.0 - 33.0 pg    MCHC 31.4 (L) 33.6 - 35.0 g/dL    RDW 41.7 35.9 - 50.0 fL    Platelet Count 380 164 - 446 K/uL    MPV 9.1 9.0 - 12.9 fL    Neutrophils-Polys 53.50 44.00 - 72.00 %    Lymphocytes 35.20 22.00 - 41.00 %    Monocytes 6.40 0.00 - 13.40 %    Eosinophils 3.50 0.00 - 6.90 %    Basophils 0.60 0.00 - 1.80 %    Immature Granulocytes  0.80 0.00 - 0.90 %    Nucleated RBC 0.00 /100 WBC    Neutrophils (Absolute) 5.93 2.00 - 7.15 K/uL    Lymphs (Absolute) 3.90 1.00 - 4.80 K/uL    Monos (Absolute) 0.71 0.00 - 0.85 K/uL    Eos (Absolute) 0.39 0.00 - 0.51 K/uL    Baso (Absolute) 0.07 0.00 - 0.12 K/uL    Immature Granulocytes (abs) 0.09 0.00 - 0.11 K/uL    NRBC (Absolute) 0.00 K/uL   COMP METABOLIC PANEL   Result Value Ref Range    Sodium 138 135 - 145 mmol/L    Potassium 3.2 (L) 3.6 - 5.5 mmol/L    Chloride 107 96 - 112 mmol/L    Co2 21 20 - 33 mmol/L    Anion Gap 10.0 0.0 - 11.9    Glucose 112 (H) 65 - 99 mg/dL    Bun 17 8 - 22 mg/dL    Creatinine 0.84 0.50 - 1.40 mg/dL    Calcium 9.0 8.5 - 10.5 mg/dL    AST(SGOT) 23 12 - 45 U/L    ALT(SGPT) 29 2 - 50 U/L    Alkaline Phosphatase 72 30 - 99 U/L    Total Bilirubin 0.3 0.1 - 1.5 mg/dL    Albumin 4.2 3.2 - 4.9 g/dL    Total Protein 7.6 6.0 - 8.2 g/dL    Globulin 3.4 1.9 - 3.5 g/dL    A-G Ratio 1.2 g/dL   ACETAMINOPHEN   Result Value Ref Range    Acetaminophen -Tylenol <10 10 - 30 ug/mL   Salicylate   Result Value Ref Range    Salicylates, Quant. 0 (L) 15 - 25 mg/dL   ESTIMATED GFR   Result Value Ref Range    GFR If African American >60 >60 mL/min/1.73 m 2    GFR If Non African American >60 >60 mL/min/1.73 m 2   CBC WITH DIFFERENTIAL   Result Value Ref Range    WBC 13.8 (H) 4.8 - 10.8 K/uL    RBC 4.78 4.20 - 5.40 M/uL    Hemoglobin 13.0 12.0 - 16.0 g/dL    Hematocrit 40.7 37.0 - 47.0 %    MCV 85.1 81.4 - 97.8 fL    MCH 27.2 27.0 - 33.0 pg    MCHC 31.9 (L) 33.6 - 35.0 g/dL    RDW 41.1 35.9 - 50.0 fL    Platelet Count 331 164 - 446 K/uL    MPV 9.1 9.0 - 12.9 fL    Neutrophils-Polys 70.00 44.00 - 72.00 %    Lymphocytes 22.50 22.00 - 41.00 %    Monocytes 4.30 0.00 - 13.40 %    Eosinophils 2.20 0.00 - 6.90 %    Basophils 0.50 0.00 - 1.80 %    Immature Granulocytes 0.50 0.00 - 0.90 %    Nucleated RBC 0.00 /100 WBC    Neutrophils (Absolute) 9.66 (H) 2.00 - 7.15 K/uL    Lymphs (Absolute) 3.10 1.00 - 4.80 K/uL     Monos (Absolute) 0.59 0.00 - 0.85 K/uL    Eos (Absolute) 0.30 0.00 - 0.51 K/uL    Baso (Absolute) 0.07 0.00 - 0.12 K/uL    Immature Granulocytes (abs) 0.07 0.00 - 0.11 K/uL    NRBC (Absolute) 0.00 K/uL   Basic Metabolic Panel   Result Value Ref Range    Sodium 135 135 - 145 mmol/L    Potassium 3.8 3.6 - 5.5 mmol/L    Chloride 107 96 - 112 mmol/L    Co2 20 20 - 33 mmol/L    Glucose 104 (H) 65 - 99 mg/dL    Bun 14 8 - 22 mg/dL    Creatinine 0.66 0.50 - 1.40 mg/dL    Calcium 9.3 8.5 - 10.5 mg/dL    Anion Gap 8.0 0.0 - 11.9   PHOSPHORUS   Result Value Ref Range    Phosphorus 4.3 2.5 - 4.5 mg/dL   MAGNESIUM   Result Value Ref Range    Magnesium 1.5 1.5 - 2.5 mg/dL   ESTIMATED GFR   Result Value Ref Range    GFR If African American >60 >60 mL/min/1.73 m 2    GFR If Non African American >60 >60 mL/min/1.73 m 2   EKG   Result Value Ref Range    Report       [facility] Emergency Dept.    Test Date:  2019  Pt Name:    JARETH LUO                Department: ER  MRN:        [id number]                      Room:  Gender:     Female                       Technician: [id number]  :        1995                   Requested By:ER TRIAGE PROTOCOL  Order #:    [id number]                    Reading MD: GRETA GONZALES MD    Measurements  Intervals                                Axis  Rate:       124                          P:          48  MS:         115                          QRS:        29  QRSD:       100                          T:          -18  QT:         300  QTc:        431    Interpretive Statements  SINUS TACHYCARDIA  INFERIOR Q WAVES, PROBABLY NORMAL VARIATION  BORDERLINE REPOLARIZATION ABNORMALITY  ARTIFACT IN LEAD(S) II,III,aVR,aVL,aVF,V2  No previous ECG available for comparison    Electronically Signed On 2019 23:09:29 PST by GRETA GONZALES MD     EKG   Result Value Ref Range    Report       [facility]    Test Date:  2019-02-15  Pt Name:    JARETH LUO                 Department: [facility]  MRN:        [id number]                      Room:       R113  Gender:     Female                       Technician: [id number]  :        1995                   Requested By:FINESSE RODARTE  Order #:    [id number]                    Reading MD:    Measurements  Intervals                                Axis  Rate:       119                          P:          36  MD:         128                          QRS:        39  QRSD:       76                           T:          5  QT:         296  QTc:        417    Interpretive Statements  SINUS TACHYCARDIA  Compared to ECG 2019 22:50:38  Inferior Q waves no longer present  Q waves no longer present         All labs reviewed by me.      RADIOLOGY  No orders to display     The radiologist's interpretation of all radiological studies have been reviewed by me.    COURSE & MEDICAL DECISION MAKING  Pertinent Labs & Imaging studies reviewed. (See chart for details)    11:07 PM - Patient seen and examined at bedside.         Patient noted to have slightly elevated blood pressure likely circumstantial secondary to presenting complaint. Referred to primary care physician for further evaluation.     Patient was given IV fluids based on tachycardia dry mucous membranes, oral hydration was not attempted due to inability to tolerate p.o. and insufficiency for hydration, after fluids had improvement of hypertension tachycardia    Medical Decision Makin-year-old female with a large amount extended release calcium channel blocker overdose.  Initially she was normotensive and tachycardic shortly after arrival to the ED she did drop her blood pressure to systolic in the 80s over 50s.  Given 50 g activated charcoal.  She was given 1 mg of glucagon 3 g calcium gluconate and fluid bolus blood pressure responded nicely.  Central line was placed by ICU resident as a precautionary measure in light of possible large calcium channel blocker overdose.  Patient be admitted to  "the ICU for ongoing management discussed with intensivist Dr. Lovelace his help with this patient's greatly appreciated she is admitted in guarded condition.    /58   Pulse (!) 124   Temp 37.3 °C (99.2 °F) (Temporal)   Resp (!) 28   Ht 1.6 m (5' 3\")   Wt 121.5 kg (267 lb 13.7 oz)   SpO2 96%   Breastfeeding? No   BMI 47.45 kg/m²             FINAL IMPRESSION  1.  Suicidal ideation  2.  Intentional overdose  3.  Suicide attempt  4.  Extended release calcium channel blocker overdose.      This dictation has been created using voice recognition software and/or scribes. The accuracy of the dictation is limited by the abilities of the software and the expertise of the scribes. I expect there may be some errors of grammar and possibly content. I made every attempt to manually correct the errors within my dictation. However, errors related to voice recognition software and/or scribes may still exist and should be interpreted within the appropriate context.            "

## 2019-02-15 NOTE — PROGRESS NOTES
· 2 RN skin check complete with DANIELA Mckeon.  · Devices in place EKG leads, BP cuff, Pulse ox, SCD's, PIV x2, RIJ Central line. .  · Skin assessed under devices Yes.  · Confirmed pressure ulcers found on N/A.  · New potential pressure ulcers noted on N/A.   · The following interventions in place: Pt turns self, low airloss mattress, repositioning of devices.

## 2019-02-15 NOTE — ASSESSMENT & PLAN NOTE
Nifedipine XL  Monitor for hypotension or bradycardia, improved  Consider Intralipid and high-dose insulin infusion if she becomes symptomatic, monitoring  Optimize electrolytes

## 2019-02-15 NOTE — PROGRESS NOTES
Pt's belongings taken and placed in legal 2000 locker on unit. Legal 2000 and suicide risk precautions explained to pt. Verbal understanding received from pt.

## 2019-02-15 NOTE — ASSESSMENT & PLAN NOTE
Nifedipine XL overdose  Psychiatry consult noted  Legal hold placed on 2/14 and subsequently extended by psychiatry in 2/15  Psychiatry recommending inpatient transfer for further psychiatric care  Case management currently working on transfer

## 2019-02-15 NOTE — ASSESSMENT & PLAN NOTE
Overdosed with 20-30 pills of 60 mg extended release nifedipine. Was tachycardic and hypotensive in the ED, responded to IVF, also received IV glucagon, calcium gluconate and activated charcoal.   Has been stable on the floor and is awaiting placement for discharge to an inpatient Psych facility.     Plan:  Legal hold extended by Psych on 2/18/19  Continuing 1:1 sitter  Medically clear for transfer to inpatient Psych facility, awaiting placement by Social Work

## 2019-02-15 NOTE — PSYCHIATRY
Psychiatry consult noted for suicidal ideation, attempt with purposeful overdose post-partum with over 30 pills of nifedipine. Pt will be followed by Dr. Agnes Moy and will be seen this afternoon. Thank you for the consult.

## 2019-02-15 NOTE — ASSESSMENT & PLAN NOTE
Patient overdosed with 20-30 pills of long acting nifedipine. Reports prior history of suicidal ideation multiple times. Currently with postpartum depression. Psych following, appreciate recs.     Plan:   Legal hold extended by Psych on 2/18/19  Continue 1:1 sitter  Medically clear for discharge to inpatient Psych facility, awaiting placement

## 2019-02-15 NOTE — ASSESSMENT & PLAN NOTE
Continue Zoloft 100 mg p.o. daily  Psychiatry evaluating, consider alternative options  Counseling recommended and ongoing  Inpatient transfer for further psychiatric care recommended

## 2019-02-15 NOTE — H&P
Internal Medicine Admitting History and Physical    Note Author: Anna Martinez M.D.       Name Flori Car 1995   Age/Sex 23 y.o. female   MRN [id number]   Code Status FULL     After 5PM or if no immediate response to page, please call for cross-coverage  Attending/Team: Dr Cisneros / JESSICA See Patient List for primary contact information  Call [phone] to page    Resident - Dr Martinez        Chief Complaint:   Suicidal attempt with calcium channel drug Overdose.    HPI:    22 yo female with a PMH of hypertension, post partum depression who presented with suicidal attempt with  drug overdose. Patient took 20 - 30 pills of  60mg nifedipine extended release ~ 8pm . She also took 8 oz of vodka.  Patient has a 3 months old baby and has post partum depression. She see a therapist for this and is on zoloft  100mg daily.  She states she has been having relationship problems with her boy friend which has worsened over the past month.  Following the ingestion, she admits to nausea, no vomiting  She denies any dizziness or syncope of LOC. No abdominal pain.    In the ED she was  Tachycardic with a HR of 134, RR 22, BP was 115/58mmHg. She later became hypotensive , given IVF, which she responded to.  EKG showed sinus tachycardia, . UDS was negative. BAL 0.01, salicylate and acetaminophen levels negligible. She received  Calcium gluconate, glucagon and  Activated charcoal. A central line was in anticipation for  resisitant hyoptension needing pressors.      Review of Systems   Constitutional: Negative for chills and fever.   HENT: Negative for congestion and sore throat.    Respiratory: Negative for cough, shortness of breath and wheezing.    Cardiovascular: Negative for chest pain, leg swelling and PND.   Gastrointestinal: Positive for nausea. Negative for abdominal pain, blood in stool, diarrhea and vomiting.   Genitourinary: Negative for frequency and hematuria.   Musculoskeletal: Negative  for back pain and neck pain.   Neurological: Negative for dizziness, focal weakness, seizures, weakness and headaches.   Psychiatric/Behavioral: Positive for depression and suicidal ideas.       Past Medical History (Chronic medical problem, known complications and current treatment).     Post Partum Depression  Hypertension.    Past Surgical History:  Past Surgical History:   Procedure Laterality Date   • PRIMARY C SECTION Bilateral 11/7/2018    Procedure: PRIMARY C SECTION;  Surgeon: Carlos Fong M.D.;  Location: LABOR AND DELIVERY;  Service: Obstetrics       Current Outpatient Medications:  Home Medications     Reviewed by Amilcar Manjarrez R.N. (Registered Nurse) on 02/15/19 at 0210  Med List Status: <None>   Medication Last Dose Status   docusate sodium 100 MG Cap  Active   fluticasone (FLONASE) 50 MCG/ACT nasal spray  Active   labetalol (NORMODYNE) 200 MG Tab  Active   NIFEdipine (ADALAT CC) 60 MG CR tablet  Active   Prenatal Vit-Fe Fumarate-FA (PRENATAL 1+1 PO)  Active   sertraline (ZOLOFT) 50 MG Tab  Active                Medication Allergy/Sensitivities:  No Known Allergies      Family History (mandatory)   Family History   Problem Relation Age of Onset   • Adopted: Yes   • Obesity Mother    • Drug abuse Mother    • Depression Mother    • Cancer Mother    • Drug abuse Father    • Obesity Father    • Depression Father    • Cancer Maternal Aunt    • Hypertension Maternal Aunt    • Anemia Maternal Aunt    • Cancer Maternal Grandmother    • Obesity Maternal Grandmother        Social History (mandatory)   Social History     Social History   • Marital status: Single     Spouse name: N/A   • Number of children: N/A   • Years of education: N/A     Occupational History   • Not on file.     Social History Main Topics   • Smoking status: Never Smoker   • Smokeless tobacco: Never Used   • Alcohol use No   • Drug use: No   • Sexual activity: Yes     Partners: Male     Birth control/ protection: Pill     Other  "Topics Concern   • Not on file     Social History Narrative   • No narrative on file     Living situation: lives with   PCP : Sena Alonso M.D.    Physical Exam     Vitals:    02/15/19 0145 02/15/19 0155 02/15/19 0200 02/15/19 0300   BP:       Pulse:  (!) 128 (!) 122 (!) 120   Resp:  17 19 (!) 24   Temp:   37.1 °C (98.8 °F)    TempSrc:   Temporal    SpO2:  98% 98% 94%   Weight:   121.5 kg (267 lb 13.7 oz)    Height: 1.6 m (5' 3\")  1.6 m (5' 3\")      Body mass index is 47.45 kg/m².  /58   Pulse (!) 120   Temp 37.1 °C (98.8 °F) (Temporal)   Resp (!) 24   Ht 1.6 m (5' 3\")   Wt 121.5 kg (267 lb 13.7 oz)   SpO2 94%   Breastfeeding? No   BMI 47.45 kg/m²   O2 therapy: Pulse Oximetry: 94 %, O2 (LPM): 0, O2 Delivery: None (Room Air)    Physical Exam   Constitutional: She is oriented to person, place, and time and well-developed, well-nourished, and in no distress.   Young female, tearful   HENT:   Head: Normocephalic and atraumatic.   Eyes: Pupils are equal, round, and reactive to light. Conjunctivae are normal.   Neck: Normal range of motion. Neck supple.   Cardiovascular: Regular rhythm and normal heart sounds.  Exam reveals no gallop and no friction rub.    No murmur heard.  Tachycardia   Pulmonary/Chest: Effort normal and breath sounds normal. She has no wheezes.   Abdominal: Bowel sounds are normal. She exhibits no distension. There is no tenderness.   Musculoskeletal: Normal range of motion. She exhibits no edema or tenderness.   Neurological: She is alert and oriented to person, place, and time.   Psychiatric:   Tearful ,sad looking, flat affect          Data Review       Old Records Request:   Deferred  Current Records review/summary: Completed    Lab Data Review:  Recent Results (from the past 24 hour(s))   EKG    Collection Time: 02/14/19 10:50 PM   Result Value Ref Range    Report       [facility] Emergency Dept.    Test Date:  2019-02-14  Pt Name:    JARETH LUO"     Department: ER  MRN:        [id number]                      Room:  Gender:     Female                       Technician: [id number]  :        1995                   Requested By:ER TRIAGE PROTOCOL  Order #:    [id number]                    Reading MD: GRETA GONZALES MD    Measurements  Intervals                                Axis  Rate:       124                          P:          48  MI:         115                          QRS:        29  QRSD:       100                          T:          -18  QT:         300  QTc:        431    Interpretive Statements  SINUS TACHYCARDIA  INFERIOR Q WAVES, PROBABLY NORMAL VARIATION  BORDERLINE REPOLARIZATION ABNORMALITY  ARTIFACT IN LEAD(S) II,III,aVR,aVL,aVF,V2  No previous ECG available for comparison    Electronically Signed On 2019 23:09:29 PST by GRETA GONZALES MD     DIAGNOSTIC ALCOHOL    Collection Time: 19 11:00 PM   Result Value Ref Range    Diagnostic Alcohol 0.01 (H) 0.00 g/dL   CBC WITH DIFFERENTIAL    Collection Time: 19 11:00 PM   Result Value Ref Range    WBC 11.1 (H) 4.8 - 10.8 K/uL    RBC 5.16 4.20 - 5.40 M/uL    Hemoglobin 13.9 12.0 - 16.0 g/dL    Hematocrit 44.3 37.0 - 47.0 %    MCV 85.9 81.4 - 97.8 fL    MCH 26.9 (L) 27.0 - 33.0 pg    MCHC 31.4 (L) 33.6 - 35.0 g/dL    RDW 41.7 35.9 - 50.0 fL    Platelet Count 380 164 - 446 K/uL    MPV 9.1 9.0 - 12.9 fL    Neutrophils-Polys 53.50 44.00 - 72.00 %    Lymphocytes 35.20 22.00 - 41.00 %    Monocytes 6.40 0.00 - 13.40 %    Eosinophils 3.50 0.00 - 6.90 %    Basophils 0.60 0.00 - 1.80 %    Immature Granulocytes 0.80 0.00 - 0.90 %    Nucleated RBC 0.00 /100 WBC    Neutrophils (Absolute) 5.93 2.00 - 7.15 K/uL    Lymphs (Absolute) 3.90 1.00 - 4.80 K/uL    Monos (Absolute) 0.71 0.00 - 0.85 K/uL    Eos (Absolute) 0.39 0.00 - 0.51 K/uL    Baso (Absolute) 0.07 0.00 - 0.12 K/uL    Immature Granulocytes (abs) 0.09 0.00 - 0.11 K/uL    NRBC (Absolute) 0.00 K/uL   COMP METABOLIC PANEL    Collection  Time: 02/14/19 11:00 PM   Result Value Ref Range    Sodium 138 135 - 145 mmol/L    Potassium 3.2 (L) 3.6 - 5.5 mmol/L    Chloride 107 96 - 112 mmol/L    Co2 21 20 - 33 mmol/L    Anion Gap 10.0 0.0 - 11.9    Glucose 112 (H) 65 - 99 mg/dL    Bun 17 8 - 22 mg/dL    Creatinine 0.84 0.50 - 1.40 mg/dL    Calcium 9.0 8.5 - 10.5 mg/dL    AST(SGOT) 23 12 - 45 U/L    ALT(SGPT) 29 2 - 50 U/L    Alkaline Phosphatase 72 30 - 99 U/L    Total Bilirubin 0.3 0.1 - 1.5 mg/dL    Albumin 4.2 3.2 - 4.9 g/dL    Total Protein 7.6 6.0 - 8.2 g/dL    Globulin 3.4 1.9 - 3.5 g/dL    A-G Ratio 1.2 g/dL   ACETAMINOPHEN    Collection Time: 02/14/19 11:00 PM   Result Value Ref Range    Acetaminophen -Tylenol <10 10 - 30 ug/mL   Salicylate    Collection Time: 02/14/19 11:00 PM   Result Value Ref Range    Salicylates, Quant. 0 (L) 15 - 25 mg/dL   ESTIMATED GFR    Collection Time: 02/14/19 11:00 PM   Result Value Ref Range    GFR If African American >60 >60 mL/min/1.73 m 2    GFR If Non African American >60 >60 mL/min/1.73 m 2   Urine Drug Screen    Collection Time: 02/14/19 11:20 PM   Result Value Ref Range    Amphetamines Urine Negative Negative    Barbiturates Negative Negative    Benzodiazepines Negative Negative    Cocaine Metabolite Negative Negative    Methadone Negative Negative    Opiates Negative Negative    Oxycodone Negative Negative    Phencyclidine -Pcp Negative Negative    Propoxyphene Negative Negative    Cannabinoid Metab Negative Negative       Imaging/Procedures Review:    Independant Imaging Review: Completed  DX-CHEST-PORTABLE (1 VIEW)   Final Result      Right internal jugular catheter has been placed and projects appropriately over the SVC             EKG:   EKG Independent Review: Completed  QTc:431, HR:124 , Normal Sinus Rhythm, no ST/T changes     Records reviewed and summarized in current documentation :  Yes  [employer] teaching service handout given to patient:  No         Assessment/Plan     * Suicide attempt (HCC)- (present on  admission)   Assessment & Plan    Patient overdosed with 20- 30 pills of long acting nifedipine.  No prior hx of suicidal attempt. Denies HI.    Plan  Legal hold,   Psych consult in the AM      Calcium channel blocker overdose- (present on admission)   Assessment & Plan    - overdose with 20-30 pills of 60 mg extended release nifedipine.  -  Presented with Tachycardic with normal BP, became hypotensive which responded to IVF   - Received IV glucagon, calcium gluconate and activated charcoal.  - Rt  IJ central line in anticipation for hypotension, needing pressors.    Plan.  - Admit in the ICU for close cardiac monitoring, hypotesion  - EKG q8 hours.   - Pressor, nor epinephrine if hypotensive, insulin for hyperglycemia.     Hypokalemia   Assessment & Plan    Serum K of 3.2 .  Replete.     HTN (hypertension)- (present on admission)   Assessment & Plan    - Patient takes nifedipine and BB  - Hold meds given overdose  - Resume when appropriate      Post partum depression- (present on admission)   Assessment & Plan    3 months  post partum.  On zoloft and follows with a therapist, Leandra Shepherd.  Continue zoloft.           Anticipated Hospital stay:  >2 midnights        Quality Measures  Quality-Core Measures   Reviewed items::  EKG reviewed, Labs reviewed and Medications reviewed  DVT prophylaxis pharmacological::  Enoxaparin (Lovenox)    PCP: Sena Alonso M.D.

## 2019-02-15 NOTE — DISCHARGE PLANNING
Anticipated Discharge Disposition: IP MH Treatment    Action: Had attending physician certify [facility]. Faxed [facility] and left message for Mariana HUNT.     Barriers to Discharge: Medical Clearance    Plan: Pt will most likely dc to an IP MH Treatment Facility

## 2019-02-15 NOTE — PROGRESS NOTES
[facility] ICU Progress Note      Admit Date: 2/14/2019  ICU Day:1    Resident(s): Lit Mccormack  Attending: ELDER LOPEZ/ Dr. Booker     Date & Time:   2/15/2019   8:47 AM       Patient ID:    Name:             Flori Car   YOB: 1995  Age:                 23 y.o.  female   MRN:               [id number]    HPI:  24 yo female with a PMH of hypertension, post partum depression who presented with suicidal attempt with  drug overdose. Patient took 20 - 30 pills of  60mg nifedipine extended release ~ 8pm . She also took 8 oz of vodka.  Patient has a 3 months old baby and has post partum depression. She see a therapist for this and is on zoloft  100mg daily.  She states she has been having relationship problems with her boy friend which has worsened over the past month.  Following the ingestion, she admits to nausea, no vomiting  She denies any dizziness or syncope of LOC. No abdominal pain.  Admitted to ICU on account of hypotension, and for monitor      Of note, patient who has 3 months old baby, was diagnosed with depression after delivery, and also had preeclampsia, and persistent hypertension afterwards.  Started on Zoloft and nifedipine after delivery.  She takes nifedipine 60 mg, and labetalol 400 mg every 8 hours  She has been suicidal on multiple occasions, she states    Consultants:  PMA: Dr. Cisneros/Dr Booker     Interval Events:  No acute overnight event,  Has been tachycardic, in the 120s systolic  Blood pressure in low 100s/50s   Received glucagon, and charcoal in the ED  Leukocytosis of 13.8, no left shift  Potassium normalized from 3.2-3.8  Adequate urine overnights  Psych  consult     Review of Systems   Constitutional: Negative for chills, fever, malaise/fatigue and weight loss.   HENT: Negative for congestion and sore throat.    Eyes: Negative for blurred vision, double vision and photophobia.   Respiratory: Negative for cough, sputum production, shortness of breath and wheezing.   "  Cardiovascular: Negative for chest pain, palpitations, orthopnea, claudication, leg swelling and PND.   Gastrointestinal: Negative for abdominal pain, blood in stool, constipation, diarrhea, melena, nausea and vomiting.   Genitourinary: Negative for dysuria.   Musculoskeletal: Negative for neck pain.   Skin: Negative for rash.   Neurological: Negative for dizziness, tingling, seizures, loss of consciousness and headaches.   Endo/Heme/Allergies: Does not bruise/bleed easily.   Psychiatric/Behavioral: Positive for depression and suicidal ideas. Negative for hallucinations, memory loss and substance abuse. The patient is not nervous/anxious and does not have insomnia.        PHYSICAL EXAM  Vitals:    02/15/19 0500 02/15/19 0545 02/15/19 0600 02/15/19 0700   BP:       Pulse: (!) 116 (!) 117 (!) 123 (!) 124   Resp: 16 (!) 10 15 (!) 28   Temp:   37.3 °C (99.2 °F)    TempSrc:   Temporal    SpO2: 95% 96% 96% 96%   Weight:       Height:         Body mass index is 47.45 kg/m².  /58   Pulse (!) 124   Temp 37.3 °C (99.2 °F) (Temporal)   Resp (!) 28   Ht 1.6 m (5' 3\")   Wt 121.5 kg (267 lb 13.7 oz)   SpO2 96%   Breastfeeding? No   BMI 47.45 kg/m²   O2 therapy: Pulse Oximetry: 96 %, O2 (LPM): 0, O2 Delivery: None (Room Air)    Physical Exam  General: Young female, not in distress,  HEENT: Normocephalic, atraumatic, no jaundice or scleral icterus, no pallor, No cervical lymphadenopathy, no thyromegaly,  No tonsillar exudate, no throat erythyema,  PERLL, EOMI,   Respiratory:lungs clear to auscultation b/l, breath sounds vesicular, air entry adequate b/l,no wheezing, rales or crackles  Cardiac: Tachycardic, S1/S2 present, no M/R/G  GI: abdomen non distended, soft, non tender, no organomegaly, bowel sounds normoactive  Neuro: AAOX4, CN II-XII intact, sensation intact, strength 5/5 in all extremities,  Psychiatry: No agitation  Msk/Extremities: radial, dorsalis pedis pulses 2+b/l, no joint swelling or redness, ROM " intact, no lower  extremity edema  Skin: No rash    Respiratory:     Respiration: (!) 28, Pulse Oximetry: 96 %    Chest Tube Drains:          HemoDynamics:  Pulse: (!) 124, Heart Rate (Monitored): (!) 123 Blood Pressure: 115/58, NIBP: 106/59      Neuro:      Fluids:        Intake/Output Summary (Last 24 hours) at 02/15/19 0847  Last data filed at 02/15/19 0653   Gross per 24 hour   Intake             2310 ml   Output              900 ml   Net             1410 ml       Weight: 121.5 kg (267 lb 13.7 oz)  Body mass index is 47.45 kg/m².    Recent Labs      02/14/19   2300  02/15/19   0513   SODIUM  138  135   POTASSIUM  3.2*  3.8   CHLORIDE  107  107   CO2  21  20   BUN  17  14   CREATININE  0.84  0.66   MAGNESIUM   --   1.5   PHOSPHORUS   --   4.3   CALCIUM  9.0  9.3       GI/Nutrition:  Recent Labs      02/14/19   2300  02/15/19   0513   ALTSGPT  29   --    ASTSGOT  23   --    ALKPHOSPHAT  72   --    TBILIRUBIN  0.3   --    GLUCOSE  112*  104*       Heme:  Recent Labs      02/14/19   2300  02/15/19   0513   RBC  5.16  4.78   HEMOGLOBIN  13.9  13.0   HEMATOCRIT  44.3  40.7   PLATELETCT  380  331       Infectious Disease:  Temp  Av.1 °C (98.8 °F)  Min: 37 °C (98.6 °F)  Max: 37.3 °C (99.2 °F)  Recent Labs      02/14/19   2300  02/15/19   0513   WBC  11.1*  13.8*   NEUTSPOLYS  53.50  70.00   LYMPHOCYTES  35.20  22.50   MONOCYTES  6.40  4.30   EOSINOPHILS  3.50  2.20   BASOPHILS  0.60  0.50   ASTSGOT  23   --    ALTSGPT  29   --    ALKPHOSPHAT  72   --    TBILIRUBIN  0.3   --        Meds:  • acetaminophen  650 mg     • enoxaparin  40 mg     • sertraline  100 mg     • fluticasone  1 Spray     • prenatal plus vitamin  1 Tab     • magnesium sulfate  2 g          Procedures:  [facility]     Imaging:  DX-CHEST-PORTABLE (1 VIEW)   Final Result      Right internal jugular catheter has been placed and projects appropriately over the SVC          Problem and Plan:      * Suicide attempt (HCC)- (present on admission)   Assessment  & Plan    Patient overdosed with 20- 30 pills of long acting nifedipine.   has had prior history of suicidal ideations, multiple times she states  - Legal hold  -Psych consult     Calcium channel blocker overdose- (present on admission)   Assessment & Plan    overdose with 20-30 pills of 60 mg extended release nifedipine.  Was tachycardic with normal BP, became hypotensive which responded to IVF   Received IV glucagon, calcium gluconate and activated charcoal.  -Monitor with EKG q8 hours.   - Pressor,  if hypotensive, insulin for hyperglycemia.     Hypokalemia   Assessment & Plan    Serum K of 3.2 .  Currently normalized 3.8  Follow and replete accordingly     Post partum depression- (present on admission)   Assessment & Plan    3 months  post partum.  On zoloft and follows with a therapist, Leandra Shepherd.  Continue zoloft.       History of preeclampsia, severe, third trimester- (present on admission)   Assessment & Plan    Was started on nifedipine, post delivery persistent hypertension       HTN (hypertension)- (present on admission)   Assessment & Plan     takes nifedipine 60 mg daily and labetalol 400 mg q. 8  - Hold meds given overdose  -Blood pressure still low in the 110s,  -Continue to hold antihypertensive medication         DISPO: ICU, likely transfer to floor later today or tomorrow  CODE STATUS: Full    Quality Measures:  Mcghee Catheter: None  DVT Prophylaxis: Enoxaparin  Ulcer Prophylaxis:: Not indicated  Antibiotics:: Not indicated  Lines:: Right IJ, PIV

## 2019-02-15 NOTE — DIETARY
NUTRITION SERVICES: BMI - Pt with BMI >40 (=Body mass index is 47.45 kg/m².). Weight loss counseling not appropriate in acute care setting. RECOMMEND - Referral to outpatient nutrition services for weight management after D/C.

## 2019-02-15 NOTE — PROGRESS NOTES
RCC    Reviewed Dr Cisneros's consult from today    A&O x4  ST 110s  SBp 120s  Following well  UO good  Afeb  Reg diet  Commode/EOB  CVC/PIVs  Mg/Ca repeltion  Psych  Legal hold

## 2019-02-15 NOTE — PROGRESS NOTES
Patient's nephew JT came by to see patient. Notified that visitors have to be postponed until the patient has been evaluated by psych and a decision for visitors has been made. The nurse has DANIEL's phone number and will contact him following the evaluation with an answer.

## 2019-02-16 PROBLEM — E66.9 OBESITY: Status: ACTIVE | Noted: 2019-02-16

## 2019-02-16 PROBLEM — E66.9 OBESITY: Status: RESOLVED | Noted: 2019-02-16 | Resolved: 2019-02-16

## 2019-02-16 LAB
ANION GAP SERPL CALC-SCNC: 5 MMOL/L (ref 0–11.9)
BASOPHILS # BLD AUTO: 0.5 % (ref 0–1.8)
BASOPHILS # BLD: 0.04 K/UL (ref 0–0.12)
BUN SERPL-MCNC: 11 MG/DL (ref 8–22)
CALCIUM SERPL-MCNC: 8.8 MG/DL (ref 8.5–10.5)
CHLORIDE SERPL-SCNC: 106 MMOL/L (ref 96–112)
CO2 SERPL-SCNC: 22 MMOL/L (ref 20–33)
CREAT SERPL-MCNC: 0.64 MG/DL (ref 0.5–1.4)
EKG IMPRESSION: NORMAL
EOSINOPHIL # BLD AUTO: 0.2 K/UL (ref 0–0.51)
EOSINOPHIL NFR BLD: 2.4 % (ref 0–6.9)
ERYTHROCYTE [DISTWIDTH] IN BLOOD BY AUTOMATED COUNT: 41.6 FL (ref 35.9–50)
GLUCOSE SERPL-MCNC: 95 MG/DL (ref 65–99)
HCT VFR BLD AUTO: 39.5 % (ref 37–47)
HGB BLD-MCNC: 12.4 G/DL (ref 12–16)
IMM GRANULOCYTES # BLD AUTO: 0.04 K/UL (ref 0–0.11)
IMM GRANULOCYTES NFR BLD AUTO: 0.5 % (ref 0–0.9)
LYMPHOCYTES # BLD AUTO: 1.96 K/UL (ref 1–4.8)
LYMPHOCYTES NFR BLD: 23.8 % (ref 22–41)
MAGNESIUM SERPL-MCNC: 2.1 MG/DL (ref 1.5–2.5)
MCH RBC QN AUTO: 26.6 PG (ref 27–33)
MCHC RBC AUTO-ENTMCNC: 31.4 G/DL (ref 33.6–35)
MCV RBC AUTO: 84.8 FL (ref 81.4–97.8)
MONOCYTES # BLD AUTO: 0.45 K/UL (ref 0–0.85)
MONOCYTES NFR BLD AUTO: 5.5 % (ref 0–13.4)
NEUTROPHILS # BLD AUTO: 5.55 K/UL (ref 2–7.15)
NEUTROPHILS NFR BLD: 67.3 % (ref 44–72)
NRBC # BLD AUTO: 0 K/UL
NRBC BLD-RTO: 0 /100 WBC
PHOSPHATE SERPL-MCNC: 3.3 MG/DL (ref 2.5–4.5)
PLATELET # BLD AUTO: 307 K/UL (ref 164–446)
PMV BLD AUTO: 9.2 FL (ref 9–12.9)
POTASSIUM SERPL-SCNC: 3.6 MMOL/L (ref 3.6–5.5)
RBC # BLD AUTO: 4.66 M/UL (ref 4.2–5.4)
SODIUM SERPL-SCNC: 133 MMOL/L (ref 135–145)
T4 FREE SERPL-MCNC: 0.75 NG/DL (ref 0.53–1.43)
TSH SERPL DL<=0.005 MIU/L-ACNC: 1.98 UIU/ML (ref 0.38–5.33)
WBC # BLD AUTO: 8.2 K/UL (ref 4.8–10.8)

## 2019-02-16 PROCEDURE — 84443 ASSAY THYROID STIM HORMONE: CPT

## 2019-02-16 PROCEDURE — A9270 NON-COVERED ITEM OR SERVICE: HCPCS | Performed by: INTERNAL MEDICINE

## 2019-02-16 PROCEDURE — 85025 COMPLETE CBC W/AUTO DIFF WBC: CPT

## 2019-02-16 PROCEDURE — 93010 ELECTROCARDIOGRAM REPORT: CPT | Performed by: INTERNAL MEDICINE

## 2019-02-16 PROCEDURE — 93005 ELECTROCARDIOGRAM TRACING: CPT | Performed by: INTERNAL MEDICINE

## 2019-02-16 PROCEDURE — 83735 ASSAY OF MAGNESIUM: CPT

## 2019-02-16 PROCEDURE — 93010 ELECTROCARDIOGRAM REPORT: CPT | Mod: 76 | Performed by: INTERNAL MEDICINE

## 2019-02-16 PROCEDURE — 84439 ASSAY OF FREE THYROXINE: CPT

## 2019-02-16 PROCEDURE — 700102 HCHG RX REV CODE 250 W/ 637 OVERRIDE(OP): Performed by: INTERNAL MEDICINE

## 2019-02-16 PROCEDURE — 80048 BASIC METABOLIC PNL TOTAL CA: CPT

## 2019-02-16 PROCEDURE — A9270 NON-COVERED ITEM OR SERVICE: HCPCS | Performed by: STUDENT IN AN ORGANIZED HEALTH CARE EDUCATION/TRAINING PROGRAM

## 2019-02-16 PROCEDURE — 84100 ASSAY OF PHOSPHORUS: CPT

## 2019-02-16 PROCEDURE — 700102 HCHG RX REV CODE 250 W/ 637 OVERRIDE(OP): Performed by: STUDENT IN AN ORGANIZED HEALTH CARE EDUCATION/TRAINING PROGRAM

## 2019-02-16 PROCEDURE — 770020 HCHG ROOM/CARE - TELE (206)

## 2019-02-16 PROCEDURE — 99233 SBSQ HOSP IP/OBS HIGH 50: CPT | Performed by: INTERNAL MEDICINE

## 2019-02-16 RX ORDER — POTASSIUM CHLORIDE 20 MEQ/1
40 TABLET, EXTENDED RELEASE ORAL ONCE
Status: COMPLETED | OUTPATIENT
Start: 2019-02-16 | End: 2019-02-16

## 2019-02-16 RX ADMIN — SERTRALINE 100 MG: 50 TABLET, FILM COATED ORAL at 04:56

## 2019-02-16 RX ADMIN — POTASSIUM CHLORIDE 40 MEQ: 1500 TABLET, EXTENDED RELEASE ORAL at 11:40

## 2019-02-16 ASSESSMENT — ENCOUNTER SYMPTOMS
SORE THROAT: 0
ORTHOPNEA: 0
NERVOUS/ANXIOUS: 0
DIZZINESS: 0
BLURRED VISION: 0
SPUTUM PRODUCTION: 0
CLAUDICATION: 0
HEADACHES: 0
CHILLS: 0
DIARRHEA: 0
LOSS OF CONSCIOUSNESS: 0
WHEEZING: 0
SHORTNESS OF BREATH: 0
BACK PAIN: 0
EYES NEGATIVE: 1
PND: 0
TINGLING: 0
WEIGHT LOSS: 0
NAUSEA: 0
DEPRESSION: 1
BLOOD IN STOOL: 0
DIAPHORESIS: 0
PHOTOPHOBIA: 0
WEAKNESS: 0
INSOMNIA: 0
DOUBLE VISION: 0
CONSTIPATION: 0
ABDOMINAL PAIN: 0
BRUISES/BLEEDS EASILY: 0
FEVER: 0
PALPITATIONS: 0
NECK PAIN: 0
VOMITING: 0
SEIZURES: 0
MEMORY LOSS: 0
FOCAL WEAKNESS: 0
COUGH: 0
HALLUCINATIONS: 0

## 2019-02-16 ASSESSMENT — LIFESTYLE VARIABLES: SUBSTANCE_ABUSE: 0

## 2019-02-16 NOTE — PSYCHIATRY
BRIEF PSYCHIATRIC CONSULT NOTE: patient seen, full note to follow.  -Legal Hold:extended  -Sitter:yes  -Restrictions:   -phone:yes   -visitors:yes, except baby's father Mookie.    -personal items: yes   -finger foods required:    -personal/undergarments clothes:   -medical bed:yes   -Orders Placed: routine  -Assessment:  -Plan:continue to follow

## 2019-02-16 NOTE — PROGRESS NOTES
Critical Care Progress Note    Date of admission  2/14/2019    Chief Complaint  23 y.o. female admitted 2/14/2019 with SI, she took 20-30 extended release nifedipine    Hospital Course    23 y.o. female with a past medical history of postpartum depression and asthma who presented 2/14/2019 after a suicide attempt.  She reports that she took 20-30 extended release nifedipine 60 mg tablets at 9 PM in an attempt to kill herself.  The patient had a baby in November of last year and was subsequently diagnosed with postpartum depression, she takes Zoloft and sees a therapist for this.  She has recently been feeling overwhelmed by the stresses of mother, feelings of inadequacy and lack of support from her significant other.  In the ER the patient was initially tachycardic and normotensive however became hypotensive during her stay in the ER and quickly rebounded with fluid resuscitation.  She was given 3 g of calcium gluconate in the ER and a central line has been placed the patient is to be admitted to the ICU for further monitoring in the setting of a long-acting channel blocker overdose.  At this time the patient has no complaints.      Interval Problem Update  Reviewed last 24 hour events:    A&O x4  ST 110s  SBp 130s  Denies SI, calm, following well  Tm 99.9  UO adequate  Taking PO well  Room air    TSH/throid panel  Replete K    Medically clear  Transfer to psych inpatient?  Needs sitter    Review of Systems  Review of Systems   Constitutional: Negative for chills, diaphoresis, fever and malaise/fatigue.   HENT: Negative for congestion and sore throat.    Eyes: Negative.    Respiratory: Negative for cough, sputum production and shortness of breath.    Cardiovascular: Negative for chest pain and palpitations.   Gastrointestinal: Negative for abdominal pain, nausea and vomiting.   Genitourinary: Negative.    Musculoskeletal: Negative for back pain.   Skin: Negative for rash.   Neurological: Negative for focal weakness,  weakness and headaches.   Endo/Heme/Allergies: Does not bruise/bleed easily.   Psychiatric/Behavioral: Positive for depression and suicidal ideas (denies SI now). Negative for substance abuse. The patient is not nervous/anxious.         Vital Signs for last 24 hours   Temp:  [36.7 °C (98.1 °F)-37.5 °C (99.5 °F)] 36.7 °C (98.1 °F)  Pulse:  [] 104  Resp:  [12-24] 16  SpO2:  [92 %-100 %] 96 %    Hemodynamic parameters for last 24 hours       Respiratory Information for the last 24 hours       Physical Exam   Physical Exam   Constitutional: She appears well-nourished. She is cooperative. No distress.   HENT:   Head: Normocephalic and atraumatic.   Mouth/Throat: Oropharynx is clear and moist.   Eyes: Pupils are equal, round, and reactive to light. EOM are normal. No scleral icterus.   Neck: Phonation normal. Neck supple. No JVD present. No thyromegaly present.   Cardiovascular: Regular rhythm and intact distal pulses.   No extrasystoles are present. Tachycardia present.  Exam reveals no gallop and no friction rub.    No murmur heard.  Pulmonary/Chest: No accessory muscle usage. No respiratory distress. She has no decreased breath sounds. She has no wheezes. She has no rhonchi. She has no rales.   Abdominal: Soft. There is no tenderness. There is no guarding and no CVA tenderness.   Neurological: She is alert. She has normal strength. No cranial nerve deficit or sensory deficit. GCS eye subscore is 4. GCS verbal subscore is 5. GCS motor subscore is 6.   Skin: She is not diaphoretic. No cyanosis. Nails show no clubbing.   Psychiatric: Her speech is normal. Her mood appears not anxious. She is not agitated. Cognition and memory are normal.       Medications  Current Facility-Administered Medications   Medication Dose Route Frequency Provider Last Rate Last Dose   • acetaminophen (TYLENOL) tablet 650 mg  650 mg Oral Q6HRS PRN Anna Martinez M.D.       • enoxaparin (LOVENOX) inj 40 mg  40 mg Subcutaneous DAILY Anna  FIDEL Martinez M.D.       • sertraline (ZOLOFT) tablet 100 mg  100 mg Oral DAILY Ramo Cisneros Jr., D.O.   100 mg at 02/16/19 0456   • fluticasone (FLONASE) nasal spray 50 mcg  1 Spray Nasal DAILY Ramo Cisneros Jr., D.O.       • prenatal plus vitamin (STUARTNATAL 1+1) 27-1 MG tablet 1 Tab  1 Tab Oral QAM Ramo Cisneros Jr., D.O.           Fluids    Intake/Output Summary (Last 24 hours) at 02/16/19 2121  Last data filed at 02/16/19 1700   Gross per 24 hour   Intake              360 ml   Output             1325 ml   Net             -965 ml       Laboratory          Recent Labs      02/14/19   2300  02/15/19   0513  02/16/19   0523   SODIUM  138  135  133*   POTASSIUM  3.2*  3.8  3.6   CHLORIDE  107  107  106   CO2  21  20  22   BUN  17  14  11   CREATININE  0.84  0.66  0.64   MAGNESIUM   --   1.5  2.1   PHOSPHORUS   --   4.3  3.3   CALCIUM  9.0  9.3  8.8     Recent Labs      02/14/19   2300  02/15/19   0513  02/16/19   0523   ALTSGPT  29   --    --    ASTSGOT  23   --    --    ALKPHOSPHAT  72   --    --    TBILIRUBIN  0.3   --    --    GLUCOSE  112*  104*  95     Recent Labs      02/14/19   2300  02/15/19   0513  02/16/19   0523   WBC  11.1*  13.8*  8.2   NEUTSPOLYS  53.50  70.00  67.30   LYMPHOCYTES  35.20  22.50  23.80   MONOCYTES  6.40  4.30  5.50   EOSINOPHILS  3.50  2.20  2.40   BASOPHILS  0.60  0.50  0.50   ASTSGOT  23   --    --    ALTSGPT  29   --    --    ALKPHOSPHAT  72   --    --    TBILIRUBIN  0.3   --    --      Recent Labs      02/14/19   2300  02/15/19   0513  02/16/19   0523   RBC  5.16  4.78  4.66   HEMOGLOBIN  13.9  13.0  12.4   HEMATOCRIT  44.3  40.7  39.5   PLATELETCT  380  331  307       Imaging  X-Ray:  I have personally reviewed the images and compared with prior images.  EKG:  I have personally reviewed the images and compared with prior images.    Assessment/Plan  * Suicide attempt (HCC)- (present on admission)   Assessment & Plan    Nifedipine XL overdose  Psychiatry consult noted  Legal hold  placed on 2/14 and subsequently extended by psychiatry in 2/15  Psychiatry recommending inpatient transfer for further psychiatric care  Case management currently working on transfer     Calcium channel blocker overdose- (present on admission)   Assessment & Plan    Nifedipine XL  Monitor for hypotension or bradycardia, improved  Consider Intralipid and high-dose insulin infusion if she becomes symptomatic, monitoring  Optimize electrolytes     Obesity   Assessment & Plan    BMI 47.45  Positive review of systems for NICOLAS  Check TSH and thyroid panel patient obese at the same time has sinus tachycardia  Outpatient sleep study with CPAP titration if abnormal  Patient counseled about behavioral modification weight loss  Patient counseled about sleep apnea including classic complications and usual treatment regimens IV stroke, MI, hypertension, dysrhythmia, depression, excessive sleepiness and driving/safety issues; nasal CPAP, orthodontic device and ENT surgery all along with behavior modification weight loss, patient voiced her understanding     Hypokalemia   Assessment & Plan    Check mag and replete as well  Replete replete daily as needed     Post partum depression- (present on admission)   Assessment & Plan    Continue Zoloft 100 mg p.o. daily  Psychiatry evaluating, consider alternative options  Counseling recommended and ongoing  Inpatient transfer for further psychiatric care recommended     History of preeclampsia, severe, third trimester- (present on admission)   Assessment & Plan    Monitor     HTN (hypertension)- (present on admission)   Assessment & Plan    Recent antihypertensive overdose  Restart antihypertensive medications when indicated          VTE:  Lovenox  Ulcer: Not Indicated  Lines: None    I have performed a physical exam and reviewed and updated ROS and Plan today (2/16/2019). In review of yesterday's note (2/15/2019), there are no changes except as documented above.     Discussed patient  condition and risk of morbidity and/or mortality with RN, RT, Pharmacy, [facility] resident, Charge nurse / hot rounds and Patient    RCC will sign off to [facility] internal medicine upon transfer out of the ICU  Patient will need a sitter if transferred out of the ICU  Legal hold in place after being extended by psychiatry

## 2019-02-16 NOTE — PSYCHIATRY
"PSYCHIATRIC CONSULTATION:  *Reason for admission:  Suicide attempt by OD on 30 pills of nifedipine   *Reason for consult: suicidal attempt by OD on 3- pills of nifedipine   *Requesting Physician:Lit Mccormack MD  Legal status:  on hold    *Chief Complaint: \"It was an accident, I didn't want to kill my self\"    HPI: Pt reported she was diagnosed with post partum depression about 3 weeks after delivery. She was started on zoloft and has been receiving psychotherapy where she has to do a thought log (seems CBT). Patient was very vague about the OD and her day yesterday overall, but admitted taking about 20-30 pills of nifedipine with the intent of killing herself. She felt her baby daughter would had be fine, or better off if she had . Pt says that she does not know if she was in the kitchen or living room when she took the pills. There were 5 adults in the house inclduing her partner when she OD. She didn't;t tell anyone about the OD, and then went to bed to try to sleep. She then regret about the OD, and [facility]. She didn't tell anyone where she was going, but stated taht her partner was caring for her baby. Unclear when patient started to have SI, but stated that she thought about taking the pills around 6pm. Patient says that she does not feel overwhelmed with being a mother, but that she has been very unhappy with her relationship. She stated that she was feeling depressed prior to getting pregnant, but that her mood improved during the pregnancy. The pregnancy had not been planned. She was not on psychotropic medication prior or during pregnancy. This is her first pregnancy. Pt endorses depressed mood, but denies any anhedonia, significant change in sleep beyond expected from caring for her baby, denied any change in energy, or concentration. She has been back to work full time and reported being able to perform her work well, not missing days of work. Patient reported increased appetite " this past 2 weeks at least, and has noted weight gain. She currently feels guilty about her OD. She denied any AVh, paranoia or delusions. She denied any acute maniac or anxious symptoms now or in the past. Pt reported that she has been doing her ADLs and IADLS. Pt at this time regrets her suicidal attempt, and stated that she should had talked to someone about her feelings. Her short goal is to move in with one of her sisters, and long term goal is to obtain her own house.       Psychiatric Review of Systems:current symptoms as reported by pt.  Depression:   Depressed, see HPI     Rashida:denies  Anxiety/Panic Attacks denies  PTSD symptom: denies  Psychosis:denies  Other:       Medical Review of Systems: as reported by pt. All systems reviewed.all other systems were reviewed and are negative     For teaching purposes the systems below must be noted, + or negative:  Neurological:    TBIs:denies   SZs:denies   Strokes:denies  Other medical symptoms:   Thyroid:denies   Diabetes:denies   Cardiovascular disease:denies    Psychiatric Examination: observed phenomenon:  Vitals:  Vitals:    02/15/19 1500   BP:    Pulse: (!) 119   Resp: (!) 23   Temp:    SpO2: 97%       Appearance: appears stated, age, fair grooming and hygiene, wearing hospital gown lying down in bed  Behavior is calm and cooperative  Good eye contact  Muscle Strength/Tone: some psychomotor retardation  Gait/Station:not tested  Speech:soft volume, normal rhythm  Thought Process:linear and goal directed, but concrete  Abnormal/Psychotic Thoughts (ex):denies any AVH, paranoia or delusions  Insight/Judgement:fair/limited  Orientation:grossly oriented  Memory:fair  Attention/Concentration:intact  Language:fluent in English   Mood:    Unhappy   Affect:   depressed        SI/HI:   Denies any SI/HI  Neurological Testing:( ie clock, cube drawing, MMSE, MOCA,etc.): not tested     Past Psychiatric Hx: hx of postpartum depression given given about 3 months ago,  currently on zoloft, no hx of SA/SIB, no prior psychiatric hospitalizations, connected to outpatient psychiatric services at [facility] in therapy with SULTANA Lan    Family Psychiatric Hx:denies    Social Hx:domiciled with partner and his family, has a 3 month old baby, employed full time, highest level if educations is some college, pt was born in [place], raised in [place], and moved to [place] 4 years ago with her partner. Pt was given custody to her aunt and uncle at age 4yo, and has not seen her biological parents since then.     Drug/Alcohol/Tobacco Hx:   Drugs:denies   Alcohol:ocasioanally    Tobacco:denies    Medical Hx: labs, MARS, medications, etc were reviewed. Only those findings of potential interest to psychiatry are noted below:  Medical Conditions:     Past Medical History:   Diagnosis Date   • Asthma    • Postpartum depression, postpartum condition 12/5/2018   • Preeclampsia, severe, third trimester 11/11/2018       Allergies: Patient has no known allergies.    Medications (currently prescribed at [facility]):   Current Facility-Administered Medications   Medication Dose Route Frequency Provider Last Rate Last Dose   • acetaminophen (TYLENOL) tablet 650 mg  650 mg Oral Q6HRS PRN Anna Martinez M.D.       • enoxaparin (LOVENOX) inj 40 mg  40 mg Subcutaneous DAILY Anna Martinez M.D.       • sertraline (ZOLOFT) tablet 100 mg  100 mg Oral DAILY Ramo Cisneros Jr., D.O.   100 mg at 02/15/19 0506   • fluticasone (FLONASE) nasal spray 50 mcg  1 Spray Nasal DAILY Ramo Cisneros Jr., D.O.       • prenatal plus vitamin (STUARTNATAL 1+1) 27-1 MG tablet 1 Tab  1 Tab Oral QAM Ramo Cisneros Jr., D.O.           Labs:   Recent Results (from the past 48 hour(s))   EKG    Collection Time: 02/14/19 10:50 PM   Result Value Ref Range    Report       [facility] Emergency Dept.    Test Date:  2019-02-14  Pt Name:    JARETH LUO                Department: ER  MRN:         [id number]                      Room:  Gender:     Female                       Technician: [id number]  :        1995                   Requested By:ER TRIAGE PROTOCOL  Order #:    [id number]                    Reading MD: GRETA GONZALES MD    Measurements  Intervals                                Axis  Rate:       124                          P:          48  OK:         115                          QRS:        29  QRSD:       100                          T:          -18  QT:         300  QTc:        431    Interpretive Statements  SINUS TACHYCARDIA  INFERIOR Q WAVES, PROBABLY NORMAL VARIATION  BORDERLINE REPOLARIZATION ABNORMALITY  ARTIFACT IN LEAD(S) II,III,aVR,aVL,aVF,V2  No previous ECG available for comparison    Electronically Signed On 2019 23:09:29 PST by GRETA GONZALES MD     DIAGNOSTIC ALCOHOL    Collection Time: 19 11:00 PM   Result Value Ref Range    Diagnostic Alcohol 0.01 (H) 0.00 g/dL   CBC WITH DIFFERENTIAL    Collection Time: 19 11:00 PM   Result Value Ref Range    WBC 11.1 (H) 4.8 - 10.8 K/uL    RBC 5.16 4.20 - 5.40 M/uL    Hemoglobin 13.9 12.0 - 16.0 g/dL    Hematocrit 44.3 37.0 - 47.0 %    MCV 85.9 81.4 - 97.8 fL    MCH 26.9 (L) 27.0 - 33.0 pg    MCHC 31.4 (L) 33.6 - 35.0 g/dL    RDW 41.7 35.9 - 50.0 fL    Platelet Count 380 164 - 446 K/uL    MPV 9.1 9.0 - 12.9 fL    Neutrophils-Polys 53.50 44.00 - 72.00 %    Lymphocytes 35.20 22.00 - 41.00 %    Monocytes 6.40 0.00 - 13.40 %    Eosinophils 3.50 0.00 - 6.90 %    Basophils 0.60 0.00 - 1.80 %    Immature Granulocytes 0.80 0.00 - 0.90 %    Nucleated RBC 0.00 /100 WBC    Neutrophils (Absolute) 5.93 2.00 - 7.15 K/uL    Lymphs (Absolute) 3.90 1.00 - 4.80 K/uL    Monos (Absolute) 0.71 0.00 - 0.85 K/uL    Eos (Absolute) 0.39 0.00 - 0.51 K/uL    Baso (Absolute) 0.07 0.00 - 0.12 K/uL    Immature Granulocytes (abs) 0.09 0.00 - 0.11 K/uL    NRBC (Absolute) 0.00 K/uL   COMP METABOLIC PANEL    Collection Time: 19 11:00 PM   Result  Value Ref Range    Sodium 138 135 - 145 mmol/L    Potassium 3.2 (L) 3.6 - 5.5 mmol/L    Chloride 107 96 - 112 mmol/L    Co2 21 20 - 33 mmol/L    Anion Gap 10.0 0.0 - 11.9    Glucose 112 (H) 65 - 99 mg/dL    Bun 17 8 - 22 mg/dL    Creatinine 0.84 0.50 - 1.40 mg/dL    Calcium 9.0 8.5 - 10.5 mg/dL    AST(SGOT) 23 12 - 45 U/L    ALT(SGPT) 29 2 - 50 U/L    Alkaline Phosphatase 72 30 - 99 U/L    Total Bilirubin 0.3 0.1 - 1.5 mg/dL    Albumin 4.2 3.2 - 4.9 g/dL    Total Protein 7.6 6.0 - 8.2 g/dL    Globulin 3.4 1.9 - 3.5 g/dL    A-G Ratio 1.2 g/dL   ACETAMINOPHEN    Collection Time: 19 11:00 PM   Result Value Ref Range    Acetaminophen -Tylenol <10 10 - 30 ug/mL   Salicylate    Collection Time: 19 11:00 PM   Result Value Ref Range    Salicylates, Quant. 0 (L) 15 - 25 mg/dL   ESTIMATED GFR    Collection Time: 19 11:00 PM   Result Value Ref Range    GFR If African American >60 >60 mL/min/1.73 m 2    GFR If Non African American >60 >60 mL/min/1.73 m 2   Urine Drug Screen    Collection Time: 19 11:20 PM   Result Value Ref Range    Amphetamines Urine Negative Negative    Barbiturates Negative Negative    Benzodiazepines Negative Negative    Cocaine Metabolite Negative Negative    Methadone Negative Negative    Opiates Negative Negative    Oxycodone Negative Negative    Phencyclidine -Pcp Negative Negative    Propoxyphene Negative Negative    Cannabinoid Metab Negative Negative   EKG    Collection Time: 02/15/19  4:20 AM   Result Value Ref Range    Report       [facility]    Test Date:  2019-02-15  Pt Name:    JARETH LUO                Department: [facility]  MRN:        [id number]                      Room:       R113  Gender:     Female                       Technician: THONY  :        1995                   Requested By:FINESSE RODARTE  Order #:    [id number]                    Reading MD: Allan Fine MD    Measurements  Intervals                                Axis  Rate:       119                           P:          36  NM:         128                          QRS:        39  QRSD:       76                           T:          5  QT:         296  QTc:        417    Interpretive Statements  SINUS TACHYCARDIA  Compared to ECG 02/14/2019 22:50:38  Inferior Q waves no longer present  Q waves no longer present    Electronically Signed On 2- 9:37:52 PST by Allan Fine MD     CBC WITH DIFFERENTIAL    Collection Time: 02/15/19  5:13 AM   Result Value Ref Range    WBC 13.8 (H) 4.8 - 10.8 K/uL    RBC 4.78 4.20 - 5.40 M/uL    Hemoglobin 13.0 12.0 - 16.0 g/dL    Hematocrit 40.7 37.0 - 47.0 %    MCV 85.1 81.4 - 97.8 fL    MCH 27.2 27.0 - 33.0 pg    MCHC 31.9 (L) 33.6 - 35.0 g/dL    RDW 41.1 35.9 - 50.0 fL    Platelet Count 331 164 - 446 K/uL    MPV 9.1 9.0 - 12.9 fL    Neutrophils-Polys 70.00 44.00 - 72.00 %    Lymphocytes 22.50 22.00 - 41.00 %    Monocytes 4.30 0.00 - 13.40 %    Eosinophils 2.20 0.00 - 6.90 %    Basophils 0.50 0.00 - 1.80 %    Immature Granulocytes 0.50 0.00 - 0.90 %    Nucleated RBC 0.00 /100 WBC    Neutrophils (Absolute) 9.66 (H) 2.00 - 7.15 K/uL    Lymphs (Absolute) 3.10 1.00 - 4.80 K/uL    Monos (Absolute) 0.59 0.00 - 0.85 K/uL    Eos (Absolute) 0.30 0.00 - 0.51 K/uL    Baso (Absolute) 0.07 0.00 - 0.12 K/uL    Immature Granulocytes (abs) 0.07 0.00 - 0.11 K/uL    NRBC (Absolute) 0.00 K/uL   Basic Metabolic Panel    Collection Time: 02/15/19  5:13 AM   Result Value Ref Range    Sodium 135 135 - 145 mmol/L    Potassium 3.8 3.6 - 5.5 mmol/L    Chloride 107 96 - 112 mmol/L    Co2 20 20 - 33 mmol/L    Glucose 104 (H) 65 - 99 mg/dL    Bun 14 8 - 22 mg/dL    Creatinine 0.66 0.50 - 1.40 mg/dL    Calcium 9.3 8.5 - 10.5 mg/dL    Anion Gap 8.0 0.0 - 11.9   PHOSPHORUS    Collection Time: 02/15/19  5:13 AM   Result Value Ref Range    Phosphorus 4.3 2.5 - 4.5 mg/dL   MAGNESIUM    Collection Time: 02/15/19  5:13 AM   Result Value Ref Range    Magnesium 1.5 1.5 - 2.5 mg/dL   ESTIMATED GFR     Collection Time: 02/15/19  5:13 AM   Result Value Ref Range    GFR If African American >60 >60 mL/min/1.73 m 2    GFR If Non African American >60 >60 mL/min/1.73 m 2   EKG    Collection Time: 02/15/19 12:25 PM   Result Value Ref Range    Report       [facility]    Test Date:  2019-02-15  Pt Name:    JARETH LUO                Department: [facility]  MRN:        [id number]                      Room:       R113  Gender:     Female                       Technician: MAYO  :        1995                   Requested By:FINESSE RODARTE  Order #:    [id number]                    Reading MD: Allan Fine MD    Measurements  Intervals                                Axis  Rate:       118                          P:          32  MN:         132                          QRS:        12  QRSD:       80                           T:          -8  QT:         320  QTc:        449    Interpretive Statements  SINUS TACHYCARDIA  BORDERLINE T ABNORMALITIES, INFERIOR LEADS  Compared to ECG 02/15/2019 04:20:53  T-wave abnormality now present    Electronically Signed On 2- 13:19:06 PST by Allan Fine MD             ECG: QTc 449    Cranial Imaging: not done    ASSESSMENT: Pt with recent diagnosis of post partum depression on Zoloft, who was admitted after a suicide attempt by OD on 2-30 pills of nifedipine. Pt had the intent of killing herself due to acute stressors in her relationship. As per chart, pt also reported being overwhelmed with being a new mother. Patient at this time is at elevated risk fo danger to self and her baby, and needs further higher level of psychiatric care in an inpatient psychiatric hospital.     Post partum depression, severe      PLAN:  Legal status: extended  Psychotropic meds: continue zoloft 100mg PO daily for mood (increased in this admission, will continue dose).   Pt declined psychotherapy treatment.   Please transfer pt to inpatient psychiatric hospital when medically cleared  Will  follow  Thank you for the consult.

## 2019-02-16 NOTE — CARE PLAN
Problem: Infection  Goal: Will remain free from infection    Intervention: Implement standard precautions and perform hand washing before and after patient contact  In place      Problem: Discharge Barriers/Planning  Goal: Patient's continuum of care needs will be met    Intervention: Assess potential discharge barriers on admission and throughout hospital stay  Awaiting medical clearance for placement to inpatient psych

## 2019-02-16 NOTE — CARE PLAN
Problem: Safety  Goal: Will remain free from injury  Outcome: PROGRESSING AS EXPECTED  Bed controls on. Bed locked. Bed alarm on. Appropriate signs outside doors. Patient oriented to surrounding. Education provided. Close observation monitoring followed. Psych consulted on patient. Verified that visitors and personal belongings are allowed.  Intervention: Collaborate with Interdisciplinary Team for safe transfer and mobilization techniques  Done      Problem: Psychosocial Needs:  Goal: Level of anxiety will decrease  Outcome: PROGRESSING AS EXPECTED  Collaborated with care team, including intensivist physician, and psych regarding patient needs. Legal hold continued.

## 2019-02-16 NOTE — CARE PLAN
Problem: Communication  Goal: The ability to communicate needs accurately and effectively will improve    Intervention: Educate patient and significant other/support system about the plan of care, procedures, treatments, medications and allow for questions  Pt Legal 2000 extended. Pt allowed belongings and certain visitors. POC discussed with pt.       Problem: Safety  Goal: Will remain free from injury    Intervention: Provide assistance with mobility  Pt educated to use call light to get out of bed.

## 2019-02-17 LAB
ANION GAP SERPL CALC-SCNC: 8 MMOL/L (ref 0–11.9)
BASOPHILS # BLD AUTO: 0.7 % (ref 0–1.8)
BASOPHILS # BLD: 0.06 K/UL (ref 0–0.12)
BUN SERPL-MCNC: 12 MG/DL (ref 8–22)
CALCIUM SERPL-MCNC: 9.4 MG/DL (ref 8.5–10.5)
CHLORIDE SERPL-SCNC: 107 MMOL/L (ref 96–112)
CO2 SERPL-SCNC: 23 MMOL/L (ref 20–33)
CREAT SERPL-MCNC: 0.66 MG/DL (ref 0.5–1.4)
EKG IMPRESSION: NORMAL
EKG IMPRESSION: NORMAL
EOSINOPHIL # BLD AUTO: 0.26 K/UL (ref 0–0.51)
EOSINOPHIL NFR BLD: 3 % (ref 0–6.9)
ERYTHROCYTE [DISTWIDTH] IN BLOOD BY AUTOMATED COUNT: 42.4 FL (ref 35.9–50)
GLUCOSE SERPL-MCNC: 92 MG/DL (ref 65–99)
HCT VFR BLD AUTO: 39.9 % (ref 37–47)
HGB BLD-MCNC: 12.8 G/DL (ref 12–16)
IMM GRANULOCYTES # BLD AUTO: 0.03 K/UL (ref 0–0.11)
IMM GRANULOCYTES NFR BLD AUTO: 0.3 % (ref 0–0.9)
LYMPHOCYTES # BLD AUTO: 2.61 K/UL (ref 1–4.8)
LYMPHOCYTES NFR BLD: 29.9 % (ref 22–41)
MAGNESIUM SERPL-MCNC: 2 MG/DL (ref 1.5–2.5)
MCH RBC QN AUTO: 27.4 PG (ref 27–33)
MCHC RBC AUTO-ENTMCNC: 32.1 G/DL (ref 33.6–35)
MCV RBC AUTO: 85.4 FL (ref 81.4–97.8)
MONOCYTES # BLD AUTO: 0.59 K/UL (ref 0–0.85)
MONOCYTES NFR BLD AUTO: 6.8 % (ref 0–13.4)
NEUTROPHILS # BLD AUTO: 5.19 K/UL (ref 2–7.15)
NEUTROPHILS NFR BLD: 59.3 % (ref 44–72)
NRBC # BLD AUTO: 0 K/UL
NRBC BLD-RTO: 0 /100 WBC
PHOSPHATE SERPL-MCNC: 3.8 MG/DL (ref 2.5–4.5)
PLATELET # BLD AUTO: 314 K/UL (ref 164–446)
PMV BLD AUTO: 8.9 FL (ref 9–12.9)
POTASSIUM SERPL-SCNC: 3.9 MMOL/L (ref 3.6–5.5)
RBC # BLD AUTO: 4.67 M/UL (ref 4.2–5.4)
SODIUM SERPL-SCNC: 138 MMOL/L (ref 135–145)
WBC # BLD AUTO: 8.7 K/UL (ref 4.8–10.8)

## 2019-02-17 PROCEDURE — 770001 HCHG ROOM/CARE - MED/SURG/GYN PRIV*

## 2019-02-17 PROCEDURE — 36415 COLL VENOUS BLD VENIPUNCTURE: CPT

## 2019-02-17 PROCEDURE — 99232 SBSQ HOSP IP/OBS MODERATE 35: CPT | Mod: GC | Performed by: STUDENT IN AN ORGANIZED HEALTH CARE EDUCATION/TRAINING PROGRAM

## 2019-02-17 PROCEDURE — 83735 ASSAY OF MAGNESIUM: CPT

## 2019-02-17 PROCEDURE — 93010 ELECTROCARDIOGRAM REPORT: CPT | Performed by: INTERNAL MEDICINE

## 2019-02-17 PROCEDURE — 93005 ELECTROCARDIOGRAM TRACING: CPT | Performed by: INTERNAL MEDICINE

## 2019-02-17 PROCEDURE — 80048 BASIC METABOLIC PNL TOTAL CA: CPT

## 2019-02-17 PROCEDURE — 84100 ASSAY OF PHOSPHORUS: CPT

## 2019-02-17 PROCEDURE — 85025 COMPLETE CBC W/AUTO DIFF WBC: CPT

## 2019-02-17 PROCEDURE — 700102 HCHG RX REV CODE 250 W/ 637 OVERRIDE(OP): Performed by: INTERNAL MEDICINE

## 2019-02-17 PROCEDURE — A9270 NON-COVERED ITEM OR SERVICE: HCPCS | Performed by: INTERNAL MEDICINE

## 2019-02-17 RX ADMIN — SERTRALINE 100 MG: 50 TABLET, FILM COATED ORAL at 06:32

## 2019-02-17 RX ADMIN — FLUTICASONE PROPIONATE 50 MCG: 50 SPRAY, METERED NASAL at 07:41

## 2019-02-17 ASSESSMENT — ENCOUNTER SYMPTOMS
CHILLS: 0
FEVER: 0
CONSTIPATION: 0
PALPITATIONS: 0
HALLUCINATIONS: 0
ABDOMINAL PAIN: 0
COUGH: 0
BLURRED VISION: 0
DOUBLE VISION: 0
DIARRHEA: 0
NAUSEA: 0
DEPRESSION: 1
VOMITING: 0
SHORTNESS OF BREATH: 0

## 2019-02-17 NOTE — CARE PLAN
Problem: Safety  Goal: Will remain free from injury  Pt mobility assessed at the beginning of the shift. Fall precautions in place, non-slip socks on, bed in lowest, locked position. Pt educated to call for assistance, and verbalizes understanding.    Problem: Knowledge Deficit  Goal: Knowledge of disease process/condition, treatment plan, diagnostic tests, and medications will improve  Educated pt on disease process, treatment plan, and reason for medications she is on.

## 2019-02-17 NOTE — CARE PLAN
Problem: Safety  Goal: Will remain free from injury  Outcome: PROGRESSING AS EXPECTED  No signs of injury noted. Patient has 1:1 sitter at bedside.    Problem: Infection  Goal: Will remain free from infection  Outcome: PROGRESSING AS EXPECTED  No signs or symptoms of infection noted    Problem: Psychosocial Needs:  Goal: Level of anxiety will decrease  Outcome: PROGRESSING AS EXPECTED

## 2019-02-17 NOTE — ASSESSMENT & PLAN NOTE
BMI 47.45  Positive review of systems for NICOLAS  Check TSH and thyroid panel patient obese at the same time has sinus tachycardia  Outpatient sleep study with CPAP titration if abnormal  Patient counseled about behavioral modification weight loss  Patient counseled about sleep apnea including classic complications and usual treatment regimens IV stroke, MI, hypertension, dysrhythmia, depression, excessive sleepiness and driving/safety issues; nasal CPAP, orthodontic device and ENT surgery all along with behavior modification weight loss, patient voiced her understanding

## 2019-02-17 NOTE — PROGRESS NOTES
Clarified conflicting transfer orders with [facility], they said patient has tele orders with 1 to 1 sitter.

## 2019-02-17 NOTE — PROGRESS NOTES
Dr. Rendon called to clarify transfer orders for the patient and stated that she had to be telemetry.

## 2019-02-17 NOTE — PROGRESS NOTES
Report received at the bed side. Pt A/O x4. No complains of pain or SOB. Fall precautions in place. 1:1 sitter at the bedside.

## 2019-02-17 NOTE — DISCHARGE PLANNING
Faxed behavioral health referral with medically cleared legal hold to [facility], [facility], [facility] and [facility].

## 2019-02-17 NOTE — PROGRESS NOTES
Internal Medicine Interval Note  Note Author: Leighton Mckenna M.D.     Name Flori Car 1995   Age/Sex 23 y.o. female   MRN [id number]   Code Status Full     After 5PM or if no immediate response to page, please call for cross-coverage  Attending/Team: Theo/Dada See Patient List for primary contact information  Call [phone] to page    1st Call - Day Intern (R1):   Dr. Mckenna 2nd Call - Day Sr. Resident (R2/R3):   Dr. Fleming         Reason for interval visit  (Principal Problem)   Intentional drug overdose - suicide attempt, Nifedipine   Post-partum depression     Interval Problem Daily Status Update  (24 hours, problem oriented, brief subjective history, new lab/imaging data pertinent to that problem)     - patient transferred from ICU in stable condition, with no EKG changes noted  - was admitted for intentional nifedipine overdose - she regret doing this and drove herself to the hospital for treatment  - was started on zoloft 100 mg daily  - seen by psych in ICU and awaiting inpatient psych transfer  - no acute complaints today  - 1:1 sitter, legal hold  - currently denies suicidal or homicidal ideation  - denies visual or auditory hallucinations  - denies chest pain, palpitations or dyspnea    - medical clearance signed today    Review of Systems   Constitutional: Negative for chills and fever.   Eyes: Negative for blurred vision and double vision.   Respiratory: Negative for cough and shortness of breath.    Cardiovascular: Negative for chest pain and palpitations.   Gastrointestinal: Negative for abdominal pain, constipation, diarrhea, nausea and vomiting.   Genitourinary: Negative for dysuria and urgency.   Skin: Negative for itching and rash.   Psychiatric/Behavioral: Positive for depression. Negative for hallucinations and suicidal ideas.   All other systems reviewed and are negative.      Disposition/Barriers to discharge:   Transfer to inpatient psych - medical clearance  signed 2/17/2019    Consultants/Specialty  ICU  Psychiatry     PCP: Sena Alonso M.D.      Quality Measures  Quality-Core Measures        Physical Exam       Vitals:    02/16/19 2300 02/16/19 2330 02/17/19 0044 02/17/19 0400   BP:   126/76 122/65   Pulse: 92 97 99 78   Resp: 18 14 18 18   Temp:   36.6 °C (97.8 °F) 36.7 °C (98.1 °F)   TempSrc:   Temporal Temporal   SpO2: 95%  95% 100%   Weight:   115.3 kg (254 lb 3.1 oz)    Height:         Body mass index is 45.03 kg/m². Weight: 115.3 kg (254 lb 3.1 oz)  Oxygen Therapy:  Pulse Oximetry: 100 %, O2 (LPM): 0, O2 Delivery: None (Room Air)    Physical Exam   Constitutional: She is oriented to person, place, and time and well-developed, well-nourished, and in no distress. No distress.   HENT:   Head: Normocephalic and atraumatic.   Mouth/Throat: Oropharynx is clear and moist.   Eyes: Pupils are equal, round, and reactive to light. Conjunctivae and EOM are normal.   Neck: No tracheal deviation present. No thyromegaly present.   Cardiovascular: Normal rate, regular rhythm and normal heart sounds.  Exam reveals no gallop and no friction rub.    No murmur heard.  Pulmonary/Chest: Effort normal. No respiratory distress. She has no wheezes.   Abdominal: Soft. Bowel sounds are normal. She exhibits no distension. There is no tenderness.   Musculoskeletal: Normal range of motion. She exhibits no edema.   Neurological: She is alert and oriented to person, place, and time.   Skin: Skin is warm and dry.   Psychiatric: Her mood appears anxious. She exhibits a depressed mood. She expresses no homicidal and no suicidal ideation.   Vitals reviewed.            Assessment/Plan     * Suicide attempt (HCC)- (present on admission)   Assessment & Plan    Patient overdosed with 20- 30 pills of long acting nifedipine.   has had prior history of suicidal ideations, multiple times she states  - Legal hold, 1:1 sitter  -pending inpatient psych transfer      Calcium channel blocker overdose-  (present on admission)   Assessment & Plan    overdose with 20-30 pills of 60 mg extended release nifedipine.  Was tachycardic with normal BP, became hypotensive which responded to IVF   Received IV glucagon, calcium gluconate and activated charcoal.  -D/C tele, as there have been no EKG changes, and half life of nifedipine is 2 hours.  - no EKG changes, normotensive, euglycemic   - Pressor,  if hypotensive, insulin for hyperglycemia.  - CT     Obesity   Assessment & Plan    - BMI 45  - defer counseling until after post-partum depression is stable.     Hypokalemia   Assessment & Plan    Serum K of 3.2 .  Currently normalized 3.9  Follow and replete accordingly     Post partum depression- (present on admission)   Assessment & Plan    3 months  post partum.  On zoloft and follows with a therapist, Leandra Shepherd.  Continue zoloft 100 mg       History of preeclampsia, severe, third trimester- (present on admission)   Assessment & Plan    Was started on nifedipine, post delivery persistent hypertension  - continue to hold, as she is normotensive     HTN (hypertension)- (present on admission)   Assessment & Plan     takes nifedipine 60 mg daily and labetalol 400 mg q. 8  - currently normotensive  -Continue to hold antihypertensive medication       Leighton Mckenna M.D.

## 2019-02-17 NOTE — PROGRESS NOTES
ICU transfer note     Attending:   Resident: Dr. Richardson  Date of admission: 2/14/2019  Date of transferring out from ICU : 2/16/2019.      Primary diagnosis:   Suicidal attempt by overdose of antihypertensives/postpartum depression on Zoloft.     ICU hospital course:  Patient was diagnosed with postpartum depression about 3 weeks after delivery and was started on Zoloft and psychotherapy, and presented to the emergency department after taking about 30 pills of nifedipine for the intent of killing herself, but presented to the emergency department of [facility] after she drove down after she regretted  the decision.  Patient states that the pregnancy was her first and states increased appetite and weight gain but denies any hallucinations or delusions at the time of admission.  Patient is being followed by psychiatry, after admission who diagnosed her with postpartum depression on Zoloft with suicidal attempt with the intention of killing herself due to acute stressors in her relationship and advised higher level of psychiatric care in an inpatient psychiatric hospital for severe postpartum depression, and have extended legal stay, after patient declines psychotherapy treatment, and transfer to inpatient psychiatric hospital after being medically cleared.  Patient was observed in the ICU after being treated with IV glucagon, calcium gluconate and activated charcoal and monitored with serial EKGs and correction of electrolytes, and has been found to be medically stable, to be transferred to medical floor on telemetry, pending acceptance at an inpatient psychiatry facility.    Important events / procedures in ICU:  Central venous line placement on 2/15/2019     Things to follow:   Will need a one-to-one sitter on the medical floor at all times  Continue psychotropic meds, Zoloft 100 mg p.o. daily for mood.  Transfer patient to  inpatient psychiatric hospital after medically cleared and acceptance  Psychiatry recommendations.    Labs and imaging need to continue :  - CBC: yes,   - BMP: yes,

## 2019-02-17 NOTE — PROGRESS NOTES
[facility] ICU Progress Note      Admit Date: 2/14/2019  ICU Day:1     Resident(s):  Attending: ELDER LOPEZ/ Dr. Booker     Date & Time:   2/16/2019   6:41 PM       Patient ID:    Name:             Flori Car   YOB: 1995  Age:                 23 y.o.  female   MRN:               [id number]    HPI:  24 yo female with a PMH of hypertension, post partum depression who presented with suicidal attempt with  drug overdose. Patient took 20 - 30 pills of  60mg nifedipine extended release ~ 8pm . She also took 8 oz of vodka.  Patient has a 3 months old baby and has post partum depression. She see a therapist for this and is on zoloft  100mg daily.  She states she has been having relationship problems with her boy friend which has worsened over the past month.  Patient admits to nausea but no vomiting at the time of presentation, denies any dizziness or syncopal episode.  Patient was admitted for account of hypotension and for monitoring.  Patient has been suicidal on multiple occasions     Consultants:  PMA: Dr. Cisneros/Dr Booker     Interval Events:  No acute overnight events, adequate urine output, states appetite is increasing.  Hemoglobin at 12.4 with a white blood cell count of 8.2.  Denies any anhedonia or hallucinations or delusions this am.  Psychiatry recommends inpatient psychiatric hospital admission.    Review of Systems   Constitutional: Negative for chills, fever, malaise/fatigue and weight loss.   HENT: Negative for congestion and sore throat.    Eyes: Negative for blurred vision, double vision and photophobia.   Respiratory: Negative for cough, sputum production, shortness of breath and wheezing.    Cardiovascular: Negative for chest pain, palpitations, orthopnea, claudication, leg swelling and PND.   Gastrointestinal: Negative for abdominal pain, blood in stool, constipation, diarrhea, melena, nausea and vomiting.   Genitourinary: Negative for dysuria.   Musculoskeletal:  "Negative for neck pain.   Skin: Negative for rash.   Neurological: Negative for dizziness, tingling, seizures, loss of consciousness and headaches.   Endo/Heme/Allergies: Does not bruise/bleed easily.   Psychiatric/Behavioral: Positive for depression and suicidal ideas. Negative for hallucinations, memory loss and substance abuse. The patient is not nervous/anxious and does not have insomnia.        PHYSICAL EXAM  Vitals:    02/16/19 0700 02/16/19 0800 02/16/19 0900 02/16/19 1600   BP:       Pulse: 99 (!) 103 (!) 103 (!) 104   Resp: 20 (!) 21 (!) 24 16   Temp:  36.8 °C (98.2 °F)  36.7 °C (98.1 °F)   TempSrc:       SpO2: 94% 94% 95% 96%   Weight:       Height:         Body mass index is 47.45 kg/m².  /58   Pulse (!) 104   Temp 36.7 °C (98.1 °F)   Resp 16   Ht 1.6 m (5' 3\")   Wt 121.5 kg (267 lb 13.7 oz)   SpO2 96%   Breastfeeding? No   BMI 47.45 kg/m²    O2 therapy: Pulse Oximetry: 96 %, O2 Delivery: None (Room Air)    Physical Exam  General: Young female, not in distress,  HEENT: Normocephalic, atraumatic, no jaundice or scleral icterus, no pallor, No cervical lymphadenopathy, no thyromegaly,  No tonsillar exudate, no throat erythyema,  PERLL, EOMI,   Respiratory:lungs clear to auscultation b/l, breath sounds vesicular, air entry adequate b/l,no wheezing, rales or crackles  Cardiac: Tachycardic, S1/S2 present, no M/R/G  GI: abdomen non distended, soft, non tender, no organomegaly, bowel sounds normoactive  Neuro: AAOX4, CN II-XII intact, sensation intact, strength 5/5 in all extremities,  Psychiatry: No agitation  Msk/Extremities: radial, dorsalis pedis pulses 2+b/l, no joint swelling or redness, ROM intact, no lower  extremity edema  Skin: No rash    Respiratory:     Respiration: 16, Pulse Oximetry: 96 %    Chest Tube Drains:          HemoDynamics:  Pulse: (!) 104, Heart Rate (Monitored): (!) 103 NIBP: 133/60      Neuro:      Fluids:    Date 02/16/19 0700 - 02/17/19 0659   Shift [id number] 6305-1453 " 9663-7454 24 Hour Total   I  N  T  A  K  E   P.O. 240   240      P.O. 240   240    Shift Total 240   240   O  U  T  P  U  T   Urine 425 300  725      Urine Void (mL) 425 300  725    Shift Total 425 300  725   NET -185 -300  -485        Intake/Output Summary (Last 24 hours) at 02/15/19 0847  Last data filed at 02/15/19 0653   Gross per 24 hour   Intake             2310 ml   Output              900 ml   Net             1410 ml          Body mass index is 47.45 kg/m².    Recent Labs      02/14/19   2300  02/15/19   0513  02/16/19   0523   SODIUM  138  135  133*   POTASSIUM  3.2*  3.8  3.6   CHLORIDE  107  107  106   CO2  21  20  22   BUN  17  14  11   CREATININE  0.84  0.66  0.64   MAGNESIUM   --   1.5  2.1   PHOSPHORUS   --   4.3  3.3   CALCIUM  9.0  9.3  8.8       GI/Nutrition:  Recent Labs      02/14/19   2300  02/15/19   0513  02/16/19   0523   ALTSGPT  29   --    --    ASTSGOT  23   --    --    ALKPHOSPHAT  72   --    --    TBILIRUBIN  0.3   --    --    GLUCOSE  112*  104*  95       Heme:  Recent Labs      02/14/19   2300  02/15/19   0513  02/16/19   0523   RBC  5.16  4.78  4.66   HEMOGLOBIN  13.9  13.0  12.4   HEMATOCRIT  44.3  40.7  39.5   PLATELETCT  380  331  307       Infectious Disease:  Temp  Av.1 °C (98.8 °F)  Min: 36.7 °C (98.1 °F)  Max: 37.7 °C (99.8 °F)  Recent Labs      02/14/19   2300  02/15/19   0513  02/16/19   0523   WBC  11.1*  13.8*  8.2   NEUTSPOLYS  53.50  70.00  67.30   LYMPHOCYTES  35.20  22.50  23.80   MONOCYTES  6.40  4.30  5.50   EOSINOPHILS  3.50  2.20  2.40   BASOPHILS  0.60  0.50  0.50   ASTSGOT  23   --    --    ALTSGPT  29   --    --    ALKPHOSPHAT  72   --    --    TBILIRUBIN  0.3   --    --        Meds:  • acetaminophen  650 mg     • enoxaparin  40 mg     • sertraline  100 mg     • fluticasone  1 Spray     • prenatal plus vitamin  1 Tab          Procedures:  [facility]     Imaging:  DX-CHEST-PORTABLE (1 VIEW)   Final Result      Right internal jugular catheter has been placed and  projects appropriately over the [facility]          Problem and Plan:    * Suicide attempt (HCC)- (present on admission)   Assessment & Plan    Patient overdosed with 20- 30 pills of long acting nifedipine.   has had prior history of suicidal ideations, multiple times she states  - Legal hold  -Psych consult     Calcium channel blocker overdose- (present on admission)   Assessment & Plan    overdose with 20-30 pills of 60 mg extended release nifedipine.  Was tachycardic with normal BP, became hypotensive which responded to IVF   Received IV glucagon, calcium gluconate and activated charcoal.  -Monitor with EKG q8 hours.   - Pressor,  if hypotensive, insulin for hyperglycemia.     Hypokalemia   Assessment & Plan    Serum K of 3.2 .  Currently normalized 3.6  Follow and replete accordingly     Post partum depression- (present on admission)   Assessment & Plan    3 months  post partum.  On zoloft and follows with a therapist, Leandra Shepherd.  Continue zoloft.       History of preeclampsia, severe, third trimester- (present on admission)   Assessment & Plan    Was started on nifedipine, post delivery persistent hypertension       HTN (hypertension)- (present on admission)   Assessment & Plan     takes nifedipine 60 mg daily and labetalol 400 mg q. 8  - Hold meds given overdose  -Blood pressure still low in the 110s,  -Continue to hold antihypertensive medication         DISPO: ICU, likely transfer of the floor today or tomorrow.  CODE STATUS: Full    Quality Measures:  Mcghee Catheter: None  DVT Prophylaxis: Enoxaparin  Ulcer Prophylaxis:: Not indicated  Antibiotics:: Not indicated  Lines:: Right IJ, PIV

## 2019-02-17 NOTE — PROGRESS NOTES
Patient transferred from ICU, bedside report received from ICU RNAshlee. Alert and oriented x4. No complaints noted at this time. Will continue to monitor.

## 2019-02-18 PROBLEM — E87.6 HYPOKALEMIA: Status: RESOLVED | Noted: 2019-02-15 | Resolved: 2019-02-18

## 2019-02-18 LAB
ANION GAP SERPL CALC-SCNC: 10 MMOL/L (ref 0–11.9)
BASOPHILS # BLD AUTO: 1 % (ref 0–1.8)
BASOPHILS # BLD: 0.08 K/UL (ref 0–0.12)
BUN SERPL-MCNC: 17 MG/DL (ref 8–22)
CALCIUM SERPL-MCNC: 9.6 MG/DL (ref 8.5–10.5)
CHLORIDE SERPL-SCNC: 106 MMOL/L (ref 96–112)
CO2 SERPL-SCNC: 21 MMOL/L (ref 20–33)
CREAT SERPL-MCNC: 0.64 MG/DL (ref 0.5–1.4)
EKG IMPRESSION: NORMAL
EOSINOPHIL # BLD AUTO: 0.26 K/UL (ref 0–0.51)
EOSINOPHIL NFR BLD: 3.3 % (ref 0–6.9)
ERYTHROCYTE [DISTWIDTH] IN BLOOD BY AUTOMATED COUNT: 43 FL (ref 35.9–50)
GLUCOSE SERPL-MCNC: 91 MG/DL (ref 65–99)
HCT VFR BLD AUTO: 40.8 % (ref 37–47)
HGB BLD-MCNC: 13 G/DL (ref 12–16)
IMM GRANULOCYTES # BLD AUTO: 0.06 K/UL (ref 0–0.11)
IMM GRANULOCYTES NFR BLD AUTO: 0.8 % (ref 0–0.9)
LYMPHOCYTES # BLD AUTO: 2.19 K/UL (ref 1–4.8)
LYMPHOCYTES NFR BLD: 28 % (ref 22–41)
MCH RBC QN AUTO: 27.3 PG (ref 27–33)
MCHC RBC AUTO-ENTMCNC: 31.9 G/DL (ref 33.6–35)
MCV RBC AUTO: 85.7 FL (ref 81.4–97.8)
MONOCYTES # BLD AUTO: 0.68 K/UL (ref 0–0.85)
MONOCYTES NFR BLD AUTO: 8.7 % (ref 0–13.4)
NEUTROPHILS # BLD AUTO: 4.55 K/UL (ref 2–7.15)
NEUTROPHILS NFR BLD: 58.2 % (ref 44–72)
NRBC # BLD AUTO: 0 K/UL
NRBC BLD-RTO: 0 /100 WBC
PLATELET # BLD AUTO: 303 K/UL (ref 164–446)
PMV BLD AUTO: 9 FL (ref 9–12.9)
POTASSIUM SERPL-SCNC: 3.9 MMOL/L (ref 3.6–5.5)
RBC # BLD AUTO: 4.76 M/UL (ref 4.2–5.4)
SODIUM SERPL-SCNC: 137 MMOL/L (ref 135–145)
WBC # BLD AUTO: 7.8 K/UL (ref 4.8–10.8)

## 2019-02-18 PROCEDURE — 99232 SBSQ HOSP IP/OBS MODERATE 35: CPT | Performed by: PSYCHIATRY & NEUROLOGY

## 2019-02-18 PROCEDURE — 770001 HCHG ROOM/CARE - MED/SURG/GYN PRIV*

## 2019-02-18 PROCEDURE — 93010 ELECTROCARDIOGRAM REPORT: CPT | Performed by: INTERNAL MEDICINE

## 2019-02-18 PROCEDURE — A9270 NON-COVERED ITEM OR SERVICE: HCPCS | Performed by: INTERNAL MEDICINE

## 2019-02-18 PROCEDURE — 93005 ELECTROCARDIOGRAM TRACING: CPT | Performed by: STUDENT IN AN ORGANIZED HEALTH CARE EDUCATION/TRAINING PROGRAM

## 2019-02-18 PROCEDURE — 700102 HCHG RX REV CODE 250 W/ 637 OVERRIDE(OP): Performed by: INTERNAL MEDICINE

## 2019-02-18 PROCEDURE — 80048 BASIC METABOLIC PNL TOTAL CA: CPT

## 2019-02-18 PROCEDURE — 85025 COMPLETE CBC W/AUTO DIFF WBC: CPT

## 2019-02-18 PROCEDURE — 36415 COLL VENOUS BLD VENIPUNCTURE: CPT

## 2019-02-18 RX ADMIN — FLUTICASONE PROPIONATE 50 MCG: 50 SPRAY, METERED NASAL at 05:42

## 2019-02-18 RX ADMIN — SERTRALINE 100 MG: 50 TABLET, FILM COATED ORAL at 05:42

## 2019-02-18 NOTE — DISCHARGE PLANNING
MSW received call from Mary at [facility]. They are declining pt at this time as pt is not stating she is suicidal. If pt is re-evaluated and stated she is SI-they request we resend referral and they will review it again.

## 2019-02-18 NOTE — CARE PLAN
Problem: Infection  Goal: Will remain free from infection  Outcome: PROGRESSING AS EXPECTED  No signs or symptoms of infection noted    Problem: Psychosocial Needs:  Goal: Level of anxiety will decrease  Outcome: PROGRESSING AS EXPECTED  Patient resting comfortably, does not appear anxious or in distress

## 2019-02-18 NOTE — ASSESSMENT & PLAN NOTE
3 months  post partum.  On zoloft and follows with therapist Leandra Shepherd as an outpatient.    Plan:  Psych consulted, appreciate recs   Continue zoloft 100 mg

## 2019-02-18 NOTE — ASSESSMENT & PLAN NOTE
Was started on nifedipine post delivery for persistent hypertension.    Plan:  Continue to hold Nifedipine as normotensive

## 2019-02-18 NOTE — CARE PLAN
Problem: Communication  Goal: The ability to communicate needs accurately and effectively will improve  Outcome: PROGRESSING AS EXPECTED  Pt oriented and able to use call light.     Problem: Safety  Goal: Will remain free from injury  Outcome: PROGRESSING AS EXPECTED  Pt AOx4, pleasant and cooperative. Pt calls out appropriately for assistance to restroom. Pt ambulatory with stand by assist.    Problem: Psychosocial Needs:  Goal: Level of anxiety will decrease  Outcome: PROGRESSING AS EXPECTED  Pt has no c/o anxiety at this time.

## 2019-02-18 NOTE — PSYCHIATRY
"PSYCHIATRIC FOLLOW-UP:(established)    *Reason for admission:  Suicide attempt by OD on 30 pills of nifedipine   *Reason for consult: suicidal attempt by OD on 3- pills of nifedipine   *Requesting Physician:Lit Mccormack MD  Legal status:  extended        *HPI:   Pt was found awake, chatting with the sitter. Pt reported she is doing good and denied any depressive feelings of anhedonia. She has spent her time doing puzzles, and watching TV> She was happy to say that her daughter came over the weekend, and states that she feels comfortable with her partner's mother taking care of her. PT reported good sleep, appetite, energy and concentration. She denied any SI/HI, and stated that to prevent another suicide attempt, she will request more hours of therapy and talk to her family more frequently about her feelings. Pt denied any AVH, paranoia or delusions. She is planning to do some origami this afternoon. Pt is interested in starting inpatient psychotherapy.          Medical ROS (as pertinent): denies HA, vision changes, denies CP, SOB, denies abdominal pain, no constipation diarrhea, N/V, fatigue or physical pain.                        *Psychiatric Examination:  Vitals:   Vitals:    02/18/19 0715   BP: 150/72   Pulse: 69   Resp: 18   Temp: 36.4 °C (97.5 °F)   SpO2: 96%       Appearance: appears stated, age, fair grooming and hygiene, wearing hospital gown, lying down in bed, chatting with her sitter, with puzzles on her table  Behavior is calm and cooperative  Good eye contact  Muscle Strength/Tone: no psychomotor retardation or agitation  Gait/Station:not tested  Speech:normal volume, normal rhythm and tone  Thought Process:linear and goal directed  Abnormal/Psychotic Thoughts (ex):denies any AVH, paranoia or delusions  Insight/Judgement:fair/fair  Orientation:grossly oriented  Memory:fair  Attention/Concentration:intact  Language:fluent in English   Mood: \"I am going well, better\"  Affect: full range, appropriate    "   SI/HI:   Denies any SI/HI     Current Facility-Administered Medications   Medication Dose Route Frequency Provider Last Rate Last Dose   • acetaminophen (TYLENOL) tablet 650 mg  650 mg Oral Q6HRS PRN Anna Martinez M.D.       • enoxaparin (LOVENOX) inj 40 mg  40 mg Subcutaneous DAILY Anna Martinez M.D.       • sertraline (ZOLOFT) tablet 100 mg  100 mg Oral DAILY Iqra Andrea Jr., D.O.   100 mg at 19 0542   • fluticasone (FLONASE) nasal spray 50 mcg  1 Spray Nasal DAILY Iqra Andrea Jr., D.O.   50 mcg at 19 0542   • prenatal plus vitamin (STUARTNATAL 1+1) 27-1 MG tablet 1 Tab  1 Tab Oral QAM Iqra Andrea Jr., D.O.         Recent Results (from the past 48 hour(s))   TSH    Collection Time: 19 11:40 AM   Result Value Ref Range    TSH 1.980 0.380 - 5.330 uIU/mL   FREE THYROXINE    Collection Time: 19 11:40 AM   Result Value Ref Range    Free T-4 0.75 0.53 - 1.43 ng/dL   EKG    Collection Time: 19  2:16 PM   Result Value Ref Range    Report       [facility]    Test Date:  2019  Pt Name:    JARETH LUO                Department: [facility]  MRN:        [id number]                      Room:       [facility]  Gender:     Female                       Technician: [facility]  :        1995                   Requested By:IQRA ANDREA JR  Order #:    [id number]                    Roxana MD: Aren Rudolph MD    Measurements  Intervals                                Axis  Rate:       98                           P:          33  KS:         120                          QRS:        20  QRSD:       78                           T:          3  QT:         348  QTc:        445    Interpretive Statements  SINUS RHYTHM  Compared to ECG 2019 06:35:58  NO SIGNIFICANT CHANGES  Electronically Signed On 2019 20:36:22 PST by Aren Rudolph MD     EKG    Collection Time: 19 10:54 PM   Result Value Ref Range    Report       [facility]    Test Date:   2019  Pt Name:    JARETH LUO                Department: [facility]  MRN:        [id number]                      Room:       T709  Gender:     Female                       Technician: SONJA  :        1995                   Requested By:IQRA ANDREA JR  Order #:    [id number]                    Reading MD: Aren Rudolph MD    Measurements  Intervals                                Axis  Rate:       91                           P:          0  ID:         132                          QRS:        0  QRSD:       78                           T:          0  QT:         344  QTc:        424    Interpretive Statements  SINUS RHYTHM  Compared to ECG 2019 14:16:27  No significant changes    Electronically Signed On 2019 2:13:29 PST by Aren Rudolph MD     MAGNESIUM    Collection Time: 19  4:44 AM   Result Value Ref Range    Magnesium 2.0 1.5 - 2.5 mg/dL   CBC WITH DIFFERENTIAL    Collection Time: 19  4:44 AM   Result Value Ref Range    WBC 8.7 4.8 - 10.8 K/uL    RBC 4.67 4.20 - 5.40 M/uL    Hemoglobin 12.8 12.0 - 16.0 g/dL    Hematocrit 39.9 37.0 - 47.0 %    MCV 85.4 81.4 - 97.8 fL    MCH 27.4 27.0 - 33.0 pg    MCHC 32.1 (L) 33.6 - 35.0 g/dL    RDW 42.4 35.9 - 50.0 fL    Platelet Count 314 164 - 446 K/uL    MPV 8.9 (L) 9.0 - 12.9 fL    Neutrophils-Polys 59.30 44.00 - 72.00 %    Lymphocytes 29.90 22.00 - 41.00 %    Monocytes 6.80 0.00 - 13.40 %    Eosinophils 3.00 0.00 - 6.90 %    Basophils 0.70 0.00 - 1.80 %    Immature Granulocytes 0.30 0.00 - 0.90 %    Nucleated RBC 0.00 /100 WBC    Neutrophils (Absolute) 5.19 2.00 - 7.15 K/uL    Lymphs (Absolute) 2.61 1.00 - 4.80 K/uL    Monos (Absolute) 0.59 0.00 - 0.85 K/uL    Eos (Absolute) 0.26 0.00 - 0.51 K/uL    Baso (Absolute) 0.06 0.00 - 0.12 K/uL    Immature Granulocytes (abs) 0.03 0.00 - 0.11 K/uL    NRBC (Absolute) 0.00 K/uL   Basic Metabolic Panel    Collection Time: 19  4:44 AM   Result Value Ref Range    Sodium 138 135 - 145  mmol/L    Potassium 3.9 3.6 - 5.5 mmol/L    Chloride 107 96 - 112 mmol/L    Co2 23 20 - 33 mmol/L    Glucose 92 65 - 99 mg/dL    Bun 12 8 - 22 mg/dL    Creatinine 0.66 0.50 - 1.40 mg/dL    Calcium 9.4 8.5 - 10.5 mg/dL    Anion Gap 8.0 0.0 - 11.9   Phosphorus    Collection Time: 19  4:44 AM   Result Value Ref Range    Phosphorus 3.8 2.5 - 4.5 mg/dL   ESTIMATED GFR    Collection Time: 19  4:44 AM   Result Value Ref Range    GFR If African American >60 >60 mL/min/1.73 m 2    GFR If Non African American >60 >60 mL/min/1.73 m 2   EKG    Collection Time: 19  6:05 AM   Result Value Ref Range    Report       [facility]    Test Date:  2019  Pt Name:    JARETH LUO                Department: [facility]  MRN:        [id number]                      Room:       [facility]  Gender:     Female                       Technician: SONJA  :        1995                   Requested By:IQRA ANDREA JR  Order #:    [id number]                    Reading MD: Geetha Peterson MD    Measurements  Intervals                                Axis  Rate:       80                           P:          30  LA:         124                          QRS:        26  QRSD:       82                           T:          -7  QT:         372  QTc:        430    Interpretive Statements  SINUS RHYTHM  INFERIOR Q WAVES, PROBABLY NORMAL VARIATION  BORDERLINE T ABNORMALITIES, INFERIOR LEADS  Compared to ECG 2019 22:54:46  NO SIGNFICANT CHANGE    Electronically Signed On 2019 16:37:56 PST by Geetha Peterson MD     CBC WITH DIFFERENTIAL    Collection Time: 19  2:39 AM   Result Value Ref Range    WBC 7.8 4.8 - 10.8 K/uL    RBC 4.76 4.20 - 5.40 M/uL    Hemoglobin 13.0 12.0 - 16.0 g/dL    Hematocrit 40.8 37.0 - 47.0 %    MCV 85.7 81.4 - 97.8 fL    MCH 27.3 27.0 - 33.0 pg    MCHC 31.9 (L) 33.6 - 35.0 g/dL    RDW 43.0 35.9 - 50.0 fL    Platelet Count 303 164 - 446 K/uL    MPV 9.0 9.0 - 12.9 fL    Neutrophils-Polys  58.20 44.00 - 72.00 %    Lymphocytes 28.00 22.00 - 41.00 %    Monocytes 8.70 0.00 - 13.40 %    Eosinophils 3.30 0.00 - 6.90 %    Basophils 1.00 0.00 - 1.80 %    Immature Granulocytes 0.80 0.00 - 0.90 %    Nucleated RBC 0.00 /100 WBC    Neutrophils (Absolute) 4.55 2.00 - 7.15 K/uL    Lymphs (Absolute) 2.19 1.00 - 4.80 K/uL    Monos (Absolute) 0.68 0.00 - 0.85 K/uL    Eos (Absolute) 0.26 0.00 - 0.51 K/uL    Baso (Absolute) 0.08 0.00 - 0.12 K/uL    Immature Granulocytes (abs) 0.06 0.00 - 0.11 K/uL    NRBC (Absolute) 0.00 K/uL   Basic Metabolic Panel    Collection Time: 19  2:39 AM   Result Value Ref Range    Sodium 137 135 - 145 mmol/L    Potassium 3.9 3.6 - 5.5 mmol/L    Chloride 106 96 - 112 mmol/L    Co2 21 20 - 33 mmol/L    Glucose 91 65 - 99 mg/dL    Bun 17 8 - 22 mg/dL    Creatinine 0.64 0.50 - 1.40 mg/dL    Calcium 9.6 8.5 - 10.5 mg/dL    Anion Gap 10.0 0.0 - 11.9   ESTIMATED GFR    Collection Time: 19  2:39 AM   Result Value Ref Range    GFR If African American >60 >60 mL/min/1.73 m 2    GFR If Non African American >60 >60 mL/min/1.73 m 2   EKG    Collection Time: 19  4:56 AM   Result Value Ref Range    Report       [facility]    Test Date:  2019  Pt Name:    JARETH LUO                Department: 171  MRN:        [id number]                      Room:       T709  Gender:     Female                       Technician: SONJA  :        1995                   Requested By:SUSANNA DEGROOT  Order #:    [id number]                    Reading MD: Estephanie Cano MD    Measurements  Intervals                                Axis  Rate:       66                           P:          31  NH:         132                          QRS:        44  QRSD:       84                           T:          18  QT:         400  QTc:        420    Interpretive Statements  SINUS RHYTHM  Compared to ECG 2019 06:05:18  No significant change noted  Electronically Signed On 2019 7:55:23 PST by Estephanie  Jerome RONQUILLO           ASSESSMENT: Pt's mood and affect have improved, however pt has significant risk factors, and continue to be at elevated risk of danger to self. Pt agreed with starting psychotherapy treatment to improve copying skills.     Post partum depression, severe        PLAN:  Legal status: extended  Psychotropic meds: continue zoloft 100mg PO daily for mood   Will refer patient to psychotherapy treatment  Please transfer pt to inpatient psychiatric hospital when medically cleared  Will follow  Thank you for the consult.

## 2019-02-18 NOTE — PROGRESS NOTES
Bedside report received from day shift RN. Patient awake, alert and oriented x 4. Sitting up in bed eating dinner. On room air. No complaints noted at this time. 1:1 sitter at bedside. Will continue to monitor.

## 2019-02-18 NOTE — ASSESSMENT & PLAN NOTE
Patient overdosed with 20- 30 pills of long acting nifedipine. Reports prior history of suicidal ideation multiple times. Currently with post partum depression. Psych following, appreciate recs.    Plan:   Legal hold extended by Psych on 2/18/19  Continuing 1:1 sitter  Medically clear for discharge to inpatient Psych facility, awaiting placement

## 2019-02-18 NOTE — PROGRESS NOTES
Internal Medicine Interval Note  Note Author: Khushi Rutledge M.D.     Name Flori Car    1995   Sex female   MRN [id number]   Code Status Full Code     After 5PM or if no immediate response to page, please call for cross-coverage    Attending: Dr. Venegas  Team: Red See patient list for primary contact information  Call [phone] to page    1st Call:  Dr. Rutledge - Violeta Intern, R1 2nd Call:   Dr. Bernadette Mcdaniel Sr. Resident, R3     Admission Date   2019    Principal Problem  Presented with intentional overdose with home nifedipine in the setting of post partum depression.    Interval History  Reports doing well, denies SI and HI today, became tearful during the interview while explaining her reason for admission and history of depression, denies f, c, n, v, CP, SOB, diarrhea, constipation, dysuria, hematuria, abnormal bleeding or bruising.  Psych saw the patient today per chart review and extended her legal hold.   Called Social Work and left a message asking for updates.     Disposition  Inpatient psych facility, pending placement     Consultants  Psychiatry    PCP  Sena Alonso M.D.    Quality Measures  Mcghee catheter: No  DVT prophylaxis: Yes  Reviewed items: Labs, Medications, EKG and Imaging    Vitals   19 1547 19 2000 19 0400 19 0715   BP: 147/83 137/74 137/70 150/72   Pulse: 72 98 66 69   Resp: 18    Temp: 36.9 °C (98.5 °F) 36.7 °C (98.1 °F) 36.4 °C (97.6 °F) 36.4 °C (97.5 °F)   TempSrc: Temporal Temporal Temporal Temporal   SpO2: 96% 95% 95% 96%   Weight:       Height:         Physical Exam  General: No apparent distress, pleasant, cooperative  HEN: Normocephalic, atraumatic, nose and bilateral ears normal   Eyes: EOMI, sclerae anicteric, normal conjunctivae  Throat: Oropharynx clear, moist oral mucosa  Neck: Supple, no lymphadenopathy, no thyromegaly  Lungs: Clear to auscultation bilaterally, no wheezes, rales or rhonchi  Heart: Normal rate, regular  rhythm, no murmurs, rubs or gallops  Abdomen: Soft, obese, normoactive BS, nontender, no rebound or guarding  Extremities: No clubbing, no cyanosis, no edema  Neuro: Cranial nerves grossly intact, no focal neurological deficits  Skin: Warm, dry, intact, no suspicious rashes or lesions  Psych: Labile mood and affect    Labs  Recent Labs      02/16/19 0523 02/17/19 0444 02/18/19   0239   SODIUM  133*  138  137   POTASSIUM  3.6  3.9  3.9   CHLORIDE  106  107  106   CO2  22  23  21   BUN  11  12  17   CREATININE  0.64  0.66  0.64   MAGNESIUM  2.1  2.0   --    PHOSPHORUS  3.3  3.8   --    CALCIUM  8.8  9.4  9.6     Recent Labs      02/16/19 0523 02/17/19 0444 02/18/19   0239   GLUCOSE  95  92  91     Recent Labs      02/16/19 0523 02/17/19 0444 02/18/19 0239   RBC  4.66  4.67  4.76   HEMOGLOBIN  12.4  12.8  13.0   HEMATOCRIT  39.5  39.9  40.8   PLATELETCT  307  314  303     Recent Labs      02/16/19 0523 02/17/19 0444 02/18/19   0239   WBC  8.2  8.7  7.8   NEUTSPOLYS  67.30  59.30  58.20   LYMPHOCYTES  23.80  29.90  28.00   MONOCYTES  5.50  6.80  8.70   EOSINOPHILS  2.40  3.00  3.30   BASOPHILS  0.50  0.70  1.00       Assessment and Plan    Suicide attempt (HCC) - present on admission  Patient overdosed with 20- 30 pills of long acting nifedipine. Reports prior history of suicidal ideation multiple times. Currently with post partum depression. Psych following, appreciate recs.    Plan:   Legal hold extended by Psych on 2/18/19  Continuing 1:1 sitter  Medically clear for discharge to inpatient Psych facility, awaiting placement     Calcium channel blocker overdose - present on admission  Overdosed with 20-30 pills of 60 mg extended release nifedipine. Was tachycardic and hypotensive in the ED, responded to IVF, also received IV glucagon, calcium gluconate and activated charcoal.   Has been stable on the floor and is awaiting placement for discharge to an inpatient Psych facility.    Plan:  Legal  hold extended by Psych on 2/18/19  Continuing 1:1 sitter  Medically clear for transfer to inpatient Psych facility, awaiting placement by Social Work    Post partum depression - present on admission  3 months  post partum.  On zoloft and follows with therapist Leandra Shepherd as an outpatient.    Plan:  Psych consulted, appreciate recs   Continue zoloft 100 mg    History of preeclampsia, severe, third trimester - present on admission  Was started on nifedipine post delivery for persistent hypertension.    Plan:  Continue to hold Nifedipine as normotensive    HTN (hypertension) - present on admission  Remains normotensive, continue to hold nifedipine while normotensive    Obesity - present on admission  BMI 45, recommend dietary counseling once post-partum depression better managed

## 2019-02-19 ENCOUNTER — APPOINTMENT (OUTPATIENT)
Dept: BEHAVIORAL HEALTH | Facility: CLINIC | Age: 24
End: 2019-02-19

## 2019-02-19 LAB
ANION GAP SERPL CALC-SCNC: 10 MMOL/L (ref 0–11.9)
BASOPHILS # BLD AUTO: 0.5 % (ref 0–1.8)
BASOPHILS # BLD: 0.04 K/UL (ref 0–0.12)
BUN SERPL-MCNC: 17 MG/DL (ref 8–22)
CALCIUM SERPL-MCNC: 9.2 MG/DL (ref 8.5–10.5)
CHLORIDE SERPL-SCNC: 104 MMOL/L (ref 96–112)
CO2 SERPL-SCNC: 22 MMOL/L (ref 20–33)
CREAT SERPL-MCNC: 0.7 MG/DL (ref 0.5–1.4)
EKG IMPRESSION: NORMAL
EOSINOPHIL # BLD AUTO: 0.27 K/UL (ref 0–0.51)
EOSINOPHIL NFR BLD: 3.3 % (ref 0–6.9)
ERYTHROCYTE [DISTWIDTH] IN BLOOD BY AUTOMATED COUNT: 41.7 FL (ref 35.9–50)
GLUCOSE SERPL-MCNC: 89 MG/DL (ref 65–99)
HCT VFR BLD AUTO: 41 % (ref 37–47)
HGB BLD-MCNC: 12.9 G/DL (ref 12–16)
IMM GRANULOCYTES # BLD AUTO: 0.04 K/UL (ref 0–0.11)
IMM GRANULOCYTES NFR BLD AUTO: 0.5 % (ref 0–0.9)
LYMPHOCYTES # BLD AUTO: 2.61 K/UL (ref 1–4.8)
LYMPHOCYTES NFR BLD: 31.8 % (ref 22–41)
MAGNESIUM SERPL-MCNC: 1.8 MG/DL (ref 1.5–2.5)
MCH RBC QN AUTO: 26.9 PG (ref 27–33)
MCHC RBC AUTO-ENTMCNC: 31.5 G/DL (ref 33.6–35)
MCV RBC AUTO: 85.4 FL (ref 81.4–97.8)
MONOCYTES # BLD AUTO: 0.75 K/UL (ref 0–0.85)
MONOCYTES NFR BLD AUTO: 9.1 % (ref 0–13.4)
NEUTROPHILS # BLD AUTO: 4.49 K/UL (ref 2–7.15)
NEUTROPHILS NFR BLD: 54.8 % (ref 44–72)
NRBC # BLD AUTO: 0 K/UL
NRBC BLD-RTO: 0 /100 WBC
PHOSPHATE SERPL-MCNC: 5.1 MG/DL (ref 2.5–4.5)
PLATELET # BLD AUTO: 316 K/UL (ref 164–446)
PMV BLD AUTO: 8.9 FL (ref 9–12.9)
POTASSIUM SERPL-SCNC: 4.1 MMOL/L (ref 3.6–5.5)
RBC # BLD AUTO: 4.8 M/UL (ref 4.2–5.4)
SODIUM SERPL-SCNC: 136 MMOL/L (ref 135–145)
WBC # BLD AUTO: 8.2 K/UL (ref 4.8–10.8)

## 2019-02-19 PROCEDURE — 84100 ASSAY OF PHOSPHORUS: CPT

## 2019-02-19 PROCEDURE — 36415 COLL VENOUS BLD VENIPUNCTURE: CPT

## 2019-02-19 PROCEDURE — 85025 COMPLETE CBC W/AUTO DIFF WBC: CPT

## 2019-02-19 PROCEDURE — 93010 ELECTROCARDIOGRAM REPORT: CPT | Performed by: INTERNAL MEDICINE

## 2019-02-19 PROCEDURE — A9270 NON-COVERED ITEM OR SERVICE: HCPCS | Performed by: INTERNAL MEDICINE

## 2019-02-19 PROCEDURE — 83735 ASSAY OF MAGNESIUM: CPT

## 2019-02-19 PROCEDURE — 93005 ELECTROCARDIOGRAM TRACING: CPT | Performed by: STUDENT IN AN ORGANIZED HEALTH CARE EDUCATION/TRAINING PROGRAM

## 2019-02-19 PROCEDURE — 99231 SBSQ HOSP IP/OBS SF/LOW 25: CPT | Mod: GC | Performed by: STUDENT IN AN ORGANIZED HEALTH CARE EDUCATION/TRAINING PROGRAM

## 2019-02-19 PROCEDURE — 80048 BASIC METABOLIC PNL TOTAL CA: CPT

## 2019-02-19 PROCEDURE — 770001 HCHG ROOM/CARE - MED/SURG/GYN PRIV*

## 2019-02-19 PROCEDURE — 700102 HCHG RX REV CODE 250 W/ 637 OVERRIDE(OP): Performed by: INTERNAL MEDICINE

## 2019-02-19 RX ADMIN — SERTRALINE 100 MG: 50 TABLET, FILM COATED ORAL at 05:10

## 2019-02-19 RX ADMIN — FLUTICASONE PROPIONATE 50 MCG: 50 SPRAY, METERED NASAL at 09:50

## 2019-02-19 NOTE — DISCHARGE PLANNING
Pt was medically cleared today, remains on legal hold for SI.  Isis MISHRA/RN , called to discuss discharge plan with DANIELA Best/CM.    Per Sherin Best has sent referrals to inpatient psych. Will follow for decision of legal court, to determine POC.

## 2019-02-19 NOTE — DISCHARGE PLANNING
Legal Hold    Referral: Legal Hold Court     Intervention: Pt presented for legal hold meeting with .  advised pt will meet with court MD's via telemedicine monitor to contest the legal hold.      Plan: Pt will present to telemedicine mental health to meet with court physicians 2/20. Will call bedside RN once time has been determined

## 2019-02-19 NOTE — PROGRESS NOTES
Internal Medicine Interval Note  Note Author: Khushi Rutledge M.D.     Name Flori Car    1995   Sex female   MRN [id number]   Code Status Full     After 5PM or if no immediate response to page, please call for cross-coverage    Attending: Dr. Venegas  Team: Red See patient list for primary contact information  Call [phone] to page    1st Call:  Dr. Rutledge - Violeta Intern, R1 2nd Call:   Dr. Bernadette Mcdaniel Sr. Resident, R3     Admission Date   2019    LOS  4    Principal Problem  Presented with intentional overdose with home nifedipine in the setting of post partum depression.     Interval History  No acute events overnight, still with one-on-one sitter, reports doing well, denies SI and HI, working on [employer] art, denies fevers, chills, nausea, vomiting, CP, SOB, bowel or bladder dysfunction and abnormal bleeding or bruising.     Disposition  Inpatient psych facility, pending placement      Consultants  Psychiatry     PCP  Sena Alonso M.D.     Quality Measures  Mcghee catheter: No  DVT prophylaxis: Yes  Reviewed items: Labs, Medications, EKG and Imaging    Vitals  Vitals:    19 1500 19 2000 19 0327 19 0740   BP: 147/77 138/81 113/64 121/85   Pulse: 92 91 73 78   Resp:    Temp: 36.6 °C (97.8 °F) 37.3 °C (99.1 °F) 36.7 °C (98.1 °F) 37.1 °C (98.7 °F)   TempSrc: Temporal Temporal Temporal Temporal   SpO2: 95% 96% 94% 96%   Weight:  114.4 kg (252 lb 3.3 oz)     Height:           Physical Exam  General: No apparent distress, pleasant, cooperative  HEN: Normocephalic, atraumatic, nose and bilateral ears normal   Eyes: EOMI, sclerae anicteric, normal conjunctivae  Throat: Oropharynx clear, moist oral mucosa  Neck: Supple, no lymphadenopathy, no thyromegaly  Lungs: Clear to auscultation bilaterally, no wheezes, rales or rhonchi  Heart: Normal rate, regular rhythm, no murmurs, rubs or gallops  Abdomen: Soft, obese, normoactive BS, nontender, no rebound or  guarding  Extremities: No clubbing, no cyanosis, no edema  Neuro: Cranial nerves grossly intact, no focal neurological deficits  Skin: Warm, dry, intact, no suspicious rashes or lesions  Psych: Normal mood, flat affect      Labs  Recent Labs      02/17/19 0444 02/18/19 0239 02/19/19   0245   SODIUM  138  137  136   POTASSIUM  3.9  3.9  4.1   CHLORIDE  107  106  104   CO2  23  21  22   BUN  12 17  17   CREATININE  0.66  0.64  0.70   MAGNESIUM  2.0   --   1.8   PHOSPHORUS  3.8   --   5.1*   CALCIUM  9.4  9.6  9.2     Recent Labs      02/17/19 0444 02/18/19 0239 02/19/19   0245   GLUCOSE  92  91  89     Recent Labs      02/17/19 0444 02/18/19 0239 02/19/19   0245   RBC  4.67  4.76  4.80   HEMOGLOBIN  12.8  13.0  12.9   HEMATOCRIT  39.9  40.8  41.0   PLATELETCT  314  303  316     Recent Labs      02/17/19 0444 02/18/19 0239 02/19/19   0245   WBC  8.7  7.8  8.2   NEUTSPOLYS  59.30  58.20  54.80   LYMPHOCYTES  29.90  28.00  31.80   MONOCYTES  6.80  8.70  9.10   EOSINOPHILS  3.00  3.30  3.30   BASOPHILS  0.70  1.00  0.50       Assessment and Plan    * Suicide attempt (HCC)- (present on admission)   Assessment & Plan    Patient overdosed with 20-30 pills of long acting nifedipine. Reports prior history of suicidal ideation multiple times. Currently with postpartum depression. Psych following, appreciate recs.     Plan:   Legal hold extended by Psych on 2/18/19  Continue 1:1 sitter  Medically clear for discharge to inpatient Psych facility, awaiting placement      Calcium channel blocker overdose- (present on admission)   Assessment & Plan    Overdosed with 20-30 pills of 60 mg extended release nifedipine. Was tachycardic and hypotensive in the ED, responded to IVF, also received IV glucagon, calcium gluconate and activated charcoal.   Has been stable on the floor and is awaiting placement for discharge to an inpatient Psych facility.     Plan:  Legal hold extended by Psych on 2/18/19  Continuing 1:1  sitter  Medically clear for transfer to inpatient Psych facility, awaiting placement by Social Work     Obesity   Assessment & Plan    BMI 45    Plan:  Recommend dietary counseling once post-partum depression better managed     Post partum depression- (present on admission)   Assessment & Plan    3 months postpartum.  On Zoloft, follows with therapist Leandra Shepherd as an outpatient.    Plan:  Continue Zoloft 100 mg daily     History of preeclampsia, severe, third trimester- (present on admission)   Assessment & Plan    Was started on nifedipine post-delivery for persistent hypertension.     Plan:  Continue to hold Nifedipine as normotensive     HTN (hypertension)- (present on admission)   Assessment & Plan    Has been normotensive off nifedipine during this admission    Plan:  Continue to hold nifedipine while normotensive

## 2019-02-19 NOTE — CARE PLAN
Problem: Safety  Goal: Will remain free from injury  Outcome: PROGRESSING AS EXPECTED  Fall precautions in place. Bed wheels locked, bed is in the lowest position. Call light in reach per order. Non-skid socks provided. Patient provides successful verbalization of fall and safety precautions and demonstration of use of call bell. Upper side rails are up. Hourly rounding in progress. 1:1 Sitter at bedside.       Problem: Knowledge Deficit  Goal: Knowledge of disease process/condition, treatment plan, diagnostic tests, and medications will improve  Outcome: PROGRESSING AS EXPECTED  POC discussed with the patient. All questions and concerns addressed and answered. Patient is involved in POC and verbalizes the understanding of the disease process, medications, tests and treatments. Questions on POC encouraged throughout care.

## 2019-02-19 NOTE — PROGRESS NOTES
Received bedside report from PM nurse. Assumed patient care. Chart reviewed. Pt was resting in bed. A&O x 4. No concerns, complaints or distress. Patient denies pain. Sitter at bedside at all times. POC updated with pt and on the patient communication board. Bed locked and in the lowest position. Call light within reach. Medical status. Will continue to monitor.

## 2019-02-19 NOTE — PROGRESS NOTES
Bedside report received from day RN. Assumed pt care. Pt a&ox4. POC discussed. Pt declines any needs at this time. Sitter Ramses at bedside. Only allowed items in room per order. Bed locked and in lowest position. Call light within reach. Hourly rounding in place.

## 2019-02-19 NOTE — CARE PLAN
Problem: Safety  Goal: Will remain free from injury  Fall precautions in place. Bed wheels locked, bed is in the lowest position. Call light in reach. Bed alarm on and in place. Non-skid socks provided. Patient provides successful verbalization of fall and safety precautions and demonstration of use of call bell. Upper side rails are up. Hourly rounding in progress.     Problem: Knowledge Deficit  Goal: Knowledge of disease process/condition, treatment plan, diagnostic tests, and medications will improve  POC discussed with the patient. All questions and concerns addressed and answered. Patient is involved in POC and verbalizes the understanding of the disease process, medications, tests and treatments. Questions on POC encouraged throughout care.       Problem: Psychosocial Needs:  Goal: Level of anxiety will decrease  Anxiety triggers identified. Calming techniques provided to patient. Patient is involved in POC and is encouraged to verbalize questions and concerns.

## 2019-02-20 PROCEDURE — A9270 NON-COVERED ITEM OR SERVICE: HCPCS | Performed by: INTERNAL MEDICINE

## 2019-02-20 PROCEDURE — 99231 SBSQ HOSP IP/OBS SF/LOW 25: CPT | Mod: GC | Performed by: STUDENT IN AN ORGANIZED HEALTH CARE EDUCATION/TRAINING PROGRAM

## 2019-02-20 PROCEDURE — 700102 HCHG RX REV CODE 250 W/ 637 OVERRIDE(OP): Performed by: INTERNAL MEDICINE

## 2019-02-20 PROCEDURE — 770001 HCHG ROOM/CARE - MED/SURG/GYN PRIV*

## 2019-02-20 RX ORDER — ARIPIPRAZOLE 10 MG/1
5 TABLET ORAL DAILY
Status: DISCONTINUED | OUTPATIENT
Start: 2019-02-21 | End: 2019-02-21 | Stop reason: HOSPADM

## 2019-02-20 RX ADMIN — SERTRALINE 100 MG: 50 TABLET, FILM COATED ORAL at 05:26

## 2019-02-20 RX ADMIN — FLUTICASONE PROPIONATE 50 MCG: 50 SPRAY, METERED NASAL at 05:26

## 2019-02-20 NOTE — PROGRESS NOTES
Bedside report received from day RN. Assumed pt care. Pt a&ox4. POC discussed. Pt declines any needs at this time. Sitter Meliza at bedside. Belongings and call light at bedside per order. Bed locked and in lowest position. Call light within reach. Hourly rounding in place.

## 2019-02-20 NOTE — CARE PLAN
Problem: Safety  Goal: Will remain free from injury  Outcome: PROGRESSING AS EXPECTED  Fall precautions in place. Bed wheels locked, bed is in the lowest position. Call light in reach. Non-skid socks provided. Patient provides successful verbalization of fall and safety precautions and demonstration of use of call bell. Upper side rails are up. Hourly rounding in progress. 1:1 sitter at bedside.       Problem: Knowledge Deficit  Goal: Knowledge of disease process/condition, treatment plan, diagnostic tests, and medications will improve  Outcome: PROGRESSING AS EXPECTED  POC discussed with the patient. All questions and concerns addressed and answered. Patient is involved in POC and verbalizes the understanding of the disease process, medications, tests and treatments. Questions on POC encouraged throughout care.

## 2019-02-21 ENCOUNTER — PATIENT OUTREACH (OUTPATIENT)
Dept: HEALTH INFORMATION MANAGEMENT | Facility: OTHER | Age: 24
End: 2019-02-21

## 2019-02-21 VITALS
WEIGHT: 252.87 LBS | RESPIRATION RATE: 18 BRPM | DIASTOLIC BLOOD PRESSURE: 55 MMHG | HEIGHT: 63 IN | TEMPERATURE: 98.2 F | HEART RATE: 76 BPM | SYSTOLIC BLOOD PRESSURE: 106 MMHG | BODY MASS INDEX: 44.8 KG/M2 | OXYGEN SATURATION: 94 %

## 2019-02-21 PROCEDURE — 99239 HOSP IP/OBS DSCHRG MGMT >30: CPT | Mod: GC | Performed by: STUDENT IN AN ORGANIZED HEALTH CARE EDUCATION/TRAINING PROGRAM

## 2019-02-21 PROCEDURE — 700102 HCHG RX REV CODE 250 W/ 637 OVERRIDE(OP): Performed by: INTERNAL MEDICINE

## 2019-02-21 PROCEDURE — 700102 HCHG RX REV CODE 250 W/ 637 OVERRIDE(OP): Performed by: PSYCHIATRY & NEUROLOGY

## 2019-02-21 PROCEDURE — A9270 NON-COVERED ITEM OR SERVICE: HCPCS | Performed by: PSYCHIATRY & NEUROLOGY

## 2019-02-21 PROCEDURE — A9270 NON-COVERED ITEM OR SERVICE: HCPCS | Performed by: INTERNAL MEDICINE

## 2019-02-21 RX ORDER — ARIPIPRAZOLE 5 MG/1
5 TABLET ORAL DAILY
Qty: 30 TAB | Refills: 0 | Status: SHIPPED | OUTPATIENT
Start: 2019-02-22 | End: 2019-09-30

## 2019-02-21 RX ORDER — ARIPIPRAZOLE 5 MG/1
5 TABLET ORAL DAILY
Qty: 30 TAB | Refills: 0 | Status: SHIPPED | OUTPATIENT
Start: 2019-02-22 | End: 2019-02-21

## 2019-02-21 RX ADMIN — FLUTICASONE PROPIONATE 50 MCG: 50 SPRAY, METERED NASAL at 06:27

## 2019-02-21 RX ADMIN — SERTRALINE 100 MG: 50 TABLET, FILM COATED ORAL at 06:25

## 2019-02-21 RX ADMIN — ARIPIPRAZOLE 5 MG: 10 TABLET ORAL at 06:25

## 2019-02-21 NOTE — PROGRESS NOTES
1630: Baby's father Mookie came for a visit with patient's . Per order, Mookie is not permitted to visit. Educated and asked Mookie to leave. Mookie agreed and left with the child.  Pt states someone from psych team told her today that it's okay for Mookie to visit. Per chart review, no orders or notes permitting this.   1643: Paged Dr. Arias who is at bedside now.

## 2019-02-21 NOTE — PROGRESS NOTES
Internal Medicine Discharge Summary  Note Author: Kraig Venegas M.D.      Name Flori Car     1995   Age/Sex 23 y.o. female   MRN [id number]         Admit Date:  2019       Discharge Date:   2019    Service:   [facility] Internal Medicine Red Team  Attending Physician(s):   Dr. Venegas       Senior Resident(s):   Dr. Fleming  Kelvin Resident(s):   Dr. Rutledge/Dr. Mckenna  PCP: Sena Alonso M.D.      Primary Diagnosis:   Intentional overdose with calcium channel blocker as suicidal attempt secondary to uncontrolled post partum depression    Secondary Diagnoses:                Principal Problem:    Suicide attempt (HCC) POA: Yes  Active Problems:    Calcium channel blocker overdose POA: Yes    Post partum depression POA: Yes    Obesity POA: Unknown    HTN (hypertension) POA: Yes    History of preeclampsia, severe, third trimester POA: Yes  Resolved Problems:    Hypokalemia POA: Unknown      Hospital Summary (Brief Narrative):         22 y/o female hospitalized after intentional overdose with nifedipine who was initially admitted to the ICU given hypotension. Was tachycardic on arrival. Central venous line was placed and patient was monitored closely in the ICU where she responded well to fluid hydration without the need for pressors. She was placed on a legal hold and seen by psychiatry who increased her dose of sertraline to 100 mg during her stay and also added abilify later in her stay. Serial EKGs were performed and patient was transferred to the floor after not necessitating fluid hydration where she was deemed medically clear. She remained inpatient awaiting transfer to an inpatient mental health facility. While waiting transfer, the court cleared her from her legal hold and she was deemed stable for discharge by psychiatry. She was discharged home in stable condition and without suicidal ideation. She will continue follow up with her PCP and psychiatry on discharge. Nifedipine was not  re-started on discharge as blood pressure was controlled while inpatient without need for medication.    Patient /Hospital Summary (Details -- Problem Oriented) :          Calcium channel blocker overdose   Assessment & Plan    Overdosed with 20-30 pills of 60 mg extended release nifedipine. Was tachycardic and hypotensive in the ED, responded to IVF, also received IV glucagon, calcium gluconate and activated charcoal.   Patient cleared by court and will be discharged home     Plan:  D/C home with psych follow up      * Suicide attempt (HCC)   Assessment & Plan    Patient overdosed with 20-30 pills of long acting nifedipine. Reports prior history of suicidal ideation multiple times. Currently with postpartum depression. Psych following, appreciate recs.     Plan:   See above plan for calcium channel blocker overdose     Obesity   Assessment & Plan    BMI 45    Plan:  Recommend dietary counseling once post-partum depression better managed     Post partum depression   Assessment & Plan    3 months postpartum.  On Zoloft, follows with therapist Leandra Shepherd as an outpatient.    Plan:  Continue Zoloft 100 mg daily  Continue abilify               History of preeclampsia, severe, third trimester   Assessment & Plan    Was started on nifedipine post-delivery for persistent hypertension.     Plan:  Continue to hold Nifedipine as normotensive     HTN (hypertension)   Assessment & Plan    Has been normotensive off nifedipine during this admission    Plan:  Will need continued blood pressure follow up with her PCP  She did not require re-start of nifedipine on discharge         Consultants:     Psychiatry (Dr. Moy/Hugo)    Procedures:        Central line placement (2/15)    Imaging/ Testing:      DX-CHEST-PORTABLE (1 VIEW)   Final Result      Right internal jugular catheter has been placed and projects appropriately over the SVC            Discharge Medications:         Medication Reconciliation: Completed          Medication List      START taking these medications      Instructions   aripiprazole 5 MG tablet  Start taking on:  2/22/2019  Commonly known as:  ABILIFY   Take 1 Tab by mouth every day.  Dose:  5 mg        CONTINUE taking these medications      Instructions   docusate sodium 100 MG Caps   Take 100 mg by mouth 2 times a day as needed for Constipation.  Dose:  100 mg     fluticasone 50 MCG/ACT nasal spray  Commonly known as:  FLONASE   Spray 1 Spray in nose every day.  Dose:  1 Spray     PRENATAL 1+1 PO   Take  by mouth.     sertraline 50 MG Tabs  Commonly known as:  ZOLOFT   1 tab by mouth daily for 1 week then increase to 2 tabs daily.        STOP taking these medications    labetalol 200 MG Tabs  Commonly known as:  NORMODYNE     NIFEdipine 60 MG CR tablet  Commonly known as:  ADALAT CC                  Disposition:   Home with PCP follow up for HTN and psych follow up for post partum depression    Diet:   Regular    Activity:   As tolerated    Instructions:        The patient was instructed to return to the ER in the event of worsening symptoms. I have counseled the patient on the importance of compliance and the patient has agreed to proceed with all medical recommendations and follow up plan indicated above.   The patient understands that all medications come with benefits and risks. Risks may include permanent injury or death and these risks can be minimized with close reassessment and monitoring.        Primary Care Provider:    Sena Alonso M.D.      Follow up appointment details :      Behavioral Health appt March 5th  Follow up with PCP March 8th    Pending Studies:        none    Time spent on discharge day patient visit, preparing discharge paperwork and arranging for patient follow up.    Summary of follow up issues:   Follow up HTN and mood on new medication    Discharge Time (Minutes) :    35 minutes  Hospital Course Type:  Inpatient Stay >2 midnights      Condition on Discharge   Stable  ______________________________________________________________________    Interval history/exam for day of discharge:    Patient excited to go home to see her daughter again and be discharged from the hospital    General: Awake and alert, no acute distress  Resp: Respirations non labored without use of accessory muscles  Psych: Mood much improved from prior, appropriate affect        Most Recent Labs:    Lab Results   Component Value Date/Time    WBC 8.2 02/19/2019 02:45 AM    RBC 4.80 02/19/2019 02:45 AM    HEMOGLOBIN 12.9 02/19/2019 02:45 AM    HEMATOCRIT 41.0 02/19/2019 02:45 AM    MCV 85.4 02/19/2019 02:45 AM    MCH 26.9 (L) 02/19/2019 02:45 AM    MCHC 31.5 (L) 02/19/2019 02:45 AM    MPV 8.9 (L) 02/19/2019 02:45 AM    NEUTSPOLYS 54.80 02/19/2019 02:45 AM    LYMPHOCYTES 31.80 02/19/2019 02:45 AM    MONOCYTES 9.10 02/19/2019 02:45 AM    EOSINOPHILS 3.30 02/19/2019 02:45 AM    BASOPHILS 0.50 02/19/2019 02:45 AM      Lab Results   Component Value Date/Time    SODIUM 136 02/19/2019 02:45 AM    POTASSIUM 4.1 02/19/2019 02:45 AM    CHLORIDE 104 02/19/2019 02:45 AM    CO2 22 02/19/2019 02:45 AM    GLUCOSE 89 02/19/2019 02:45 AM    BUN 17 02/19/2019 02:45 AM    CREATININE 0.70 02/19/2019 02:45 AM      Lab Results   Component Value Date/Time    ALTSGPT 29 02/14/2019 11:00 PM    ASTSGOT 23 02/14/2019 11:00 PM    ALKPHOSPHAT 72 02/14/2019 11:00 PM    TBILIRUBIN 0.3 02/14/2019 11:00 PM    ALBUMIN 4.2 02/14/2019 11:00 PM    GLOBULIN 3.4 02/14/2019 11:00 PM    INR 0.96 11/07/2018 02:45 PM     Lab Results   Component Value Date/Time    PROTHROMBTM 12.9 11/07/2018 02:45 PM    INR 0.96 11/07/2018 02:45 PM

## 2019-02-21 NOTE — DISCHARGE PLANNING
Received court documents from Sherin HUNT indicating pt has been released from her legal hold. Placed documents in charts and met with pt at bedside to notify her. Pt states that she is in agreement with this and denies SI/HI ideation.   Attempted to contact bedside DANIELA Neves and joshua Hernandez and was unable to reach them; will try again later.

## 2019-02-21 NOTE — PROGRESS NOTES
Internal Medicine Interval Note  Note Author: Khushi Rutledge M.D.     Name Flori Car    1995   Sex female   MRN [id number]   Code Status Full     After 5PM or if no immediate response to page, please call for cross-coverage    Attending: Dr. Venegas  Team: Red See patient list for primary contact information  Call [phone] to page    1st Call:  Dr. Rutledge - Violeta Intern, R1 2nd Call:   Dr. Bernadette Mcdaniel Sr. Resident, R3     Admission Date   2019    Principal Problem  Presented with intentional overdose with home nifedipine in the setting of post partum depression.       Interval History  - patient remained medically stable.  - pleasant, denies depression or suicidal ideation.   - having regular BM, no issues with eating / drinking. Has been walking in room. 1:1 sitter present.      Review of system:   Constitutional: no fever, chills, malaise.  Mouth: no sore throat.  Eyes: no eye pain, visual changes.  Neuro: no headache, no focal deficits.  CVS: no chest pain, palpitation, dyspnea, orthopnea, PND, leg swelling.  Resp: no cough, SOB.  GI: having regular brown BM. No melena or blood. No nausea, vomiting, abd pain.  : no urinary retention or dysuria.  MSK:  No joint pain.  Skin: no skin rashes or lesions.  Heme/onc: no easy bruise or bleed.  Psych: no hallucinations.        Disposition  Inpatient psych facility, pending placement      Consultants  Psychiatry     PCP  Sena Alonso M.D.     Quality Measures  Mcghee catheter: No  DVT prophylaxis: Yes  Reviewed items: Labs, Medications, EKG and Imaging    Vitals  Vitals:    19 1500 19 2100 19 0400 19 0900   BP: 133/79 140/85 138/71 119/63   Pulse: 81 95 81 68   Resp: 18 16 16 17   Temp: 36.5 °C (97.7 °F) 36.3 °C (97.3 °F) 36.2 °C (97.1 °F) 36.6 °C (97.8 °F)   TempSrc: Temporal Temporal Temporal Temporal   SpO2: 96% 96% 95% 95%   Weight:  113.9 kg (251 lb 1.7 oz)     Height:           Physical Exam  General: No apparent  distress, pleasant, cooperative  HEN: Normocephalic, atraumatic, nose and bilateral ears normal   Eyes: EOMI, sclerae anicteric, normal conjunctivae  Throat: Oropharynx clear, moist oral mucosa  Neck: Supple, no lymphadenopathy, no thyromegaly  Lungs: Clear to auscultation bilaterally, no wheezes, rales or rhonchi  Heart: Normal rate, regular rhythm, no murmurs, rubs or gallops  Abdomen: Soft, obese, normoactive BS, nontender, no rebound or guarding  Extremities: No clubbing, no cyanosis, no edema  Neuro: Cranial nerves grossly intact, no focal neurological deficits  Skin: Warm, dry, intact, no suspicious rashes or lesions  Psych: Normal mood, flat affect      Labs  Recent Labs      02/18/19 0239 02/19/19   0245   SODIUM  137  136   POTASSIUM  3.9  4.1   CHLORIDE  106  104   CO2  21  22   BUN  17  17   CREATININE  0.64  0.70   MAGNESIUM   --   1.8   PHOSPHORUS   --   5.1*   CALCIUM  9.6  9.2     Recent Labs      02/18/19 0239 02/19/19   0245   GLUCOSE  91  89     Recent Labs      02/18/19 0239  02/19/19   0245   RBC  4.76  4.80   HEMOGLOBIN  13.0  12.9   HEMATOCRIT  40.8  41.0   PLATELETCT  303  316     Recent Labs      02/18/19 0239 02/19/19   0245   WBC  7.8  8.2   NEUTSPOLYS  58.20  54.80   LYMPHOCYTES  28.00  31.80   MONOCYTES  8.70  9.10   EOSINOPHILS  3.30  3.30   BASOPHILS  1.00  0.50       Assessment and Plan    * Suicide attempt (HCC)- (present on admission)   Assessment & Plan    Patient overdosed with 20-30 pills of long acting nifedipine. Reports prior history of suicidal ideation multiple times. Currently with postpartum depression. Psych following, appreciate recs.     Plan:   Legal hold extended by Psych on 2/18/19  Continue 1:1 sitter  Medically clear for discharge to inpatient Psych facility, awaiting placement      Calcium channel blocker overdose- (present on admission)   Assessment & Plan    Overdosed with 20-30 pills of 60 mg extended release nifedipine. Was tachycardic and  hypotensive in the ED, responded to IVF, also received IV glucagon, calcium gluconate and activated charcoal.   Has been stable on the floor and is awaiting placement for discharge to an inpatient Psych facility.     Plan:  Legal hold extended by Psych on 2/18/19  Continuing 1:1 sitter  Medically clear for transfer to inpatient Psych facility, awaiting placement by Social Work     Obesity   Assessment & Plan    BMI 45    Plan:  Recommend dietary counseling once post-partum depression better managed     Post partum depression- (present on admission)   Assessment & Plan    3 months postpartum.  On Zoloft, follows with therapist Leandra Shepherd as an outpatient.    Plan:  Continue Zoloft 100 mg daily     History of preeclampsia, severe, third trimester- (present on admission)   Assessment & Plan    Was started on nifedipine post-delivery for persistent hypertension.     Plan:  Continue to hold Nifedipine as normotensive     HTN (hypertension)- (present on admission)   Assessment & Plan    Has been normotensive off nifedipine during this admission    Plan:  Continue to hold nifedipine while normotensive

## 2019-02-21 NOTE — DISCHARGE PLANNING
Agency/Facility Name: [facility]  Spoke To: Nancy  Outcome: Patient referral pending.  No beds currently available, and further review when TRINA Peña had sent referral.

## 2019-02-21 NOTE — PROGRESS NOTES
Bedside report received. Pt care assumed. Pt resting comfortably. Pt not in distress. AOx 4. No complaints at this time. Safety precautions in place. Educated to call for assistance.

## 2019-02-21 NOTE — PROGRESS NOTES
Patient seen and examined with resident team and plan of care discussed. Patient to court today for possible extension of hold. Continues to be upbeat on new dose of sertraline. Remains medically clear for psychiatric facility should legal hold be extended.

## 2019-02-21 NOTE — DISCHARGE PLANNING
Legal Hold    Referral: Legal Hold Court     Intervention: Pt met with court MD's via telemedicine monitor 2/20 to contest the legal hold.     Court MD's released pt from legal hold. Pt is aware that cannot leave the hospital until Stipulation and Order releasing from hold is received.

## 2019-02-21 NOTE — DISCHARGE PLANNING
Received Stipulation and Order from the court releasing pt from legal hold. Sent copy to unit LSW.    Removed pt from legal hold as of 2/21 at 105.

## 2019-02-21 NOTE — DISCHARGE INSTRUCTIONS
Discharge Instructions    Discharged to home by car with friend. Discharged via walking, hospital escort: Yes.  Special equipment needed: Not Applicable    Be sure to schedule a follow-up appointment with your primary care doctor or any specialists as instructed.     Discharge Plan:   Influenza Vaccine Indication: Not indicated: Previously immunized this influenza season and > 8 years of age    I understand that a diet low in cholesterol, fat, and sodium is recommended for good health. Unless I have been given specific instructions below for another diet, I accept this instruction as my diet prescription.   Other diet: Regular    Special Instructions: None    · Is patient discharged on Warfarin / Coumadin?   No     Depression / Suicide Risk    As you are discharged from this [facility] facility, it is important to learn how to keep safe from harming yourself.    Recognize the warning signs:  · Abrupt changes in personality, positive or negative- including increase in energy   · Giving away possessions  · Change in eating patterns- significant weight changes-  positive or negative  · Change in sleeping patterns- unable to sleep or sleeping all the time   · Unwillingness or inability to communicate  · Depression  · Unusual sadness, discouragement and loneliness  · Talk of wanting to die  · Neglect of personal appearance   · Rebelliousness- reckless behavior  · Withdrawal from people/activities they love  · Confusion- inability to concentrate     If you or a loved one observes any of these behaviors or has concerns about self-harm, here's what you can do:  · Talk about it- your feelings and reasons for harming yourself  · Remove any means that you might use to hurt yourself (examples: pills, rope, extension cords, firearm)  · Get professional help from the community (Mental Health, Substance Abuse, psychological counseling)  · Do not be alone:Call your Safe Contact- someone whom you trust who will be there for  you.  · Call your local CRISIS HOTLINE 071-4854 or 324-982-5985  · Call your local Children's Mobile Crisis Response Team [place] (128) 205-0122 or www.Tripvisto  · Call the toll free National Suicide Prevention Hotlines   · National Suicide Prevention Lifeline 669-119-UJSD (0003)  · National Hope Line Network 800-SUICIDE (886-1364)  Depression, Adult  Depression refers to feeling sad, low, down in the dumps, blue, gloomy, or empty. In general, there are two kinds of depression:  1. Normal sadness or normal grief. This kind of depression is one that we all feel from time to time after upsetting life experiences, such as the loss of a job or the ending of a relationship. This kind of depression is considered normal, is short lived, and resolves within a few days to 2 weeks. Depression experienced after the loss of a loved one (bereavement) often lasts longer than 2 weeks but normally gets better with time.  2. Clinical depression. This kind of depression lasts longer than normal sadness or normal grief or interferes with your ability to function at home, at work, and in school. It also interferes with your personal relationships. It affects almost every aspect of your life. Clinical depression is an illness.  Symptoms of depression can also be caused by conditions other than those mentioned above, such as:  · Physical illness. Some physical illnesses, including underactive thyroid gland (hypothyroidism), severe anemia, specific types of cancer, diabetes, uncontrolled seizures, heart and lung problems, strokes, and chronic pain are commonly associated with symptoms of depression.  · Side effects of some prescription medicine. In some people, certain types of medicine can cause symptoms of depression.  · Substance abuse. Abuse of alcohol and illicit drugs can cause symptoms of depression.  SYMPTOMS  Symptoms of normal sadness and normal grief include the following:  · Feeling sad or crying for short  periods of time.  · Not caring about anything (apathy).  · Difficulty sleeping or sleeping too much.  · No longer able to enjoy the things you used to enjoy.  · Desire to be by oneself all the time (social isolation).  · Lack of energy or motivation.  · Difficulty concentrating or remembering.  · Change in appetite or weight.  · Restlessness or agitation.  Symptoms of clinical depression include the same symptoms of normal sadness or normal grief and also the following symptoms:  · Feeling sad or crying all the time.  · Feelings of guilt or worthlessness.  · Feelings of hopelessness or helplessness.  · Thoughts of suicide or the desire to harm yourself (suicidal ideation).  · Loss of touch with reality (psychotic symptoms). Seeing or hearing things that are not real (hallucinations) or having false beliefs about your life or the people around you (delusions and paranoia).  DIAGNOSIS   The diagnosis of clinical depression is usually based on how bad the symptoms are and how long they have lasted. Your health care provider will also ask you questions about your medical history and substance use to find out if physical illness, use of prescription medicine, or substance abuse is causing your depression. Your health care provider may also order blood tests.  TREATMENT   Often, normal sadness and normal grief do not require treatment. However, sometimes antidepressant medicine is given for bereavement to ease the depressive symptoms until they resolve.  The treatment for clinical depression depends on how bad the symptoms are but often includes antidepressant medicine, counseling with a mental health professional, or both. Your health care provider will help to determine what treatment is best for you.  Depression caused by physical illness usually goes away with appropriate medical treatment of the illness. If prescription medicine is causing depression, talk with your health care provider about stopping the medicine,  "decreasing the dose, or changing to another medicine.  Depression caused by the abuse of alcohol or illicit drugs goes away when you stop using these substances. Some adults need professional help in order to stop drinking or using drugs.  SEEK IMMEDIATE MEDICAL CARE IF:  · You have thoughts about hurting yourself or others.  · You lose touch with reality (have psychotic symptoms).  · You are taking medicine for depression and have a serious side effect.  FOR MORE INFORMATION  · National [place] on Mental Illness: www.jonah.org   · National Plato of Mental Health: www.nimh.nih.gov      This information is not intended to replace advice given to you by your health care provider. Make sure you discuss any questions you have with your health care provider.     Document Released: 12/15/2001 Document Revised: 01/08/2016 Document Reviewed: 03/18/2013  [employer] Interactive Patient Education ©2016 [employer]    Overdose, Adult  A person can overdose on alcohol, drugs or both by accident or on purpose. If it was on purpose, it is a serious matter. Professional help should be sought. If the overdose was an accident, certain steps should be taken to make sure that it never happens again.  ACCIDENTAL OVERDOSE  Overdosing on prescription medications can be a result of:  · Not understanding the instructions.  · Misreading the label.  · Forgetting that you took a dose and then taking another by mistake. This situation happens a lot.  To make sure this does not happen again:  · Clarify the correct dosage with your caregiver.  · Place the correct dosage in a \"pill-minder\" container (labeled for each day and time of day).  · Have someone dispense your medicine.  Please be sure to follow-up with your primary care doctor as directed.  INTENTIONAL OVERDOSE  If the overdose was on purpose, it is a serious situation. Taking more than the prescribed amount of medications (including taking someone else's prescription), abusing street " "drugs or drinking an amount of alcohol that requires medical treatment can show a variety of possible problems. These may indicate you:  · Are depressed or suicidal.  · Are abusing drugs, took too much or combined different drugs to experiment with the effects.  · Mixed alcohol with drugs and did not realize the danger of doing so (this is drug abuse).  · Are suffering addiction to drugs and/or alcohol (also known as chemical dependency).  · Binge drink.  If you have not been referred to a mental health professional for help, it is important that you get help right away. Only a professional can determine which problems may exist and what the best course of treatment may be. It is your responsibility to follow-up with further evaluation or treatment as directed.   Alcohol is responsible for a large number of overdoses and unintended deaths among college-age young adults. Binge drinking is consuming 4-5 drinks in a short period of time. The amount of alcohol in standard servings of wine (5 oz.), beer (12 oz.) and distilled spirits (1.5 oz., 80 proof) is the same. Beer or wine can be just as dangerous to the binge drinker as \"hard\" liquor can be.   CONSEQUENCES OF BINGE DRINKING  Alcohol poisoning is the most serious consequence of binge drinking. This is a severe and potentially fatal physical reaction to an alcohol overdose. When too much alcohol is consumed, the brain does not get enough oxygen. The lack of oxygen will eventually cause the brain to shut down the voluntary functions that regulate breathing and heart rate. Symptoms of alcohol poisoning include:  · Vomiting.  · Passing out (unconsciousness).  · Cold, clammy, pale or bluish skin.  · Slow or irregular breathing.  WHAT SHOULD I DO NEXT?  If you have a history of drug abuse or suffer chemical dependency (alcoholism, drug addiction or both), you might consider the following:  · Talk with a qualified substance abuse counselor and consider entering a " "treatment program.  · Go to a detox facility if necessary.  · If you were attending self-help group meetings, consider returning to them and go often.  · Explore other resources located near you (see sources listed below).  If you are unsure if you have a substance abuse problem, ask yourself the following questions:  · Have you been told by friends or family that drugs/alcohol has become a problem?  · Do you get into fights when drinking or using drugs?  · Do you have blackouts (not remembering what you do while using)?  · Do you lie about use or amounts of drugs or alcohol you consume?  · Do you need chemicals to get you going?  · Do you suffer in work or school performance because of drug or alcohol use?  · Do you get sick from drug or alcohol use but continue to use anyway?  · Do you need drugs or alcohol to relate to people or feel comfortable in social situations?  · Do you use drugs or alcohol to forget problems?  If you answered \"Yes\" to any of the above questions, it means you show signs of chemical dependency and a professional evaluation is suggested. The longer the use of drugs and alcohol continues, the problems will become greater.  SEEK IMMEDIATE MEDICAL CARE IF:   · You feel like you might repeat your problematic behavior.  · You need someone to talk to and feel that it should not wait.  · You feel you are a danger to yourself or someone else.  · You feel like you are having a new reaction to medications you are taking, or you are getting worse after leaving a care center.  · You have an overwhelming urge to drink or use drugs.  Addiction cannot be cured, but it can be treated successfully. Treatment centers are listed in the yellow pages under: Cocaine, Narcotics, and Alcoholics Anonymous. Most hospitals and clinics can refer you to a specialized care center. The [place] government maintains a toll-free number for obtaining treatment referrals: 1-129.762.9377 or 1-570.886.5084 (TDD) and maintains a " website: http://findtreatment.[facility].gov. Other websites for additional information are: www.mentalhealth.samhsa.gov. and www.[place].gov.  In [place] treatment resources are listed in each Province with listings available under [facility] or similar titles.  Document Released: 12/20/2004 Document Revised: 03/11/2013 Document Reviewed: 11/11/2009  ExitCare® Patient Information ©2014 [employer].

## 2019-02-21 NOTE — PROGRESS NOTES
Discharge instructions and prescriptions given. Pt had no IV's or tele box. Pt understands importance of follow up visits. Personally walked patient to her car and watched her drive off.

## 2019-02-21 NOTE — CARE PLAN
Problem: Safety  Goal: Will remain free from injury  Outcome: PROGRESSING AS EXPECTED  Patient able to call for assistance as needed.  Sitter at bedside    Problem: Psychosocial Needs:  Goal: Level of anxiety will decrease  Outcome: PROGRESSING AS EXPECTED  Patient slept well through the night.  Denies any issues at this time.  Sitter at bedside

## 2019-02-22 NOTE — DISCHARGE SUMMARY
Internal Medicine Discharge Summary  Note Author: Kraig Venegas M.D.        Name Flori Car     1995   Age/Sex 23 y.o. female   MRN [id number]            Admit Date:  2019       Discharge Date:   2019     Service:   [facility] Internal Medicine Red Team  Attending Physician(s):   Dr. Venegas       Senior Resident(s):   Dr. Fleming  [place] Resident(s):   Dr. Rutledge/Dr. Mckenna  PCP: Sena Alonso M.D.        Primary Diagnosis:   Intentional overdose with calcium channel blocker as suicidal attempt secondary to uncontrolled post partum depression     Secondary Diagnoses:                Principal Problem:    Suicide attempt (HCC) POA: Yes  Active Problems:    Calcium channel blocker overdose POA: Yes    Post partum depression POA: Yes    Obesity POA: Unknown    HTN (hypertension) POA: Yes    History of preeclampsia, severe, third trimester POA: Yes  Resolved Problems:    Hypokalemia POA: Unknown        Hospital Summary (Brief Narrative):          24 y/o female hospitalized after intentional overdose with nifedipine who was initially admitted to the ICU given hypotension. Was tachycardic on arrival. Central venous line was placed and patient was monitored closely in the ICU where she responded well to fluid hydration without the need for pressors. She was placed on a legal hold and seen by psychiatry who increased her dose of sertraline to 100 mg during her stay and also added abilify later in her stay. Serial EKGs were performed and patient was transferred to the floor after not necessitating fluid hydration where she was deemed medically clear. She remained inpatient awaiting transfer to an inpatient mental health facility. While waiting transfer, the court cleared her from her legal hold and she was deemed stable for discharge by psychiatry. She was discharged home in stable condition and without suicidal ideation. She will continue follow up with her PCP and psychiatry on discharge.  Nifedipine was not re-started on discharge as blood pressure was controlled while inpatient without need for medication.     Patient /Hospital Summary (Details -- Problem Oriented) :           Calcium channel blocker overdose   Assessment & Plan     Overdosed with 20-30 pills of 60 mg extended release nifedipine. Was tachycardic and hypotensive in the ED, responded to IVF, also received IV glucagon, calcium gluconate and activated charcoal.   Patient cleared by court and will be discharged home     Plan:  D/C home with psych follow up       * Suicide attempt (HCC)   Assessment & Plan     Patient overdosed with 20-30 pills of long acting nifedipine. Reports prior history of suicidal ideation multiple times. Currently with postpartum depression. Psych following, appreciate recs.     Plan:   See above plan for calcium channel blocker overdose      Obesity   Assessment & Plan     BMI 45     Plan:  Recommend dietary counseling once post-partum depression better managed      Post partum depression   Assessment & Plan     3 months postpartum.  On Zoloft, follows with therapist Leandra Shepherd as an outpatient.     Plan:  Continue Zoloft 100 mg daily  Continue abilify                    History of preeclampsia, severe, third trimester   Assessment & Plan     Was started on nifedipine post-delivery for persistent hypertension.     Plan:  Continue to hold Nifedipine as normotensive      HTN (hypertension)   Assessment & Plan     Has been normotensive off nifedipine during this admission     Plan:  Will need continued blood pressure follow up with her PCP  She did not require re-start of nifedipine on discharge            Consultants:     Psychiatry (Dr. Moy/Hugo)     Procedures:        Central line placement (2/15)     Imaging/ Testing:      DX-CHEST-PORTABLE (1 VIEW)   Final Result       Right internal jugular catheter has been placed and projects appropriately over the SVC                Discharge Medications:          Medication Reconciliation: Completed             Medication List           START taking these medications      Instructions   aripiprazole 5 MG tablet  Start taking on:  2/22/2019  Commonly known as:  ABILIFY    Take 1 Tab by mouth every day.  Dose:  5 mg                  CONTINUE taking these medications      Instructions   docusate sodium 100 MG Caps    Take 100 mg by mouth 2 times a day as needed for Constipation.  Dose:  100 mg      fluticasone 50 MCG/ACT nasal spray  Commonly known as:  FLONASE    Spray 1 Spray in nose every day.  Dose:  1 Spray      PRENATAL 1+1 PO    Take  by mouth.      sertraline 50 MG Tabs  Commonly known as:  ZOLOFT    1 tab by mouth daily for 1 week then increase to 2 tabs daily.          STOP taking these medications    labetalol 200 MG Tabs  Commonly known as:  NORMODYNE      NIFEdipine 60 MG CR tablet  Commonly known as:  ADALAT CC                         Disposition:   Home with PCP follow up for HTN and psych follow up for post partum depression     Diet:   Regular     Activity:   As tolerated    Instructions:         The patient was instructed to return to the ER in the event of worsening symptoms. I have counseled the patient on the importance of compliance and the patient has agreed to proceed with all medical recommendations and follow up plan indicated above.   The patient understands that all medications come with benefits and risks. Risks may include permanent injury or death and these risks can be minimized with close reassessment and monitoring.         Primary Care Provider:    Sena Alonso M.D.        Follow up appointment details :      Behavioral Health appt March 5th  Follow up with PCP March 8th     Pending Studies:        none     Time spent on discharge day patient visit, preparing discharge paperwork and arranging for patient follow up.     Summary of follow up issues:   Follow up HTN and mood on new medication     Discharge Time (Minutes) :    35  minutes  Hospital Course Type:  Inpatient Stay >2 midnights        Condition on Discharge  Stable  ______________________________________________________________________     Interval history/exam for day of discharge:    Patient excited to go home to see her daughter again and be discharged from the hospital     General: Awake and alert, no acute distress  Resp: Respirations non labored without use of accessory muscles  Psych: Mood much improved from prior, appropriate affect           Most Recent Labs:          Lab Results   Component Value Date/Time     WBC 8.2 02/19/2019 02:45 AM     RBC 4.80 02/19/2019 02:45 AM     HEMOGLOBIN 12.9 02/19/2019 02:45 AM     HEMATOCRIT 41.0 02/19/2019 02:45 AM     MCV 85.4 02/19/2019 02:45 AM     MCH 26.9 (L) 02/19/2019 02:45 AM     MCHC 31.5 (L) 02/19/2019 02:45 AM     MPV 8.9 (L) 02/19/2019 02:45 AM     NEUTSPOLYS 54.80 02/19/2019 02:45 AM     LYMPHOCYTES 31.80 02/19/2019 02:45 AM     MONOCYTES 9.10 02/19/2019 02:45 AM     EOSINOPHILS 3.30 02/19/2019 02:45 AM     BASOPHILS 0.50 02/19/2019 02:45 AM            Lab Results   Component Value Date/Time     SODIUM 136 02/19/2019 02:45 AM     POTASSIUM 4.1 02/19/2019 02:45 AM     CHLORIDE 104 02/19/2019 02:45 AM     CO2 22 02/19/2019 02:45 AM     GLUCOSE 89 02/19/2019 02:45 AM     BUN 17 02/19/2019 02:45 AM     CREATININE 0.70 02/19/2019 02:45 AM            Lab Results   Component Value Date/Time     ALTSGPT 29 02/14/2019 11:00 PM     ASTSGOT 23 02/14/2019 11:00 PM     ALKPHOSPHAT 72 02/14/2019 11:00 PM     TBILIRUBIN 0.3 02/14/2019 11:00 PM     ALBUMIN 4.2 02/14/2019 11:00 PM     GLOBULIN 3.4 02/14/2019 11:00 PM     INR 0.96 11/07/2018 02:45 PM            Lab Results   Component Value Date/Time     PROTHROMBTM 12.9 11/07/2018 02:45 PM     INR 0.96 11/07/2018 02:45 PM        Patient seen and examined with Dr. Fleming/ .  I agree with the examination and assessment/plan as listed in resident portion of note.     I agree with  discharge documentation which reflects my direct input.  Overall personal time spent on discharge of this patient is 35 minutes.

## 2019-03-06 ENCOUNTER — TELEPHONE (OUTPATIENT)
Dept: BEHAVIORAL HEALTH | Facility: CLINIC | Age: 24
End: 2019-03-06

## 2019-03-11 ENCOUNTER — HOSPITAL ENCOUNTER (OUTPATIENT)
Dept: LACTATION | Facility: MEDICAL CENTER | Age: 24
End: 2019-03-11

## 2019-03-11 NOTE — LACTATION NOTE
Concern: Re-lactation for infants constipation     History: NICU 18, 34.6wk gestation with 8/9 Apgars to for feeding support and hyperbilirubinemia. Born by C/S emergently for pre- eclampsia and very high blood pressure   22yo mom.   Mom medications: Abilify and Zoloft.  No history of breast surgery, GDM, PCOS.       Baby has been on Similac  and reports that baby has had hard stools, crying to have a BM and uncomfortable She is also vomiting 1-2 times per day after a feeding about 1/2 the feeding. She changed the formula to Similac sensitive and doesn't find it has totally changed the situation.  She feeds between 2-4oz per feeding about every 3 hours. There is no blood in the stool or spit up.  She is otherwise not fussy. Her last weight in 1/15/19 but feels she continues growing well.  Baby is not here today. Mom is not interested in breastfeeding but providing milk to her baby.      Mom had initiated pumping while baby was in NICU but not always able to get 8x/24 hours. She stopped pumping about 2 months ago and has picked up the pump and a kit over the weekend to start again. She did try to pump  once and there was no milk. The max volume she made while pumping was 2oz with a 60/40% between the breasts, mostly less milk.      Assessment: Infant not here to assess for weight gain and under or over feeding as a cause of constipation.  Will see Pediatrician next week. Reviewed pumping and cleaning parts as mom is familiar from her NICU days.      Plan:  Pump 8x/24 hours, supply will depend on building back up and can take 2 months and no quantity guaranteed.  May use coconut or olive oil on breasts before pumping to glide the pump better   [place] formula with a different casein/whey ration may facilitate softer stools  Can discuss hydrolyzed formula with pediatrician  Offer less volume, not more than 3.5oz and see if that decreases vomiting  Tummy and low back massage as demonstrated.   Juices and  medications not recommended.  Shared milk discussed as an option.  Discuss with Pediatrician what has helped an not next week.

## 2019-04-02 ENCOUNTER — OFFICE VISIT (OUTPATIENT)
Dept: BEHAVIORAL HEALTH | Facility: CLINIC | Age: 24
End: 2019-04-02
Payer: COMMERCIAL

## 2019-04-02 PROCEDURE — 90834 PSYTX W PT 45 MINUTES: CPT | Performed by: SOCIAL WORKER

## 2019-04-02 NOTE — BH THERAPY
[facility]  Therapy Progress Note    Patient Name: Flori Car  Patient MRN: [id number]  Today's Date: 4/2/2019     Type of session:Individual psychotherapy  Length of session: 45 minutes  Persons in attendance:Patient    Subjective/New Info: Client reported she has felt pretty stable since leaving the hospital for a suicide attempt. She stated she wasn't ready to talk about it yet, thought. She did report she is recently out of meds, and feeling a little low. Discussed dangers of just stopping the medications, and encouraged client to go to the Emergency Room if any thoughts of suicide returned. She reported she and Mookie are staying together and trying to make their relationship work. She is still struggling with poor self concept, and reported she is pretty mean to her self. Discussed self-compassion.     Objective/Observations:   Participation: Limited verbal participation and Engaged   Grooming: Good and Casual   Cognition: Alert and Fully Oriented   Eye contact: Limited   Mood: Depressed and Anxious   Affect: Constricted   Thought process: Logical and Goal-directed   Speech: Rate within normal limits and Volume within normal limits   Other:     Diagnoses:   1. Postpartum depression associated with first pregnancy         Current risk:   SUICIDE: Moderate   Homicide: Low   Self-harm: Low   Relapse: Not applicable   Other:    Safety Plan reviewed? Yes, and instructed to go to ER if thoughts of suicide returned due to stopping her medications    If evidence of imminent risk is present, intervention/plan:     Therapeutic Intervention(s): Supportive psychotherapy    Treatment Goal(s)/Objective(s) addressed:   · Develop and utilize skills to manage mood and emotional suffering more effectively  · Learn to successfully challenge & change distortions in thinking  · Increase behaviors of self-compassion and self-care  · Identify and address issues related to postpartum depression, including  self-image, parenting, and relationship issues    Progress toward Treatment Goals: No change    Plan:  - Continue Individual therapy   MAKE APPT WITH PSYCHIATRIST   - Next appointment scheduled:  4/16/2019  - Patient is in agreement with the above plan:  YES    Terri Shepherd L.C.S.W.  4/2/2019

## 2019-04-16 ENCOUNTER — OFFICE VISIT (OUTPATIENT)
Dept: BEHAVIORAL HEALTH | Facility: CLINIC | Age: 24
End: 2019-04-16
Payer: COMMERCIAL

## 2019-04-16 PROCEDURE — 90834 PSYTX W PT 45 MINUTES: CPT | Performed by: SOCIAL WORKER

## 2019-04-16 NOTE — BH THERAPY
[facility]  Therapy Progress Note    Patient Name: Flori Car  Patient MRN: [id number]  Today's Date: 4/16/2019     Type of session:Individual psychotherapy  Length of session: 45 minutes  Persons in attendance:Patient    Subjective/New Info: Client reported she was doing well today, but had had a low week overall. She is still not on medication, and could not get in with a [facility] pre scriber until August. Provided name of community psychiatrist, and also encouraged her to talk with her PCP about getting a prescription to fill the gap until she can see her [facility] provider.     She showed some insight into herself, noticing that when she feels distance from her partner, she is looking to get needs met, and she wants to be able to meet her own needs. However, she doesn't always know what her needs are. Discussed needs, including [facility] list of needs and Maslow's hierarchy. Client was able to say her basic needs are met, but she struggles with needs relating to connection and belonging.     Objective/Observations:   Participation: Limited verbal participation and Engaged   Grooming: Good and Casual   Cognition: Alert and Fully Oriented   Eye contact: Good   Mood: Euthymic   Affect: Congruent with content   Thought process: Logical and Goal-directed   Speech: Rate within normal limits and Volume within normal limits   Other:     Diagnoses:   1. Postpartum depression associated with first pregnancy         Current risk:   SUICIDE: Low, denied ideation, intent, future oriented, cited reasons for living    Homicide: Low   Self-harm: Low   Relapse: Not applicable   Other:    Safety Plan reviewed? Not Indicated   If evidence of imminent risk is present, intervention/plan:     Therapeutic Intervention(s): Psychoeducation RE: needs and Supportive psychotherapy    Treatment Goal(s)/Objective(s) addressed:   · Develop and utilize skills to manage mood and emotional suffering more effectively  · Learn to  successfully challenge & change distortions in thinking  · Increase behaviors of self-compassion and self-care  · Identify and address issues related to postpartum depression, including self-image, parenting, and relationship issues    Progress toward Treatment Goals: Mild improvement    Plan:  - Continue Individual therapy  - Next appointment scheduled:  4/30/2019  - Patient is in agreement with the above plan:  YES    Terri Shepherd L.C.S.W.  4/16/2019

## 2019-04-17 ENCOUNTER — TELEPHONE (OUTPATIENT)
Dept: SCHEDULING | Facility: IMAGING CENTER | Age: 24
End: 2019-04-17

## 2019-04-30 ENCOUNTER — OFFICE VISIT (OUTPATIENT)
Dept: BEHAVIORAL HEALTH | Facility: CLINIC | Age: 24
End: 2019-04-30
Payer: COMMERCIAL

## 2019-04-30 PROCEDURE — 90834 PSYTX W PT 45 MINUTES: CPT | Performed by: SOCIAL WORKER

## 2019-04-30 NOTE — BH THERAPY
" [facility]  Therapy Progress Note    Patient Name: Flori Car  Patient MRN: [id number]  Today's Date: 4/30/2019     Type of session:Individual psychotherapy  Length of session: 45 minutes  Persons in attendance:Patient and Children    Subjective/New Info: Client reported she is doing okay. She has been thinking about her relationship, and isn't sure she wants to stay. She is harboring some resentments, and doesn't feel secure in her relationship. 'I changed myself to be what he wanted. But I don't think it worked.\" Discussed ways client can show up for herself, and reclaim parts of herself. She reported she continues to keep thought logs, and finds them helpful. She is showing better attachment with her daughter. \"I worry at times she may not know I'm her mom.\" She stated she is thinking about possibly moving with her sister, and taking her daughter with her. \"When I think about my future, it's me and Connie.\"     Objective/Observations:   Participation: Active verbal participation, Attentive, Engaged and Open to feedback   Grooming: Good and Casual   Cognition: Alert and Fully Oriented   Eye contact: Good   Mood: Congruent with content   Affect: Congruent with content   Thought process: Logical and Goal-directed   Speech: Rate within normal limits and Volume within normal limits   Other:     Diagnoses:   1. Postpartum depression associated with first pregnancy         Current risk:   SUICIDE: Low, denied SI, intent, or plan    Homicide: Low   Self-harm: Low   Relapse: Not applicable   Other:    Safety Plan reviewed? No   If evidence of imminent risk is present, intervention/plan:     Therapeutic Intervention(s): Self-care skills and Supportive psychotherapy    Treatment Goal(s)/Objective(s) addressed:   · Develop and utilize skills to manage mood and emotional suffering more effectively  · Learn to successfully challenge & change distortions in thinking  · Increase behaviors of " self-compassion and self-care  · Identify and address issues related to postpartum depression, including self-image, parenting, and relationship issues    Progress toward Treatment Goals: Mild improvement    Plan:  - Continue Individual therapy  - Next appointment scheduled:  5/14/2019  - Patient is in agreement with the above plan:  YES    Terri Shepherd L.C.S.W.  4/30/2019

## 2019-05-14 ENCOUNTER — APPOINTMENT (OUTPATIENT)
Dept: BEHAVIORAL HEALTH | Facility: CLINIC | Age: 24
End: 2019-05-14

## 2019-06-25 ENCOUNTER — OFFICE VISIT (OUTPATIENT)
Dept: URGENT CARE | Facility: CLINIC | Age: 24
End: 2019-06-25
Payer: COMMERCIAL

## 2019-06-25 VITALS
OXYGEN SATURATION: 99 % | HEART RATE: 93 BPM | HEIGHT: 63 IN | WEIGHT: 248 LBS | RESPIRATION RATE: 16 BRPM | DIASTOLIC BLOOD PRESSURE: 68 MMHG | BODY MASS INDEX: 43.94 KG/M2 | SYSTOLIC BLOOD PRESSURE: 124 MMHG | TEMPERATURE: 97.8 F

## 2019-06-25 DIAGNOSIS — J03.90 EXUDATIVE TONSILLITIS: ICD-10-CM

## 2019-06-25 LAB
INT CON NEG: NEGATIVE
INT CON POS: POSITIVE
S PYO AG THROAT QL: NEGATIVE

## 2019-06-25 PROCEDURE — 99203 OFFICE O/P NEW LOW 30 MIN: CPT | Performed by: NURSE PRACTITIONER

## 2019-06-25 PROCEDURE — 87880 STREP A ASSAY W/OPTIC: CPT | Performed by: NURSE PRACTITIONER

## 2019-06-25 RX ORDER — AMOXICILLIN 875 MG/1
875 TABLET, COATED ORAL 2 TIMES DAILY
Qty: 20 TAB | Refills: 0 | Status: SHIPPED | OUTPATIENT
Start: 2019-06-25 | End: 2019-07-05

## 2019-06-25 ASSESSMENT — ENCOUNTER SYMPTOMS
COUGH: 0
MYALGIAS: 0
WHEEZING: 0
DIARRHEA: 0
NAUSEA: 0
FEVER: 0
ORTHOPNEA: 0
HEADACHES: 0
CHILLS: 0
NECK PAIN: 1
EYE DISCHARGE: 0
SORE THROAT: 1

## 2019-06-25 NOTE — PROGRESS NOTES
Subjective:      Flori Car is a 23 y.o. female who presents with Sore Throat (x3 days, sore throat, white spots on tonsils, hurt to swallow, bodyaches)            HPI New. 23 year old female with sore throat x 3 days. She denies any fever, chills, myalgia, headache or nausea. She has no congestion or cough at this time. She has not taken any medication for this. She does also report neck discomfort and swollen glands.  Patient has no known allergies.  Current Outpatient Prescriptions on File Prior to Visit   Medication Sig Dispense Refill   • aripiprazole (ABILIFY) 5 MG tablet Take 1 Tab by mouth every day. (Patient not taking: Reported on 4/16/2019) 30 Tab 0   • sertraline (ZOLOFT) 50 MG Tab 1 tab by mouth daily for 1 week then increase to 2 tabs daily. (Patient not taking: Reported on 4/16/2019) 60 Tab 2   • docusate sodium 100 MG Cap Take 100 mg by mouth 2 times a day as needed for Constipation. (Patient not taking: Reported on 4/30/2019) 60 Cap 1   • Prenatal Vit-Fe Fumarate-FA (PRENATAL 1+1 PO) Take  by mouth.     • fluticasone (FLONASE) 50 MCG/ACT nasal spray Spray 1 Spray in nose every day.       No current facility-administered medications on file prior to visit.      Social History     Social History   • Marital status: Single     Spouse name: N/A   • Number of children: N/A   • Years of education: N/A     Occupational History   • Not on file.     Social History Main Topics   • Smoking status: Never Smoker   • Smokeless tobacco: Never Used   • Alcohol use No   • Drug use: No   • Sexual activity: Yes     Partners: Male     Birth control/ protection: Pill     Other Topics Concern   • Not on file     Social History Narrative   • No narrative on file     family history includes Anemia in her maternal aunt; Cancer in her maternal aunt, maternal grandmother, and mother; Depression in her father and mother; Drug abuse in her father and mother; Hypertension in her maternal aunt; Obesity in her father,  "maternal grandmother, and mother. She was adopted.      Review of Systems   Constitutional: Positive for malaise/fatigue. Negative for chills and fever.   HENT: Positive for sore throat. Negative for congestion.    Eyes: Negative for discharge.   Respiratory: Negative for cough and wheezing.    Cardiovascular: Negative for chest pain and orthopnea.   Gastrointestinal: Negative for diarrhea and nausea.   Musculoskeletal: Positive for neck pain. Negative for myalgias.   Neurological: Negative for headaches.   Endo/Heme/Allergies: Negative for environmental allergies.          Objective:     /68   Pulse 93   Temp 36.6 °C (97.8 °F) (Temporal)   Resp 16   Ht 1.6 m (5' 3\")   Wt 112.5 kg (248 lb)   LMP 06/05/2019   SpO2 99%   Breastfeeding? No   BMI 43.93 kg/m²      Physical Exam   Constitutional: She is oriented to person, place, and time. She appears well-developed and well-nourished. No distress.   HENT:   Head: Normocephalic and atraumatic.   Right Ear: External ear and ear canal normal. Tympanic membrane is not injected and not perforated. No middle ear effusion.   Left Ear: External ear and ear canal normal. Tympanic membrane is not injected and not perforated.  No middle ear effusion.   Nose: No mucosal edema.   Mouth/Throat: Oropharyngeal exudate and posterior oropharyngeal erythema present.   Eyes: Conjunctivae are normal. Right eye exhibits no discharge. Left eye exhibits no discharge.   Neck: Normal range of motion. Neck supple.   Cardiovascular: Normal rate, regular rhythm and normal heart sounds.    No murmur heard.  Pulmonary/Chest: Effort normal and breath sounds normal. No respiratory distress.   Musculoskeletal: Normal range of motion.   Normal movement of all 4 extremities.   Lymphadenopathy:     She has no cervical adenopathy.        Right: No supraclavicular adenopathy present.        Left: No supraclavicular adenopathy present.   Neurological: She is alert and oriented to person, place, " and time. Gait normal.   Skin: Skin is warm and dry.   Psychiatric: She has a normal mood and affect. Her behavior is normal. Thought content normal.               Assessment/Plan:     1. Exudative tonsillitis  amoxicillin (AMOXIL) 875 MG tablet    POCT Rapid Strep A     Strep negative.  Amoxicillin  Ibuprofen for discomfort.  Salt water gargles.  Differential diagnosis, natural history, supportive care, and indications for immediate follow-up discussed at length.

## 2019-08-20 ENCOUNTER — APPOINTMENT (OUTPATIENT)
Dept: BEHAVIORAL HEALTH | Facility: CLINIC | Age: 24
End: 2019-08-20

## 2019-09-30 ENCOUNTER — OFFICE VISIT (OUTPATIENT)
Dept: URGENT CARE | Facility: MEDICAL CENTER | Age: 24
End: 2019-09-30
Payer: COMMERCIAL

## 2019-09-30 VITALS
SYSTOLIC BLOOD PRESSURE: 138 MMHG | HEIGHT: 63 IN | RESPIRATION RATE: 16 BRPM | TEMPERATURE: 98.1 F | HEART RATE: 85 BPM | DIASTOLIC BLOOD PRESSURE: 76 MMHG | BODY MASS INDEX: 43.94 KG/M2 | WEIGHT: 248 LBS | OXYGEN SATURATION: 98 %

## 2019-09-30 DIAGNOSIS — R25.3 EYE MUSCLE TWITCHES: ICD-10-CM

## 2019-09-30 DIAGNOSIS — J22 LOWER RESP. TRACT INFECTION: ICD-10-CM

## 2019-09-30 PROCEDURE — 99214 OFFICE O/P EST MOD 30 MIN: CPT | Performed by: NURSE PRACTITIONER

## 2019-09-30 RX ORDER — AZITHROMYCIN 250 MG/1
TABLET, FILM COATED ORAL
Qty: 6 TAB | Refills: 0 | Status: SHIPPED | OUTPATIENT
Start: 2019-09-30 | End: 2021-10-27

## 2019-09-30 ASSESSMENT — ENCOUNTER SYMPTOMS
DIARRHEA: 0
CONSTIPATION: 0
HEADACHES: 0
PALPITATIONS: 0
BLURRED VISION: 0
COUGH: 1
VOMITING: 0
NAUSEA: 0
ABDOMINAL PAIN: 0
CHILLS: 0
DIZZINESS: 0
STRIDOR: 0
WEIGHT LOSS: 0
MUSCULOSKELETAL NEGATIVE: 1
WHEEZING: 0
SORE THROAT: 0
FEVER: 0
DOUBLE VISION: 0

## 2020-03-17 ENCOUNTER — HOSPITAL ENCOUNTER (OUTPATIENT)
Dept: LAB | Facility: MEDICAL CENTER | Age: 25
End: 2020-03-17
Attending: PSYCHIATRY & NEUROLOGY
Payer: COMMERCIAL

## 2020-03-17 LAB
ALBUMIN SERPL BCP-MCNC: 4.3 G/DL (ref 3.2–4.9)
ALBUMIN/GLOB SERPL: 1.2 G/DL
ALP SERPL-CCNC: 76 U/L (ref 30–99)
ALT SERPL-CCNC: 18 U/L (ref 2–50)
ANION GAP SERPL CALC-SCNC: 10 MMOL/L (ref 7–16)
AST SERPL-CCNC: 16 U/L (ref 12–45)
BASOPHILS # BLD AUTO: 0.8 % (ref 0–1.8)
BASOPHILS # BLD: 0.05 K/UL (ref 0–0.12)
BILIRUB SERPL-MCNC: 0.6 MG/DL (ref 0.1–1.5)
BUN SERPL-MCNC: 16 MG/DL (ref 8–22)
CALCIUM SERPL-MCNC: 9.7 MG/DL (ref 8.5–10.5)
CHLORIDE SERPL-SCNC: 104 MMOL/L (ref 96–112)
CHOLEST SERPL-MCNC: 332 MG/DL (ref 100–199)
CO2 SERPL-SCNC: 23 MMOL/L (ref 20–33)
CREAT SERPL-MCNC: 0.78 MG/DL (ref 0.5–1.4)
EOSINOPHIL # BLD AUTO: 0.21 K/UL (ref 0–0.51)
EOSINOPHIL NFR BLD: 3.2 % (ref 0–6.9)
ERYTHROCYTE [DISTWIDTH] IN BLOOD BY AUTOMATED COUNT: 46.5 FL (ref 35.9–50)
FASTING STATUS PATIENT QL REPORTED: NORMAL
FOLATE SERPL-MCNC: 8.4 NG/ML
GLOBULIN SER CALC-MCNC: 3.6 G/DL (ref 1.9–3.5)
GLUCOSE SERPL-MCNC: 84 MG/DL (ref 65–99)
HCT VFR BLD AUTO: 45.9 % (ref 37–47)
HDLC SERPL-MCNC: 74 MG/DL
HGB BLD-MCNC: 14.6 G/DL (ref 12–16)
IMM GRANULOCYTES # BLD AUTO: 0.02 K/UL (ref 0–0.11)
IMM GRANULOCYTES NFR BLD AUTO: 0.3 % (ref 0–0.9)
LDLC SERPL CALC-MCNC: 237 MG/DL
LYMPHOCYTES # BLD AUTO: 1.77 K/UL (ref 1–4.8)
LYMPHOCYTES NFR BLD: 26.8 % (ref 22–41)
MCH RBC QN AUTO: 27.5 PG (ref 27–33)
MCHC RBC AUTO-ENTMCNC: 31.8 G/DL (ref 33.6–35)
MCV RBC AUTO: 86.6 FL (ref 81.4–97.8)
MONOCYTES # BLD AUTO: 0.37 K/UL (ref 0–0.85)
MONOCYTES NFR BLD AUTO: 5.6 % (ref 0–13.4)
NEUTROPHILS # BLD AUTO: 4.19 K/UL (ref 2–7.15)
NEUTROPHILS NFR BLD: 63.3 % (ref 44–72)
NRBC # BLD AUTO: 0 K/UL
NRBC BLD-RTO: 0 /100 WBC
PLATELET # BLD AUTO: 315 K/UL (ref 164–446)
PMV BLD AUTO: 9.1 FL (ref 9–12.9)
POTASSIUM SERPL-SCNC: 3.8 MMOL/L (ref 3.6–5.5)
PROT SERPL-MCNC: 7.9 G/DL (ref 6–8.2)
RBC # BLD AUTO: 5.3 M/UL (ref 4.2–5.4)
SODIUM SERPL-SCNC: 137 MMOL/L (ref 135–145)
T4 FREE SERPL-MCNC: 0.83 NG/DL (ref 0.53–1.43)
TRIGL SERPL-MCNC: 104 MG/DL (ref 0–149)
TSH SERPL DL<=0.005 MIU/L-ACNC: 2.47 UIU/ML (ref 0.38–5.33)
VIT B12 SERPL-MCNC: 214 PG/ML (ref 211–911)
WBC # BLD AUTO: 6.6 K/UL (ref 4.8–10.8)

## 2020-03-17 PROCEDURE — 82607 VITAMIN B-12: CPT

## 2020-03-17 PROCEDURE — 84443 ASSAY THYROID STIM HORMONE: CPT

## 2020-03-17 PROCEDURE — 36415 COLL VENOUS BLD VENIPUNCTURE: CPT

## 2020-03-17 PROCEDURE — 84439 ASSAY OF FREE THYROXINE: CPT

## 2020-03-17 PROCEDURE — 80061 LIPID PANEL: CPT

## 2020-03-17 PROCEDURE — 82746 ASSAY OF FOLIC ACID SERUM: CPT

## 2020-03-17 PROCEDURE — 80053 COMPREHEN METABOLIC PANEL: CPT

## 2020-03-17 PROCEDURE — 85025 COMPLETE CBC W/AUTO DIFF WBC: CPT

## 2020-10-12 ENCOUNTER — OFFICE VISIT (OUTPATIENT)
Dept: URGENT CARE | Facility: CLINIC | Age: 25
End: 2020-10-12
Payer: COMMERCIAL

## 2020-10-12 ENCOUNTER — HOSPITAL ENCOUNTER (OUTPATIENT)
Facility: MEDICAL CENTER | Age: 25
End: 2020-10-12
Attending: FAMILY MEDICINE
Payer: COMMERCIAL

## 2020-10-12 VITALS
WEIGHT: 269 LBS | TEMPERATURE: 97.6 F | RESPIRATION RATE: 18 BRPM | HEART RATE: 85 BPM | HEIGHT: 63 IN | OXYGEN SATURATION: 99 % | DIASTOLIC BLOOD PRESSURE: 70 MMHG | SYSTOLIC BLOOD PRESSURE: 118 MMHG | BODY MASS INDEX: 47.66 KG/M2

## 2020-10-12 DIAGNOSIS — R42 LIGHTHEADEDNESS: ICD-10-CM

## 2020-10-12 DIAGNOSIS — N30.00 ACUTE CYSTITIS WITHOUT HEMATURIA: ICD-10-CM

## 2020-10-12 DIAGNOSIS — R11.0 NAUSEA: ICD-10-CM

## 2020-10-12 LAB
APPEARANCE UR: NORMAL
BILIRUB UR STRIP-MCNC: NORMAL MG/DL
COLOR UR AUTO: NORMAL
GLUCOSE UR STRIP.AUTO-MCNC: NORMAL MG/DL
INT CON NEG: NEGATIVE
INT CON POS: POSITIVE
KETONES UR STRIP.AUTO-MCNC: NORMAL MG/DL
LEUKOCYTE ESTERASE UR QL STRIP.AUTO: NORMAL
NITRITE UR QL STRIP.AUTO: NORMAL
PH UR STRIP.AUTO: 5.5 [PH] (ref 5–8)
POC URINE PREGNANCY TEST: NORMAL
PROT UR QL STRIP: NORMAL MG/DL
RBC UR QL AUTO: NORMAL
SP GR UR STRIP.AUTO: 1.03
UROBILINOGEN UR STRIP-MCNC: 0.2 MG/DL

## 2020-10-12 PROCEDURE — 81025 URINE PREGNANCY TEST: CPT | Performed by: FAMILY MEDICINE

## 2020-10-12 PROCEDURE — 87086 URINE CULTURE/COLONY COUNT: CPT

## 2020-10-12 PROCEDURE — 81002 URINALYSIS NONAUTO W/O SCOPE: CPT | Performed by: FAMILY MEDICINE

## 2020-10-12 PROCEDURE — 87077 CULTURE AEROBIC IDENTIFY: CPT

## 2020-10-12 PROCEDURE — 99203 OFFICE O/P NEW LOW 30 MIN: CPT | Performed by: FAMILY MEDICINE

## 2020-10-12 PROCEDURE — 87186 SC STD MICRODIL/AGAR DIL: CPT

## 2020-10-12 RX ORDER — NITROFURANTOIN 25; 75 MG/1; MG/1
100 CAPSULE ORAL EVERY 12 HOURS
Qty: 10 CAP | Refills: 0 | Status: SHIPPED | OUTPATIENT
Start: 2020-10-12 | End: 2020-10-17

## 2020-10-12 RX ORDER — ONDANSETRON 4 MG/1
4 TABLET, FILM COATED ORAL EVERY 4 HOURS PRN
Qty: 10 TAB | Refills: 0 | Status: SHIPPED | OUTPATIENT
Start: 2020-10-12 | End: 2020-10-15

## 2020-10-12 RX ORDER — FLUOXETINE HYDROCHLORIDE 40 MG/1
40 CAPSULE ORAL DAILY
COMMUNITY
End: 2021-10-27

## 2020-10-12 ASSESSMENT — ENCOUNTER SYMPTOMS
ROS GI COMMENTS: 1
COUGH: 0
CHILLS: 0
FEVER: 0
ABDOMINAL PAIN: 0
SORE THROAT: 0
NAUSEA: 1
DIZZINESS: 1
VOMITING: 1
MYALGIAS: 0
SHORTNESS OF BREATH: 0

## 2020-10-12 ASSESSMENT — FIBROSIS 4 INDEX: FIB4 SCORE: 0.29

## 2020-10-12 NOTE — LETTER
October 12, 2020         Patient: Flori Car   YOB: 1995   Date of Visit: 10/12/2020           To Whom it May Concern:    Flori Car was seen in my clinic on 10/12/2020. She may return to work on 10/14/2020.    If you have any questions or concerns, please don't hesitate to call.        Sincerely,           [facility]  Electronically Signed

## 2020-10-13 NOTE — PATIENT INSTRUCTIONS
Food Poisoning  Food poisoning is an illness that is caused by eating or drinking contaminated foods or drinks. In most cases, food poisoning is mild and lasts 1-2 days. However, some cases can be serious, especially for people who have weak body defense systems (immune systems), older people, children and infants, and pregnant women.  What are the causes?  This condition is caused by contaminated food. Foods can become contaminated with viruses, bacteria, parasites, or mold due to:  · Poor personal hygiene, such as poor hand-washing practices.  · Storing food improperly, such as not refrigerating raw meat.  · Using unclean surfaces for preparing, serving, and storing food.  · Cooking or eating with unclean utensils.  If contaminated food is eaten, viruses, bacteria, or parasites can harm the intestine. This often causes severe diarrhea. The most common causes of food poisoning include:  · Viruses, such as:  ? Norovirus.  ? Rotavirus.  · Bacteria, such as:  ? Salmonella.  ? Listeria.  ? E. coli (Escherichia coli).  · Parasites, such as:  ? Giardia.  ? Toxoplasma gondii.  What are the signs or symptoms?  Symptoms may take several hours to appear after you consume contaminated food or drink. Symptoms include:  · Nausea.  · Vomiting.  · Cramping.  · Diarrhea.  · Fever and chills.  · Muscle aches.  · Dehydration. Dehydration can cause you to be tired and thirsty, have a dry mouth, and urinate less frequently.  How is this diagnosed?  Your health care provider can diagnose food poisoning with your medical history and a physical exam. This will include asking you what you have recently eaten. You may also have tests, including:  · Blood tests.  · Stool tests.  How is this treated?  Treatment focuses on relieving your symptoms and making sure that you are hydrated. You may also be given medicines. In severe cases, hospitalization may be required and you may need to receive fluids through an IV.  Follow these instructions  at home:  Eating and drinking    · Drink enough fluids to keep your urine pale yellow. You may need to drink small amounts of clear liquids frequently.  · Avoid milk, caffeine, and alcohol.  · Ask your health care provider for specific rehydration instructions.  · Eat small, frequent meals rather than large meals.  Medicines  · Take over-the-counter and prescription medicines only as told by your health care provider. Ask your health care provider if you should continue to take any of your regular prescribed and over-the-counter medicines.  · If you were prescribed an antibiotic medicine, take it as told by your health care provider. Do not stop taking the antibiotic even if you start to feel better.  General instructions    · Wash your hands thoroughly before you prepare food and after you go to the bathroom (use the toilet). Make sure that the people who live with you also wash their hands often.  · Rest at home until you feel better.  · Clean surfaces that you touch with a product that contains chlorine bleach.  · Keep all follow-up visits as told by your health care provider. This is important.  How is this prevented?  · Wash your hands, food preparation surfaces, and utensils thoroughly before and after you handle raw foods.  · Use separate food preparation surfaces and storage spaces for raw meat and for fruits and vegetables.  · Keep refrigerated foods colder than 40°F (5°C).  · Serve hot foods immediately or keep them heated above 140°F (60°C).  · Store dry foods in cool, dry spaces away from excess heat or moisture. Throw out any foods that do not smell right or are in cans that are bulging.  · Follow approved michael procedures.  · Heat canned foods thoroughly before you taste them.  · Drink bottled or sterile water when you travel.  Get help right away if:  · You have difficulty breathing, swallowing, talking, or moving.  · You develop blurred vision.  · You cannot eat or drink without vomiting.  · You  faint.  · Your eyes turn yellow.  · Your vomiting or diarrhea is persistent.  · Abdominal pain develops, increases, or localizes in one small area.  · You have a fever.  · You have blood or mucus in your stools, or your stools look dark black and tarry.  · You have signs of dehydration, such as:  ? Dark urine, very little urine, or no urine.  ? Cracked lips.  ? Not making tears while crying.  ? Dry mouth.  ? Sunken eyes.  ? Sleepiness.  ? Weakness.  ? Dizziness.  These symptoms may represent a serious problem that is an emergency. Do not wait to see if the symptoms will go away. Get medical help right away. Call your local emergency services (911 in the [place] Do not drive yourself to the hospital.  Summary  · Food poisoning is an illness that is caused by eating or drinking contaminated foods or drinks.  · Symptoms may include nausea, vomiting, diarrhea, muscle aches, cramping, fever, chills, and dehydration.  · In most cases, food poisoning is mild and lasts 1-2 days.  · In severe cases, hospitalization may be required.  This information is not intended to replace advice given to you by your health care provider. Make sure you discuss any questions you have with your health care provider.  Document Released: 09/15/2005 Document Revised: 10/02/2019 Document Reviewed: 10/02/2019  [employer] Patient Education © 2020 [employer]      Urinary Tract Infection, Adult  A urinary tract infection (UTI) is an infection of any part of the urinary tract. The urinary tract includes:  · The kidneys.  · The ureters.  · The bladder.  · The urethra.  These organs make, store, and get rid of pee (urine) in the body.  What are the causes?  This is caused by germs (bacteria) in your genital area. These germs grow and cause swelling (inflammation) of your urinary tract.  What increases the risk?  You are more likely to develop this condition if:  · You have a small, thin tube (catheter) to drain pee.  · You cannot control when you pee  or poop (incontinence).  · You are female, and:  ? You use these methods to prevent pregnancy:  ? A medicine that kills sperm (spermicide).  ? A device that blocks sperm (diaphragm).  ? You have low levels of a female hormone (estrogen).  ? You are pregnant.  · You have genes that add to your risk.  · You are sexually active.  · You take antibiotic medicines.  · You have trouble peeing because of:  ? A prostate that is bigger than normal, if you are male.  ? A blockage in the part of your body that drains pee from the bladder (urethra).  ? A kidney stone.  ? A nerve condition that affects your bladder (neurogenic bladder).  ? Not getting enough to drink.  ? Not peeing often enough.  · You have other conditions, such as:  ? Diabetes.  ? A weak disease-fighting system (immune system).  ? Sickle cell disease.  ? Gout.  ? Injury of the spine.  What are the signs or symptoms?  Symptoms of this condition include:  · Needing to pee right away (urgently).  · Peeing often.  · Peeing small amounts often.  · Pain or burning when peeing.  · Blood in the pee.  · Pee that smells bad or not like normal.  · Trouble peeing.  · Pee that is cloudy.  · Fluid coming from the vagina, if you are female.  · Pain in the belly or lower back.  Other symptoms include:  · Throwing up (vomiting).  · No urge to eat.  · Feeling mixed up (confused).  · Being tired and grouchy (irritable).  · A fever.  · Watery poop (diarrhea).  How is this treated?  This condition may be treated with:  · Antibiotic medicine.  · Other medicines.  · Drinking enough water.  Follow these instructions at home:    Medicines  · Take over-the-counter and prescription medicines only as told by your doctor.  · If you were prescribed an antibiotic medicine, take it as told by your doctor. Do not stop taking it even if you start to feel better.  General instructions  · Make sure you:  ? Pee until your bladder is empty.  ? Do not hold pee for a long time.  ? Empty your bladder  after sex.  ? Wipe from front to back after pooping if you are a female. Use each tissue one time when you wipe.  · Drink enough fluid to keep your pee pale yellow.  · Keep all follow-up visits as told by your doctor. This is important.  Contact a doctor if:  · You do not get better after 1-2 days.  · Your symptoms go away and then come back.  Get help right away if:  · You have very bad back pain.  · You have very bad pain in your lower belly.  · You have a fever.  · You are sick to your stomach (nauseous).  · You are throwing up.  Summary  · A urinary tract infection (UTI) is an infection of any part of the urinary tract.  · This condition is caused by germs in your genital area.  · There are many risk factors for a UTI. These include having a small, thin tube to drain pee and not being able to control when you pee or poop.  · Treatment includes antibiotic medicines for germs.  · Drink enough fluid to keep your pee pale yellow.  This information is not intended to replace advice given to you by your health care provider. Make sure you discuss any questions you have with your health care provider.  Document Released: 06/05/2009 Document Revised: 12/05/2019 Document Reviewed: 06/27/2019  [employer] Patient Education © 2020 [employer]

## 2020-10-13 NOTE — PROGRESS NOTES
Subjective:   Flori Car is a 25 y.o. female who presents for GI Problem (10/8) and Other (lightheaded off and on for couple days)        GI Problem  This is a new problem. The current episode started yesterday. The problem occurs intermittently. Associated symptoms include nausea and vomiting (one episode last night, resolved now). Pertinent negatives include no abdominal pain, chills, coughing, fever, myalgias, rash, sore throat or urinary symptoms. Exacerbated by: eating suspect food the evening prior to onset of symptoms. She has tried drinking for the symptoms. The treatment provided no relief.   Other  Associated symptoms include nausea and vomiting (one episode last night, resolved now). Pertinent negatives include no abdominal pain, chills, coughing, fever, myalgias, rash, sore throat or urinary symptoms.     PMH:  has a past medical history of Asthma, Postpartum depression, postpartum condition (12/5/2018), and Preeclampsia, severe, third trimester (11/11/2018). She also has no past medical history of Addisons disease ([facility]), Adrenal disorder ([facility]), Allergy, Anemia, Anxiety, Arrhythmia, Arthritis, Blood transfusion without reported diagnosis, Cancer ([facility]), Cataract, CHF (congestive heart failure) ([facility]), Clotting disorder ([facility]), COPD (chronic obstructive pulmonary disease) ([facility]), Cushings syndrome ([facility]), Diabetes ([facility]), Diabetic neuropathy (HCC), GERD (gastroesophageal reflux disease), Glaucoma, Goiter, Head ache, Heart attack ([facility]), Heart murmur, HIV (human immunodeficiency virus infection) ([facility]), Hyperlipidemia, IBD (inflammatory bowel disease), Kidney disease, Meningitis, Migraine, Muscle disorder, Osteoporosis, Parathyroid disorder (HCC), Pituitary disease (HCC), Pulmonary emphysema (HCC), Seizure (HCC), Sickle cell disease (HCC), Stroke (HCC), Substance abuse (HCC), Thyroid disease, Tuberculosis, or Urinary tract infection.  MEDS:   Current Outpatient Medications:   •  fluoxetine (PROZAC) 40  MG capsule, Take 40 mg by mouth every day., Disp: , Rfl:   •  ondansetron (ZOFRAN) 4 MG Tab tablet, Take 1 Tab by mouth every four hours as needed for Nausea/Vomiting for up to 3 days., Disp: 10 Tab, Rfl: 0  •  nitrofurantoin (MACROBID) 100 MG Cap, Take 1 Cap by mouth every 12 hours for 5 days., Disp: 10 Cap, Rfl: 0  •  azithromycin (ZITHROMAX) 250 MG Tab, Take two tabs po day one followed by one tab po day two through five with food (Patient not taking: Reported on 10/12/2020), Disp: 6 Tab, Rfl: 0  •  Prenatal Vit-Fe Fumarate-FA (PRENATAL 1+1 PO), Take  by mouth., Disp: , Rfl:   •  fluticasone (FLONASE) 50 MCG/ACT nasal spray, Spray 1 Spray in nose every day., Disp: , Rfl:   ALLERGIES: No Known Allergies  SURGHX:   Past Surgical History:   Procedure Laterality Date   • PRIMARY C SECTION Bilateral 11/7/2018    Procedure: PRIMARY C SECTION;  Surgeon: Carlos Fong M.D.;  Location: LABOR AND DELIVERY;  Service: Obstetrics     SOCHX:  reports that she has never smoked. She has never used smokeless tobacco. She reports that she does not drink alcohol or use drugs.  FH:   Family History   Adopted: Yes   Problem Relation Age of Onset   • Obesity Mother    • Drug abuse Mother    • Depression Mother    • Cancer Mother    • Drug abuse Father    • Obesity Father    • Depression Father    • Cancer Maternal Aunt    • Hypertension Maternal Aunt    • Anemia Maternal Aunt    • Cancer Maternal Grandmother    • Obesity Maternal Grandmother      Review of Systems   Constitutional: Negative for chills and fever.   HENT: Negative for sore throat.    Respiratory: Negative for cough and shortness of breath.    Gastrointestinal: Positive for nausea and vomiting (one episode last night, resolved now). Negative for abdominal pain.        1   Genitourinary: Negative for dysuria.   Musculoskeletal: Negative for myalgias.   Skin: Negative for rash.   Neurological: Positive for dizziness.        Objective:   /70 (BP Location: Right  "arm, Patient Position: Sitting, BP Cuff Size: Large adult)   Pulse 85   Temp 36.4 °C (97.6 °F) (Temporal)   Resp 18   Ht 1.6 m (5' 3\")   Wt 122 kg (269 lb)   SpO2 99%   BMI 47.65 kg/m²   Physical Exam  Vitals signs and nursing note reviewed.   Constitutional:       General: She is not in acute distress.     Appearance: She is well-developed.   HENT:      Head: Normocephalic and atraumatic.      Right Ear: External ear normal.      Left Ear: External ear normal.      Nose: Nose normal.      Mouth/Throat:      Mouth: Mucous membranes are moist.   Eyes:      Conjunctiva/sclera: Conjunctivae normal.   Cardiovascular:      Rate and Rhythm: Normal rate.   Pulmonary:      Effort: Pulmonary effort is normal. No respiratory distress.      Breath sounds: Normal breath sounds.   Abdominal:      General: Bowel sounds are increased. There is no distension.      Tenderness: There is abdominal tenderness in the suprapubic area. There is no right CVA tenderness, left CVA tenderness, guarding or rebound.   Musculoskeletal: Normal range of motion.   Skin:     General: Skin is warm and dry.   Neurological:      General: No focal deficit present.      Mental Status: She is alert and oriented to person, place, and time. Mental status is at baseline.      Gait: Gait (gait at baseline) normal.   Psychiatric:         Judgment: Judgment normal.           Assessment/Plan:   1. Lightheadedness  - POCT Urinalysis  - POCT Pregnancy    2. Nausea  - ondansetron (ZOFRAN) 4 MG Tab tablet; Take 1 Tab by mouth every four hours as needed for Nausea/Vomiting for up to 3 days.  Dispense: 10 Tab; Refill: 0    3. Acute cystitis without hematuria  - POCT Urinalysis  - Urine Culture; Future  - nitrofurantoin (MACROBID) 100 MG Cap; Take 1 Cap by mouth every 12 hours for 5 days.  Dispense: 10 Cap; Refill: 0    Other orders  - fluoxetine (PROZAC) 40 MG capsule; Take 40 mg by mouth every day.      Discussed close monitoring, return precautions, and " supportive measures of maintaining adequate fluid hydration and caloric intake, relative rest and symptom management as needed for pain and/or fever.    Differential diagnosis, natural history, supportive care, and indications for immediate follow-up discussed.     Advised the patient to follow-up with the primary care physician for recheck, reevaluation, and consideration of further management.    Please note that this dictation was created using voice recognition software. I have worked with consultants from the vendor as well as technical experts from [employer] to optimize the interface. I have made every reasonable attempt to correct obvious errors, but I expect that there are errors of grammar and possibly content that I did not discover before finalizing the note.

## 2020-10-15 LAB
BACTERIA UR CULT: ABNORMAL
BACTERIA UR CULT: ABNORMAL
SIGNIFICANT IND 70042: ABNORMAL
SITE SITE: ABNORMAL
SOURCE SOURCE: ABNORMAL

## 2021-10-20 ENCOUNTER — TELEPHONE (OUTPATIENT)
Dept: SCHEDULING | Facility: IMAGING CENTER | Age: 26
End: 2021-10-20

## 2021-10-27 ENCOUNTER — OFFICE VISIT (OUTPATIENT)
Dept: MEDICAL GROUP | Facility: LAB | Age: 26
End: 2021-10-27
Payer: COMMERCIAL

## 2021-10-27 VITALS
TEMPERATURE: 97.8 F | BODY MASS INDEX: 45.62 KG/M2 | HEIGHT: 63 IN | RESPIRATION RATE: 16 BRPM | OXYGEN SATURATION: 95 % | DIASTOLIC BLOOD PRESSURE: 72 MMHG | HEART RATE: 75 BPM | WEIGHT: 257.5 LBS | SYSTOLIC BLOOD PRESSURE: 122 MMHG

## 2021-10-27 DIAGNOSIS — E66.01 MORBID OBESITY WITH BMI OF 45.0-49.9, ADULT (HCC): Primary | ICD-10-CM

## 2021-10-27 DIAGNOSIS — Z23 NEED FOR VACCINATION: ICD-10-CM

## 2021-10-27 DIAGNOSIS — Z00.00 PREVENTATIVE HEALTH CARE: ICD-10-CM

## 2021-10-27 PROBLEM — I10 HTN (HYPERTENSION): Status: RESOLVED | Noted: 2019-02-15 | Resolved: 2021-10-27

## 2021-10-27 PROBLEM — Z86.69 HISTORY OF RETINAL TEAR: Status: ACTIVE | Noted: 2021-10-27

## 2021-10-27 PROBLEM — Z3A.34 34 WEEKS GESTATION OF PREGNANCY: Status: RESOLVED | Noted: 2018-11-12 | Resolved: 2021-10-27

## 2021-10-27 PROBLEM — Z3A.32 32 WEEKS GESTATION OF PREGNANCY: Status: RESOLVED | Noted: 2018-10-23 | Resolved: 2021-10-27

## 2021-10-27 PROBLEM — O09.30 LATE PRENATAL CARE: Status: RESOLVED | Noted: 2018-10-23 | Resolved: 2021-10-27

## 2021-10-27 PROBLEM — Z3A.33 PREGNANCY WITH 33 COMPLETED WEEKS GESTATION: Status: RESOLVED | Noted: 2018-10-31 | Resolved: 2021-10-27

## 2021-10-27 PROBLEM — O14.13 PREECLAMPSIA, SEVERE, THIRD TRIMESTER: Status: RESOLVED | Noted: 2018-11-11 | Resolved: 2021-10-27

## 2021-10-27 PROBLEM — Z01.30 BLOOD PRESSURE CHECK: Status: RESOLVED | Noted: 2018-12-05 | Resolved: 2021-10-27

## 2021-10-27 PROBLEM — Z09 POSTOP CHECK: Status: RESOLVED | Noted: 2018-11-12 | Resolved: 2021-10-27

## 2021-10-27 PROCEDURE — 90471 IMMUNIZATION ADMIN: CPT | Performed by: PHYSICIAN ASSISTANT

## 2021-10-27 PROCEDURE — 90686 IIV4 VACC NO PRSV 0.5 ML IM: CPT | Performed by: PHYSICIAN ASSISTANT

## 2021-10-27 PROCEDURE — 99213 OFFICE O/P EST LOW 20 MIN: CPT | Mod: 25 | Performed by: PHYSICIAN ASSISTANT

## 2021-10-27 ASSESSMENT — FIBROSIS 4 INDEX: FIB4 SCORE: 0.3

## 2021-10-27 NOTE — PROGRESS NOTES
"Subjective:   Flori Car is a 26 y.o. female here today for referral to bariatric surgery     Morbid obesity with BMI of 45.0-49.9, adult ([facility])  New to me;   Reports that she is interested in completing gastric sleeve procedure.   She states that she previously has a consultation by bariatrics about 1 year ago.   Ready to revisit this.        Current medicines (including changes today)  Current Outpatient Medications   Medication Sig Dispense Refill   • fluticasone (FLONASE) 50 MCG/ACT nasal spray Spray 1 Spray in nose every day.       No current facility-administered medications for this visit.     She  has a past medical history of Asthma, History of retinal tear (10/27/2021), Postpartum depression, postpartum condition (12/5/2018), and Preeclampsia, severe, third trimester (11/11/2018). She also has no past medical history of Addisons disease ([facility]), Adrenal disorder ([facility]), Allergy, Anemia, Anxiety, Arrhythmia, Arthritis, Blood transfusion without reported diagnosis, Cancer ([facility]), Cataract, CHF (congestive heart failure) ([facility]), Clotting disorder ([facility]), COPD (chronic obstructive pulmonary disease) ([facility]), Cushings syndrome ([facility]), Diabetes ([facility]), Diabetic neuropathy ([facility]), GERD (gastroesophageal reflux disease), Glaucoma, Goiter, Head ache, Heart attack ([facility]), Heart murmur, HIV (human immunodeficiency virus infection) ([facility]), Hyperlipidemia, IBD (inflammatory bowel disease), Kidney disease, Meningitis, Migraine, Muscle disorder, Osteoporosis, Parathyroid disorder ([facility]), Pituitary disease ([facility]), Pulmonary emphysema ([facility]), Seizure ([facility]), Sickle cell disease ([facility]), Stroke ([facility]), Substance abuse ([facility]), Thyroid disease, Tuberculosis, or Urinary tract infection.    ROS   No chest pain, no shortness of breath, no abdominal pain       Objective:     /72 (BP Location: Left arm, Patient Position: Sitting, BP Cuff Size: Large adult)   Pulse 75   Temp 36.6 °C (97.8 °F) (Temporal)   Resp 16   Ht 1.6 m (5' 3\")  "  Wt 117 kg (257 lb 8 oz)   SpO2 95%  Body mass index is 45.61 kg/m².   Physical Exam:  Constitutional: Alert, no distress.  Skin: Warm, dry, good turgor, no rashes in visible areas.  Eye: Equal, round, conjunctiva clear, lids normal.  Neck: Trachea midline, no masses, no thyromegaly. No cervical or supraclavicular lymphadenopathy  Respiratory: Unlabored respiratory effort, lungs clear to auscultation, no wheezes, no ronchi.  Cardiovascular: Normal S1, S2, no murmur, no edema.  Psych: Alert and oriented x3, normal affect and mood.    Assessment and Plan:   The following treatment plan was discussed    1. Morbid obesity with BMI of 45.0-49.9, adult (HCC)  BMI was assessed today and reviewed with patient as meeting criteria for the risk factor obesity.  Willing to change as well as barriers were assessed. A collaborative plan was made with the patient and goals were set. Assistance was discussed, including self-help and more formal medical adjunctive treatments.     Wt Readings from Last 3 Encounters:   10/27/21 117 kg (257 lb 8 oz)   10/12/20 122 kg (269 lb)   09/30/19 112 kg (248 lb)   - REFERRAL TO BARIATRIC SURGERY    2. Preventative health care  - CBC WITH DIFFERENTIAL; Future  - Comp Metabolic Panel; Future  - Lipid Profile; Future  - HEMOGLOBIN A1C; Future  - TSH WITH REFLEX TO FT4; Future    3. Need for vaccination  - INFLUENZA VACCINE QUAD INJ (PF)    New to me; has been seen by [facility] w/in the past 3 years.   Followup: Return if symptoms worsen or fail to improve.       Ruma Mejia P.A.-C.  Supervising MD: Dr. Mehran Sánchez MD  10/27/21

## 2021-10-27 NOTE — ASSESSMENT & PLAN NOTE
New to me;   Reports that she is interested in completing gastric sleeve procedure.   She states that she previously has a consultation by bariatrics about 1 year ago.   Ready to revisit this.

## 2024-09-12 ENCOUNTER — HOSPITAL ENCOUNTER (EMERGENCY)
Facility: MEDICAL CENTER | Age: 29
End: 2024-09-12
Attending: EMERGENCY MEDICINE
Payer: COMMERCIAL

## 2024-09-12 ENCOUNTER — PHARMACY VISIT (OUTPATIENT)
Dept: PHARMACY | Facility: MEDICAL CENTER | Age: 29
End: 2024-09-12
Payer: COMMERCIAL

## 2024-09-12 VITALS
BODY MASS INDEX: 47.23 KG/M2 | HEIGHT: 63 IN | RESPIRATION RATE: 18 BRPM | WEIGHT: 266.54 LBS | HEART RATE: 84 BPM | SYSTOLIC BLOOD PRESSURE: 140 MMHG | DIASTOLIC BLOOD PRESSURE: 71 MMHG | TEMPERATURE: 97 F | OXYGEN SATURATION: 97 %

## 2024-09-12 DIAGNOSIS — R53.83 OTHER FATIGUE: ICD-10-CM

## 2024-09-12 DIAGNOSIS — N30.00 ACUTE CYSTITIS WITHOUT HEMATURIA: ICD-10-CM

## 2024-09-12 LAB
ALBUMIN SERPL BCP-MCNC: 3.9 G/DL (ref 3.2–4.9)
ALBUMIN/GLOB SERPL: 1.1 G/DL
ALP SERPL-CCNC: 69 U/L (ref 30–99)
ALT SERPL-CCNC: 13 U/L (ref 2–50)
AMORPH CRY #/AREA URNS HPF: PRESENT /HPF
ANION GAP SERPL CALC-SCNC: 12 MMOL/L (ref 7–16)
APPEARANCE UR: ABNORMAL
AST SERPL-CCNC: 20 U/L (ref 12–45)
BACTERIA #/AREA URNS HPF: ABNORMAL /HPF
BASOPHILS # BLD AUTO: 0.6 % (ref 0–1.8)
BASOPHILS # BLD: 0.05 K/UL (ref 0–0.12)
BILIRUB SERPL-MCNC: 0.3 MG/DL (ref 0.1–1.5)
BILIRUB UR QL STRIP.AUTO: NEGATIVE
BUN SERPL-MCNC: 11 MG/DL (ref 8–22)
CALCIUM ALBUM COR SERPL-MCNC: 9.1 MG/DL (ref 8.5–10.5)
CALCIUM SERPL-MCNC: 9 MG/DL (ref 8.5–10.5)
CHLORIDE SERPL-SCNC: 107 MMOL/L (ref 96–112)
CO2 SERPL-SCNC: 21 MMOL/L (ref 20–33)
COLOR UR: YELLOW
CREAT SERPL-MCNC: 0.87 MG/DL (ref 0.5–1.4)
EKG IMPRESSION: NORMAL
EOSINOPHIL # BLD AUTO: 0.11 K/UL (ref 0–0.51)
EOSINOPHIL NFR BLD: 1.4 % (ref 0–6.9)
EPI CELLS #/AREA URNS HPF: ABNORMAL /HPF
ERYTHROCYTE [DISTWIDTH] IN BLOOD BY AUTOMATED COUNT: 44 FL (ref 35.9–50)
GFR SERPLBLD CREATININE-BSD FMLA CKD-EPI: 93 ML/MIN/1.73 M 2
GLOBULIN SER CALC-MCNC: 3.5 G/DL (ref 1.9–3.5)
GLUCOSE SERPL-MCNC: 81 MG/DL (ref 65–99)
GLUCOSE UR STRIP.AUTO-MCNC: NEGATIVE MG/DL
HCG SERPL QL: NEGATIVE
HCT VFR BLD AUTO: 44.3 % (ref 37–47)
HGB BLD-MCNC: 14.5 G/DL (ref 12–16)
HYALINE CASTS #/AREA URNS LPF: ABNORMAL /LPF
IMM GRANULOCYTES # BLD AUTO: 0.02 K/UL (ref 0–0.11)
IMM GRANULOCYTES NFR BLD AUTO: 0.3 % (ref 0–0.9)
KETONES UR STRIP.AUTO-MCNC: NEGATIVE MG/DL
LEUKOCYTE ESTERASE UR QL STRIP.AUTO: ABNORMAL
LIPASE SERPL-CCNC: 38 U/L (ref 11–82)
LYMPHOCYTES # BLD AUTO: 2.35 K/UL (ref 1–4.8)
LYMPHOCYTES NFR BLD: 29.7 % (ref 22–41)
MCH RBC QN AUTO: 29.1 PG (ref 27–33)
MCHC RBC AUTO-ENTMCNC: 32.7 G/DL (ref 32.2–35.5)
MCV RBC AUTO: 89 FL (ref 81.4–97.8)
MICRO URNS: ABNORMAL
MONOCYTES # BLD AUTO: 0.48 K/UL (ref 0–0.85)
MONOCYTES NFR BLD AUTO: 6.1 % (ref 0–13.4)
NEUTROPHILS # BLD AUTO: 4.89 K/UL (ref 1.82–7.42)
NEUTROPHILS NFR BLD: 61.9 % (ref 44–72)
NITRITE UR QL STRIP.AUTO: POSITIVE
NRBC # BLD AUTO: 0 K/UL
NRBC BLD-RTO: 0 /100 WBC (ref 0–0.2)
PH UR STRIP.AUTO: 5 [PH] (ref 5–8)
PLATELET # BLD AUTO: 304 K/UL (ref 164–446)
PMV BLD AUTO: 9.4 FL (ref 9–12.9)
POTASSIUM SERPL-SCNC: 4 MMOL/L (ref 3.6–5.5)
PROT SERPL-MCNC: 7.4 G/DL (ref 6–8.2)
PROT UR QL STRIP: NEGATIVE MG/DL
RBC # BLD AUTO: 4.98 M/UL (ref 4.2–5.4)
RBC # URNS HPF: ABNORMAL /HPF
RBC UR QL AUTO: ABNORMAL
SODIUM SERPL-SCNC: 140 MMOL/L (ref 135–145)
SP GR UR STRIP.AUTO: 1.02
TSH SERPL DL<=0.005 MIU/L-ACNC: 1.59 UIU/ML (ref 0.38–5.33)
UROBILINOGEN UR STRIP.AUTO-MCNC: 0.2 MG/DL
WBC # BLD AUTO: 7.9 K/UL (ref 4.8–10.8)
WBC #/AREA URNS HPF: ABNORMAL /HPF

## 2024-09-12 PROCEDURE — 93005 ELECTROCARDIOGRAM TRACING: CPT

## 2024-09-12 PROCEDURE — 84443 ASSAY THYROID STIM HORMONE: CPT

## 2024-09-12 PROCEDURE — RXMED WILLOW AMBULATORY MEDICATION CHARGE: Performed by: EMERGENCY MEDICINE

## 2024-09-12 PROCEDURE — 81001 URINALYSIS AUTO W/SCOPE: CPT

## 2024-09-12 PROCEDURE — 80053 COMPREHEN METABOLIC PANEL: CPT

## 2024-09-12 PROCEDURE — 36415 COLL VENOUS BLD VENIPUNCTURE: CPT

## 2024-09-12 PROCEDURE — 99284 EMERGENCY DEPT VISIT MOD MDM: CPT

## 2024-09-12 PROCEDURE — 83690 ASSAY OF LIPASE: CPT

## 2024-09-12 PROCEDURE — A9270 NON-COVERED ITEM OR SERVICE: HCPCS | Performed by: EMERGENCY MEDICINE

## 2024-09-12 PROCEDURE — 84703 CHORIONIC GONADOTROPIN ASSAY: CPT

## 2024-09-12 PROCEDURE — 93005 ELECTROCARDIOGRAM TRACING: CPT | Performed by: EMERGENCY MEDICINE

## 2024-09-12 PROCEDURE — 700102 HCHG RX REV CODE 250 W/ 637 OVERRIDE(OP): Performed by: EMERGENCY MEDICINE

## 2024-09-12 PROCEDURE — 85025 COMPLETE CBC W/AUTO DIFF WBC: CPT

## 2024-09-12 RX ORDER — NITROFURANTOIN 25; 75 MG/1; MG/1
100 CAPSULE ORAL ONCE
Status: COMPLETED | OUTPATIENT
Start: 2024-09-12 | End: 2024-09-12

## 2024-09-12 RX ORDER — NITROFURANTOIN 25; 75 MG/1; MG/1
100 CAPSULE ORAL 2 TIMES DAILY
Qty: 10 CAPSULE | Refills: 0 | Status: ACTIVE | OUTPATIENT
Start: 2024-09-12 | End: 2024-09-17

## 2024-09-12 RX ADMIN — NITROFURANTOIN MONOHYDRATE/MACROCRYSTALS 100 MG: 75; 25 CAPSULE ORAL at 15:32

## 2024-09-12 NOTE — ED TRIAGE NOTES
"Chief Complaint   Patient presents with    Fatigue     + lightheaded since this am.     Abdominal Pain     Described as \"tenderness,\" no pain with palpation. Mid lower AB, sometimes to bilateral AB near ovaries. Colicky in nature. Has IUD. - vaginal bleeding. Normal urination. - fever, - chills. Reports mild constipation. Hard stool x 3 days.      Pt ambulates from [facility] with steady gait. Skin signs normal color, warm, dry. Pt speaking full sentences, A & O x 4. Respirations equal and unlabored. Pt educated on triage process and to alert staff of any changing conditions. [facility]     BP (!) 163/97   Pulse 79   Temp 36.1 °C (97 °F) (Temporal)   Resp 16   Ht 1.6 m (5' 3\")   Wt 121 kg (266 lb 8.6 oz)   SpO2 99%   BMI 47.21 kg/m²       "

## 2024-09-12 NOTE — ED NOTES
Rounded with pt, denies needs/concerns at this time.  Remains on continuous monitoring, call light within reach.

## 2024-09-12 NOTE — ED PROVIDER NOTES
"ER Provider Note    Scribed for Alonso San M.D. by Rupinder Henning. 9/12/2024   1:49 PM    Primary Care Provider: Pcp Pt States None    CHIEF COMPLAINT  Chief Complaint   Patient presents with    Fatigue     + lightheaded since this am.     Abdominal Pain     Described as \"tenderness,\" no pain with palpation. Mid lower AB, sometimes to bilateral AB near ovaries. Colicky in nature. Has IUD. - vaginal bleeding. Normal urination. - fever, - chills. Reports mild constipation. Hard stool x 3 days.      HPI/ROS  LIMITATION TO HISTORY   Select: : None  OUTSIDE HISTORIAN(S):  None    Flori Car is a 29 y.o. female who presents to the ED for evaluation of lower abdominal pain onset three days ago. Patient is also complaining of generalized fatigue onset this morning. She has associated dizziness. She denies any vaginal bleeding, dysuria, hematuria, cough, runny nose, congestion, fever or chills. Patient reports that she has an IUD in place.     PAST MEDICAL HISTORY  Past Medical History:   Diagnosis Date    Asthma     History of retinal tear 10/27/2021    Postpartum depression, postpartum condition 12/5/2018    Preeclampsia, severe, third trimester 11/11/2018       SURGICAL HISTORY  Past Surgical History:   Procedure Laterality Date    PRIMARY C SECTION Bilateral 11/7/2018    Procedure: PRIMARY C SECTION;  Surgeon: Carlos Fong M.D.;  Location: LABOR AND DELIVERY;  Service: Obstetrics       FAMILY HISTORY  Family History   Adopted: Yes   Problem Relation Age of Onset    Obesity Mother     Drug abuse Mother     Depression Mother     Cancer Mother     Drug abuse Father     Obesity Father     Depression Father     Cancer Maternal Aunt     Hypertension Maternal Aunt     Anemia Maternal Aunt     Cancer Maternal Grandmother     Obesity Maternal Grandmother        SOCIAL HISTORY   reports that she has never smoked. She has never used smokeless tobacco. She reports current drug use. Drug: Inhaled. She " "reports that she does not drink alcohol.    CURRENT MEDICATIONS  Discharge Medication List as of 9/12/2024  3:26 PM        CONTINUE these medications which have NOT CHANGED    Details   fluticasone (FLONASE) 50 MCG/ACT nasal spray Spray 1 Spray in nose every day., Historical Med             ALLERGIES  No Known Allergies     PHYSICAL EXAM  BP (!) 163/97   Pulse 79   Temp 36.1 °C (97 °F) (Temporal)   Resp 16   Ht 1.6 m (5' 3\")   Wt 121 kg (266 lb 8.6 oz)   LMP 08/22/2024 Comment: IUD  SpO2 99%   BMI 47.21 kg/m²      Constitutional: Well developed, Well nourished, Mild distress, Elevated BMI  HENT: Normocephalic, Atraumatic, dry mucous membranes.   Eyes: PERRLA, EOMI, Conjunctiva normal, No discharge.   Neck: No tenderness, Supple, No stridor.   Cardiovascular: Normal heart rate, Normal rhythm.   Thorax & Lungs: Clear to auscultation bilaterally, No respiratory distress.   Abdomen: Soft, No tenderness, No masses.   Skin: Warm, Dry, No rash.    Musculoskeletal: No major deformities noted.  Neurologic: Awake, alert. Moves all extremities spontaneously.  Psychiatric: Affect normal, Judgment normal, Mood normal.       DIAGNOSTIC STUDIES    Labs:   Results for orders placed or performed during the hospital encounter of 09/12/24   CBC WITH DIFFERENTIAL   Result Value Ref Range    WBC 7.9 4.8 - 10.8 K/uL    RBC 4.98 4.20 - 5.40 M/uL    Hemoglobin 14.5 12.0 - 16.0 g/dL    Hematocrit 44.3 37.0 - 47.0 %    MCV 89.0 81.4 - 97.8 fL    MCH 29.1 27.0 - 33.0 pg    MCHC 32.7 32.2 - 35.5 g/dL    RDW 44.0 35.9 - 50.0 fL    Platelet Count 304 164 - 446 K/uL    MPV 9.4 9.0 - 12.9 fL    Neutrophils-Polys 61.90 44.00 - 72.00 %    Lymphocytes 29.70 22.00 - 41.00 %    Monocytes 6.10 0.00 - 13.40 %    Eosinophils 1.40 0.00 - 6.90 %    Basophils 0.60 0.00 - 1.80 %    Immature Granulocytes 0.30 0.00 - 0.90 %    Nucleated RBC 0.00 0.00 - 0.20 /100 WBC    Neutrophils (Absolute) 4.89 1.82 - 7.42 K/uL    Lymphs (Absolute) 2.35 1.00 - 4.80 " K/uL    Monos (Absolute) 0.48 0.00 - 0.85 K/uL    Eos (Absolute) 0.11 0.00 - 0.51 K/uL    Baso (Absolute) 0.05 0.00 - 0.12 K/uL    Immature Granulocytes (abs) 0.02 0.00 - 0.11 K/uL    NRBC (Absolute) 0.00 K/uL   COMP METABOLIC PANEL   Result Value Ref Range    Sodium 140 135 - 145 mmol/L    Potassium 4.0 3.6 - 5.5 mmol/L    Chloride 107 96 - 112 mmol/L    Co2 21 20 - 33 mmol/L    Anion Gap 12.0 7.0 - 16.0    Glucose 81 65 - 99 mg/dL    Bun 11 8 - 22 mg/dL    Creatinine 0.87 0.50 - 1.40 mg/dL    Calcium 9.0 8.5 - 10.5 mg/dL    Correct Calcium 9.1 8.5 - 10.5 mg/dL    AST(SGOT) 20 12 - 45 U/L    ALT(SGPT) 13 2 - 50 U/L    Alkaline Phosphatase 69 30 - 99 U/L    Total Bilirubin 0.3 0.1 - 1.5 mg/dL    Albumin 3.9 3.2 - 4.9 g/dL    Total Protein 7.4 6.0 - 8.2 g/dL    Globulin 3.5 1.9 - 3.5 g/dL    A-G Ratio 1.1 g/dL   LIPASE   Result Value Ref Range    Lipase 38 11 - 82 U/L   HCG QUAL SERUM   Result Value Ref Range    Beta-Hcg Qualitative Serum Negative Negative   URINALYSIS,CULTURE IF INDICATED    Specimen: Blood   Result Value Ref Range    Color Yellow     Character Cloudy (A)     Specific Gravity 1.025 <1.035    Ph 5.0 5.0 - 8.0    Glucose Negative Negative mg/dL    Ketones Negative Negative mg/dL    Protein Negative Negative mg/dL    Bilirubin Negative Negative    Urobilinogen, Urine 0.2 Negative    Nitrite Positive (A) Negative    Leukocyte Esterase Trace (A) Negative    Occult Blood Trace (A) Negative    Micro Urine Req Microscopic    URINE MICROSCOPIC (W/UA)   Result Value Ref Range    WBC 2-5 /hpf    RBC 2-5 (A) /hpf    Bacteria Many (A) None /hpf    Epithelial Cells Moderate (A) /hpf    Amorphous Crystal Present /hpf    Hyaline Cast 0-2 /lpf   ESTIMATED GFR   Result Value Ref Range    GFR (CKD-EPI) 93 >60 mL/min/1.73 m 2   TSH WITH REFLEX TO FT4   Result Value Ref Range    TSH 1.590 0.380 - 5.330 uIU/mL   EKG (NOW)   Result Value Ref Range    Report       [facility] Emergency Dept.    Test  Date:  2024  Pt Name:    JARETH LUO                Department: ER  MRN:        [id number]                      Room:        54  Gender:     Female                       Technician: [id number]  :        [id number]                   Requested By:ER TRIAGE PROTOCOL  Order #:    [id number]                    Reading MD: JHON SQUIRES MD    Measurements  Intervals                                Axis  Rate:       67                           P:          45  ND:         120                          QRS:        37  QRSD:       83                           T:          8  QT:         383  QTc:        405    Interpretive Statements  Sinus rhythm  RSR' in V1 or V2, right VCD or RVH  Compared to ECG 2019 05:31:41  Right ventricular hypertrophy now present  RSR' in V1 or V2 now present  Electronically Signed On 2024 15:04:36 PDT by JHON SQUIRES MD       COURSE & MEDICAL DECISION MAKING     INITIAL ASSESSMENT, COURSE AND PLAN  Differential diagnoses include but not limited to: Metabolic vs Thyroid vs Urine.      Care Narrative: Patient with fatigue, nonspecific symptoms, no dysuria but the patient does appear to have a urinary tract infection, other laboratory test including thyroid are negative.  Will put the patient on Macrobid, have the patient return in the next 2 days if not improved, return sooner with increasing abdominal pains, nausea vomiting, fevers.  The patient has a soft benign abdominal examination, no indication for CT scan of the abdomen.    1:49 PM - Patient was evaluated at bedside for abdominal pain onset three days ago as well as generalized fatigue onset this morning. Discussed the plan of care with the patient including ordering labs and an EKG to further evaluate the patient's condition. The patient was able to ask questions at this time. Ordered for TSH with reflex to FT4, Urine microscopic, CBC w/ diff, CMP, Lipase, HCG qual serum, UA culture if indicated and EKG to evaluate.  Patient verbalizes understanding and support with my plan of care.    3:07 PM - I reevaluated the patient at bedside. I discussed the patient's diagnostic study results at this time. I discussed plan for discharge and follow up as outlined below. The patient is stable for discharge at this time and will return for any new or worsening symptoms. Patient verbalizes understanding and support with my plan for discharge.     DISPOSITION AND DISCUSSIONS    I have discussed management of the patient with the following physicians and LEONELA's:  None    Discussion of management with other QHP or appropriate source(s): None     Escalation of care considered, and ultimately not performed: diagnostic imaging.    Barriers to care at this time, including but not limited to: Patient does not have established PCP.     Decision tools and prescription drugs considered including, but not limited to: Antibiotics   .    The patient will return for new or worsening symptoms and is stable at the time of discharge.    DISPOSITION:  Patient will be discharged home in stable condition.    FOLLOW UP:  [facility], Emergency Dept  [address] [place] [id number] [phone]    If symptoms worsen      OUTPATIENT MEDICATIONS:  Discharge Medication List as of 9/12/2024  3:26 PM        START taking these medications    Details   nitrofurantoin (MACROBID) 100 MG Cap Take 1 Capsule by mouth 2 times a day for 5 days., Disp-10 Capsule, R-0, Normal           FINAL DIAGNOSIS  1. Other fatigue    2. Acute cystitis without hematuria       Rupinder SANABRIA (Soila), am scribing for, and in the presence of, Alonso San M.D..    Electronically signed by: Rupinder Henning (Soila), 9/12/2024    IAlonso M.D. personally performed the services described in this documentation, as scribed by Rupinder Henning in my presence, and it is both accurate and complete.      The note accurately reflects work and decisions  made by me.  Alonso San M.D.  9/12/2024  5:50 PM

## 2024-09-12 NOTE — ED NOTES
Pt ambulatory to [facility] with steady gait. Urine sample collected and sent to lab. Triage note reviewed and agreed with.  Gown provided and asked to change. Call light within reach, chart up for ERP.

## 2024-10-02 DIAGNOSIS — Z00.6 CLINICAL TRIAL PARTICIPANT: ICD-10-CM

## 2024-11-08 ENCOUNTER — RESEARCH ENCOUNTER (OUTPATIENT)
Dept: RESEARCH | Facility: MEDICAL CENTER | Age: 29
End: 2024-11-08

## (undated) DEVICE — BLANKET UNDERBODY FULL ACCES - (5/CA)

## (undated) DEVICE — DRESSING INTERCEED ABSORBABLE ADHESION BARRIER TC7 (10EA/CA)

## (undated) DEVICE — SUTURE 2-0 CHROMIC GUT CT-1 27 (36PK/BX)"

## (undated) DEVICE — WATER IRRIG. STER. 1500 ML - (9/CA)

## (undated) DEVICE — SHEATH RO 4F 10CM (10EA/BX)

## (undated) DEVICE — CATHETER IV NON-SAFETY 18 GA X 1 1/4 (50/BX 4BX/CA)

## (undated) DEVICE — GLOVE BIOGEL SZ 8 SURGICAL PF LTX - (50PR/BX 4BX/CA)

## (undated) DEVICE — TRAY BLADDER CARE W/ 16 FR FOLEY CATHETER STATLOCK  (10/CA)

## (undated) DEVICE — SUTURE 0 VICRYL PLUS CT 36 (36PK/BX)"

## (undated) DEVICE — DETERGENT RENUZYME PLUS 10 OZ PACKET (50/BX)

## (undated) DEVICE — SET EXTENSION WITH 2 PORTS (48EA/CA) ***PART #2C8610 IS A SUBSTITUTE*****

## (undated) DEVICE — SUTURE 3-0 VICRYL PLUS CT-1 - 36 INCH (36/BX)

## (undated) DEVICE — SODIUM CHL IRRIGATION 0.9% 1000ML (12EA/CA)

## (undated) DEVICE — PACK C-SECTION (2EA/CA)

## (undated) DEVICE — TUBING CLEARLINK DUO-VENT - C-FLO (48EA/CA)

## (undated) DEVICE — STAPLER SKIN DISP - (6/BX 10BX/CA) VISISTAT

## (undated) DEVICE — SUTURE 1 CHROMIC CTX ETHICON - (36PK/BX)

## (undated) DEVICE — SLEEVE, SEQUENTIAL CALF REG

## (undated) DEVICE — PENCIL ELECTSURG 10FT HLSTR - WITH BLADE (50EA/CA)

## (undated) DEVICE — SUTURE 0 VICRYL PLUS CT-1 - 36 INCH (36/BX)

## (undated) DEVICE — HEAD HOLDER JUNIOR/ADULT

## (undated) DEVICE — CHLORAPREP 26 ML APPLICATOR - ORANGE TINT(25/CA)

## (undated) DEVICE — TRAY SPINAL ANESTHESIA NON-SAFETY (10/CA)

## (undated) DEVICE — LACTATED RINGERS INJ 1000 ML - (14EA/CA 60CA/PF)

## (undated) DEVICE — KIT  I.V. START (100EA/CA)

## (undated) DEVICE — TAPE CLOTH MEDIPORE 6 INCH - (12RL/CA)

## (undated) DEVICE — PACK ROOM TURNOVER L&D (12/CA)

## (undated) DEVICE — CANISTER SUCTION 3000ML MECHANICAL FILTER AUTO SHUTOFF MEDI-VAC NONSTERILE LF DISP  (40EA/CA)